# Patient Record
Sex: MALE | Race: WHITE | Employment: FULL TIME | URBAN - METROPOLITAN AREA
[De-identification: names, ages, dates, MRNs, and addresses within clinical notes are randomized per-mention and may not be internally consistent; named-entity substitution may affect disease eponyms.]

---

## 2017-09-11 RX ORDER — CEFAZOLIN SODIUM IN 0.9 % NACL 2 G/50 ML
2 INTRAVENOUS SOLUTION, PIGGYBACK (ML) INTRAVENOUS ONCE
Status: CANCELLED | OUTPATIENT
Start: 2017-09-11 | End: 2017-09-11

## 2017-09-11 NOTE — H&P
[address] [facility]  Pre Operative History and Physical Exam    Patient ID:  Soraida Hicks  [id number]  27 y.o.  1965    Today: September 11, 2017          CC:  Left  Knee pain    HPI:   The patient has end stage arthritis of the left knee. The patient was evaluated and examined during consultation prior to today. The patient complains of knee pain with activities, reports pain as mostly occurring along the joint lines, reports stiffness of the knee after inactivity, and swelling/pain at the end of the day and after increased physical activity. The pain affects the patients activities of daily living and quality of life. The patient has attempted and exhausted conservative treatment. There have been no changes to the patient's orthopedic condition since the last office visit. Past Medical History:  Past Medical History:   Diagnosis Date    DDD (degenerative disc disease), cervical 07/20/2016    several surgeries to correct    GERD (gastroesophageal reflux disease)     seldom     History of kidney stones     numerous, lithotripsy x 1    Hypertension     controlled with meds    Nausea & vomiting     sometimes after anesthesia    PUD (peptic ulcer disease)     no recent episodes    Sleep apnea     soon to receive a CPAP machine    Thromboembolus ([facility] [address] 2010    left knee s/p knee arth    Thromboembolus ([facility] [address] 2007    s/p lithotripsy-        Past Surgical History:  Past Surgical History:   Procedure Laterality Date    HX APPENDECTOMY  as teen     HX CERVICAL DISKECTOMY  2015    HX CERVICAL FUSION  1998    x 3 fusion    HX CHOLECYSTECTOMY      HX KNEE ARTHROSCOPY Left     left knee x 2    HX LAP CHOLECYSTECTOMY  2011    HX LUMBAR LAMINECTOMY  1995    lumbar laminectomy    HX TONSILLECTOMY  1993    SINUS SURGERY PROC UNLISTED          Medications:     Prior to Admission medications    Medication Sig Start Date End Date Taking?  Authorizing Provider   lansoprazole (PREVACID) 15 mg capsule Take  by mouth Daily (before breakfast). Historical Provider   diclofenac EC (VOLTAREN) 75 mg EC tablet Take  by mouth. Historical Provider   fluticasone (FLONASE) 50 mcg/actuation nasal spray 2 sprays to each nostril QD 8/29/17   Joe Arcos MD   lisinopril-hydroCHLOROthiazide (PRINZIDE, ZESTORETIC) 20-25 mg per tablet Take 1 Tab by mouth daily. 8/29/17   Joe Arcos MD   potassium chloride SR (KLOR-CON 10) 10 mEq tablet Take 1 Tab by mouth daily. 8/29/17   Joe Arcos MD   nebivolol (BYSTOLIC) 5 mg tablet Take 1 Tab by mouth daily. 8/29/17   Joe Arcos MD   OTHER Diltiazem 2%/Lidocaine 4% cream apply to hemorrhoids q 6 hours as needed 5/4/17   Neyda Dias MD   MOTION SICKNESS RELIEF,MECLIZ, 25 mg chewable tablet TAKE 1 TABLET BY MOUTH EVERY 4 HOURS AS NEEDED FOR VERTIGO OR NAUSEA 12/28/16   Historical Provider   cpap machine kit by Does Not Apply route. 12 cm    Historical Provider   aspirin delayed-release 81 mg tablet Take  by mouth every morning. Historical Provider   traMADol (ULTRAM) 50 mg tablet Take 50 mg by mouth every six (6) hours as needed for Pain. Historical Provider       Family History:     Family History   Problem Relation Age of Onset    Heart Disease Mother      CHF    Hypertension Mother     Cancer Father      lung    Hypertension Father     Hypertension Sister     Diabetes Brother     Stroke Brother     Hypertension Brother        Social History:      Social History   Substance Use Topics    Smoking status: Never Smoker    Smokeless tobacco: Never Used    Alcohol use 0.0 oz/week     1 - 2 Cans of beer per week      Comment: about 4 drinks every 2 weeks         Allergies: Allergies   Allergen Reactions    Codeine Itching        Vitals: There were no vitals taken for this visit.       Objective:         General: No Acute distress                   HEENT: Normocephalic/atramatic                   Lungs:  Breathing non-labored Heart:   RRR                    Abdomen: soft       Extremities:  Pain with ROM of the left knee. Trace effusion. Crepitus present. Distally the patient shows no neurologic deficit. Antalgic gait appreciated. Meds:   Current Outpatient Prescriptions   Medication Sig    lansoprazole (PREVACID) 15 mg capsule Take  by mouth Daily (before breakfast).  diclofenac EC (VOLTAREN) 75 mg EC tablet Take  by mouth.  fluticasone (FLONASE) 50 mcg/actuation nasal spray 2 sprays to each nostril QD    lisinopril-hydroCHLOROthiazide (PRINZIDE, ZESTORETIC) 20-25 mg per tablet Take 1 Tab by mouth daily.  potassium chloride SR (KLOR-CON 10) 10 mEq tablet Take 1 Tab by mouth daily.  nebivolol (BYSTOLIC) 5 mg tablet Take 1 Tab by mouth daily.  OTHER Diltiazem 2%/Lidocaine 4% cream apply to hemorrhoids q 6 hours as needed    MOTION SICKNESS RELIEF,MECLIZ, 25 mg chewable tablet TAKE 1 TABLET BY MOUTH EVERY 4 HOURS AS NEEDED FOR VERTIGO OR NAUSEA    cpap machine kit by Does Not Apply route. 12 cm    aspirin delayed-release 81 mg tablet Take  by mouth every morning.  traMADol (ULTRAM) 50 mg tablet Take 50 mg by mouth every six (6) hours as needed for Pain. No current facility-administered medications for this encounter. Patient Active Problem List   Diagnosis Code    HNP (herniated nucleus pulposus) with myelopathy, cervical M50.00    Kidney stone N20.0    Deep vein thrombosis (DVT) of popliteal vein (HCC) I82.439    DDD (degenerative disc disease), cervical M50.30    Left inguinal hernia K40.90    MARILYN (obstructive sleep apnea) G47.33    Hypersomnia G47.10    Neck pain M54.2    Bilateral inguinal hernia without obstruction or gangrene K40.20    S/P hernia repair Z98.890, Z87.19    Cervical radiculopathy M54.12    Osteoarthritis of cervical spine M47.812       Assessment:   1.  Arthritis of the Left knee    Plan:   The patient has failed previous conservative treatment for this condition including anti-inflammatories, injections and lifestyle modifications. The necessity for joint replacement is present. Risks, benefits, alternatives and possible complications of left knee arthroplasty have been discussed with the patient including but not limited to potential for infection, bleeding, damage to nerves and/or blood vessels, stiffness of the knee after surgery, knee instability after surgery, patellar maltracking, potential for fracture both intra-op and post-op, polyethylene wear, implant loosening, and risk for revision surgery secondary to but not limited to these discussed risks. Further, we have discussed potential for venous thrombo-embolism including deep vein thrombosis and pulmonary embolism despite being on prophylaxis. Additionally, we have discussed potential for continued pain after surgery and patient has voiced understanding that I can make no guarantees regarding the pain relief of outcomes after surgery. We have also previously discussed the potential of morbidity and mortality associated with, but not limited to, the actual surgical procedure, anesthesia, prior medical conditions, and/or the administration of medications perioperatively. The patient has been given the opportunity to ask questions all of which have been answered and the patient wishes to proceed. The patient will be admitted the day of surgery for left total knee replacement.         Signed By: Hipolito Ortiz MD  September 11, 2017

## 2017-09-18 ENCOUNTER — HOSPITAL ENCOUNTER (OUTPATIENT)
Dept: PHYSICAL THERAPY | Age: 52
Discharge: HOME | End: 2017-09-18
Payer: COMMERCIAL

## 2017-09-18 ENCOUNTER — HOSPITAL ENCOUNTER (OUTPATIENT)
Dept: SURGERY | Age: 52
Discharge: HOME | End: 2017-09-18
Payer: COMMERCIAL

## 2017-09-18 VITALS
WEIGHT: 251.4 LBS | HEART RATE: 52 BPM | TEMPERATURE: 97.1 F | SYSTOLIC BLOOD PRESSURE: 142 MMHG | HEIGHT: 75 IN | OXYGEN SATURATION: 96 % | BODY MASS INDEX: 31.26 KG/M2 | DIASTOLIC BLOOD PRESSURE: 80 MMHG

## 2017-09-18 LAB
ALBUMIN SERPL-MCNC: 3.8 G/DL (ref 3.5–5)
ANION GAP SERPL CALC-SCNC: 8 MMOL/L (ref 7–16)
APPEARANCE UR: CLEAR
APTT PPP: 24.9 SEC (ref 23.5–31.7)
ATRIAL RATE: 50 BPM
BACTERIA SPEC CULT: NORMAL
BASOPHILS # BLD: 0 K/UL (ref 0–0.2)
BASOPHILS NFR BLD: 1 % (ref 0–2)
BILIRUB UR QL: NEGATIVE
BUN SERPL-MCNC: 15 MG/DL (ref 6–23)
CALCIUM SERPL-MCNC: 9 MG/DL (ref 8.3–10.4)
CALCULATED P AXIS, ECG09: 3 DEGREES
CALCULATED R AXIS, ECG10: 24 DEGREES
CALCULATED T AXIS, ECG11: 5 DEGREES
CHLORIDE SERPL-SCNC: 104 MMOL/L (ref 98–107)
CO2 SERPL-SCNC: 28 MMOL/L (ref 21–32)
COLOR UR: YELLOW
CREAT SERPL-MCNC: 1.23 MG/DL (ref 0.8–1.5)
DIAGNOSIS, 93000: NORMAL
DIFFERENTIAL METHOD BLD: ABNORMAL
EOSINOPHIL # BLD: 0.3 K/UL (ref 0–0.8)
EOSINOPHIL NFR BLD: 5 % (ref 0.5–7.8)
ERYTHROCYTE [DISTWIDTH] IN BLOOD BY AUTOMATED COUNT: 14.8 % (ref 11.9–14.6)
EST. AVERAGE GLUCOSE BLD GHB EST-MCNC: 128 MG/DL
GLUCOSE SERPL-MCNC: 79 MG/DL (ref 65–100)
GLUCOSE UR STRIP.AUTO-MCNC: NEGATIVE MG/DL
HBA1C MFR BLD: 6.1 % (ref 4.8–6)
HCT VFR BLD AUTO: 47.7 % (ref 41.1–50.3)
HGB BLD-MCNC: 16 G/DL (ref 13.6–17.2)
HGB UR QL STRIP: NEGATIVE
IMM GRANULOCYTES # BLD: 0 K/UL (ref 0–0.5)
IMM GRANULOCYTES NFR BLD: 0.2 % (ref 0–5)
INR PPP: 0.9 (ref 0.9–1.2)
KETONES UR QL STRIP.AUTO: NEGATIVE MG/DL
LEUKOCYTE ESTERASE UR QL STRIP.AUTO: NEGATIVE
LYMPHOCYTES # BLD: 1.5 K/UL (ref 0.5–4.6)
LYMPHOCYTES NFR BLD: 22 % (ref 13–44)
MCH RBC QN AUTO: 30.6 PG (ref 26.1–32.9)
MCHC RBC AUTO-ENTMCNC: 33.5 G/DL (ref 31.4–35)
MCV RBC AUTO: 91.2 FL (ref 79.6–97.8)
MONOCYTES # BLD: 0.7 K/UL (ref 0.1–1.3)
MONOCYTES NFR BLD: 11 % (ref 4–12)
NEUTS SEG # BLD: 4.1 K/UL (ref 1.7–8.2)
NEUTS SEG NFR BLD: 61 % (ref 43–78)
NITRITE UR QL STRIP.AUTO: NEGATIVE
P-R INTERVAL, ECG05: 168 MS
PH UR STRIP: 5.5 [PH] (ref 5–9)
PLATELET # BLD AUTO: 207 K/UL (ref 150–450)
PMV BLD AUTO: 11.6 FL (ref 10.8–14.1)
POTASSIUM SERPL-SCNC: 3.5 MMOL/L (ref 3.5–5.1)
PROT UR STRIP-MCNC: NEGATIVE MG/DL
PROTHROMBIN TIME: 9.8 SEC (ref 9.6–12)
Q-T INTERVAL, ECG07: 432 MS
QRS DURATION, ECG06: 90 MS
QTC CALCULATION (BEZET), ECG08: 393 MS
RBC # BLD AUTO: 5.23 M/UL (ref 4.23–5.67)
SERVICE CMNT-IMP: NORMAL
SODIUM SERPL-SCNC: 140 MMOL/L (ref 136–145)
SP GR UR REFRACTOMETRY: 1.01 (ref 1–1.02)
UROBILINOGEN UR QL STRIP.AUTO: 0.2 EU/DL (ref 0.2–1)
VENTRICULAR RATE, ECG03: 50 BPM
WBC # BLD AUTO: 6.6 K/UL (ref 4.3–11.1)

## 2017-09-18 PROCEDURE — 97161 PT EVAL LOW COMPLEX 20 MIN: CPT

## 2017-09-18 RX ORDER — DICLOFENAC SODIUM 75 MG/1
75 TABLET, DELAYED RELEASE ORAL 2 TIMES DAILY
COMMUNITY
End: 2017-10-11

## 2017-09-18 RX ORDER — NEBIVOLOL 5 MG/1
5 TABLET ORAL DAILY
COMMUNITY
End: 2017-09-18

## 2017-09-18 RX ORDER — TRAMADOL HYDROCHLORIDE 50 MG/1
50 TABLET ORAL
COMMUNITY
End: 2017-10-11

## 2017-09-18 NOTE — PROGRESS NOTES
09/18/17 0900   Oxygen Therapy   O2 Sat (%) 97 %   Pulse via Oximetry 57 beats per minute   O2 Device Room air   Pre-Treatment   Breath Sounds Bilateral Clear;Diminished   Pre FEV1 (liters) 3.1 liters   % Predicted 68     Initial respiratory Assessment completed with pt. Pt was interviewed and evaluated in [facility] prior to surgery. Patient ID:  Kacey Charlton  [id number]  49 y.o.  1965  Surgeon: Marcos Thrasher  Date of Surgery: 10/9/2017  Procedure: Total Left Knee Arthroplasty  Primary Care Physician: Alethea Wall [phone]  Specialists:                                  Pt instructed in the use of Incentive Spirometry. Pt instructed to bring Incentive Spirometer back on date of surgery & to start using Is upon return to pt room. Pt taught proper cough technique      History of smoking:   NONE      Quit date:    Secondhand smoke:PARENTS      Past procedures with Oxygen desaturation: PT STATES HE COULD NOT BREATHE IN RR AFTER HERNIA SURGERY. - FELT LIKE HE COULD NOT BREATHE AFTER LAST SURGERY    Past Medical History:   Diagnosis Date    Adverse effect of anesthesia     after hernia surgery in 2016 here at [facility] \"I had trouble breathing and I blacked out\"    BMI 31.0-31.9,adult     Chronic pain     DDD (degenerative disc disease), cervical 07/20/2016    several surgeries to correct    GERD (gastroesophageal reflux disease)     seldom     History of kidney stones     numerous, lithotripsy x 1    Hypertension     controlled with meds    Nausea & vomiting     sometimes after anesthesia    OA (osteoarthritis)     PUD (peptic ulcer disease)     no recent episodes    Sleep apnea     c-pap    Thromboembolus ([address] 2010    left knee s/p knee arth    Thromboembolus ([address] 2007    s/p lithotripsy-     Vertigo                                     ENT:SINUS, CHOKES EASILY             CURRENTLY HAS MILD CONGESTION Respiratory history:MARILYN- CPAP                                  DENIES SOB                                  Respiratory meds:                                         FAMILY PRESENT:            SPOUSE,                                                PAST SLEEP STUDY:        YES         HX OF MARILYN:                        YES                                            MAIRLYN assessment:                                               SLEEPS ON SIDE &  BACK                                                   PHYSICAL EXAM   Body mass index is 31.42 kg/(m^2). Visit Vitals    /80 (BP 1 Location: Right arm, BP Patient Position: At rest;Sitting)    Pulse (!) 52    Temp 97.1 °F (36.2 °C)    Ht 6' 3\" (1.905 m)    Wt 114 kg (251 lb 6.4 oz)    SpO2 96%    BMI 31.42 kg/m2     Neck circumference:  45.5    cm    Loud snoring:YES      Witnessed apnea or wakening gasping or choking:,  APNEA    Awakens with headaches:DENIES    Morning or daytime tiredness/ sleepiness:  TIRED     Dry mouth or sore throat in morning:YES     Valerio stage:4    SACS score:58    STOP/BAN                              CPAP:HOME CPAP        ALBUTEROL NEB Q6 PRN                CONT SAT HS         Pt.  Phone Number:

## 2017-09-18 NOTE — ADVANCED PRACTICE NURSE
Total Joint Surgery Preoperative Chart Review      Patient ID:  Ham Nickerson  [id number]  27 y.o.  1965  Surgeon: Dr Holly Grover  Date of Surgery: 10/9/2017  Procedure: Total Left Knee Arthroplasty  Primary Care Physician: Spencer Maya [phone]  Specialty Physician(s):      Subjective:   Ham Nickerson is a 46 y.o. WHITE OR  male who presents for preoperative evaluation for Total Left Knee arthroplasty. This is a preoperative chart review note based on data collected by the nurse at the surgical Pre-Assessment visit.     Past Medical History:   Diagnosis Date    Adverse effect of anesthesia     after hernia surgery in 2016 here at [address] \"I had trouble breathing and I blacked out\"    BMI 31.0-31.9,adult     Chronic pain     DDD (degenerative disc disease), cervical 07/20/2016    several surgeries to correct    GERD (gastroesophageal reflux disease)     seldom     History of kidney stones     numerous, lithotripsy x 1    Hypertension     controlled with meds    Nausea & vomiting     sometimes after anesthesia    OA (osteoarthritis)     PUD (peptic ulcer disease)     no recent episodes    Sleep apnea     c-pap    Thromboembolus ([address] 2010    left knee s/p knee arth    Thromboembolus ([address] 2007    s/p lithotripsy-     Vertigo       Past Surgical History:   Procedure Laterality Date    HX APPENDECTOMY  as teen     HX CERVICAL DISKECTOMY  2015    HX CERVICAL FUSION  1998    x 3 fusion    HX HERNIA REPAIR Bilateral 2016    HX HERNIA REPAIR      umbilical     HX KNEE ARTHROSCOPY Left     left knee x 2    HX LAP CHOLECYSTECTOMY  2011    HX LITHOTRIPSY      HX LUMBAR LAMINECTOMY  1995    lumbar laminectomy    HX LYSIS OF ADHESIONS  2012    HX SEPTOPLASTY  2014    HX TONSILLECTOMY  1993     Family History   Problem Relation Age of Onset    Heart Disease Mother      CHF    Hypertension Mother     Cancer Father      lung    Hypertension Father     Hypertension Sister  Diabetes Brother     Stroke Brother     Hypertension Brother       Social History   Substance Use Topics    Smoking status: Never Smoker    Smokeless tobacco: Never Used    Alcohol use 0.0 oz/week     1 - 2 Cans of beer per week      Comment: about 4 drinks every 2 weeks       Prior to Admission medications    Medication Sig Start Date End Date Taking? Authorizing Provider   diclofenac EC (VOLTAREN) 75 mg EC tablet Take 75 mg by mouth two (2) times a day. Indications: OSTEOARTHRITIS   Yes Historical Provider   traMADol (ULTRAM) 50 mg tablet Take 50 mg by mouth every six (6) hours as needed for Pain. Take / use AM day of surgery  per anesthesia protocols if needed   Indications: Pain   Yes Historical Provider   metoprolol succinate (TOPROL-XL) 50 mg XL tablet Take 1 Tab by mouth daily. 9/12/17  Yes Mich Costa MD   fluticasone [facility]) 50 mcg/actuation nasal spray 2 sprays to each nostril QD 8/29/17  Yes Mich Costa MD   lisinopril-hydroCHLOROthiazide (PRINZIDE, ZESTORETIC) 20-25 mg per tablet Take 1 Tab by mouth daily. 8/29/17  Yes Mich Costa MD   potassium chloride SR (KLOR-CON 10) 10 mEq tablet Take 1 Tab by mouth daily. 8/29/17  Yes Mich Costa MD   MOTION SICKNESS RELIEF,MECLIZ, 25 mg chewable tablet TAKE 1 TABLET BY MOUTH EVERY 4 HOURS AS NEEDED FOR VERTIGO OR NAUSEA 12/28/16  Yes Historical Provider   cpap machine kit by Does Not Apply route. 12 cm   Yes Historical Provider   aspirin delayed-release 81 mg tablet Take 81 mg by mouth daily. Take / use AM day of surgery  per anesthesia protocols.    Yes Historical Provider     Allergies   Allergen Reactions    Codeine Itching          Objective:     Physical Exam:   Patient Vitals for the past 24 hrs:   Temp Pulse BP SpO2   09/18/17 1031 97.1 °F (36.2 °C) (!) 52 142/80 96 %       ECG:    EKG Results     Procedure 720 Value Units Date/Time    EKG, 12 LEAD, INITIAL [[id number]] Collected:  09/18/17 1118    Order Status:  Completed Updated:  09/18/17 1200     Ventricular Rate 50 BPM      Atrial Rate 50 BPM      P-R Interval 168 ms      QRS Duration 90 ms      Q-T Interval 432 ms      QTC Calculation (Bezet) 393 ms      Calculated P Axis 3 degrees      Calculated R Axis 24 degrees      Calculated T Axis 5 degrees      Diagnosis --     Sinus bradycardia  Otherwise normal ECG  No previous ECGs available            Data Review:   Labs:   Recent Results (from the past 24 hour(s))   CBC WITH AUTOMATED DIFF    Collection Time: 09/18/17  9:10 AM   Result Value Ref Range    WBC 6.6 4.3 - 11.1 K/uL    RBC 5.23 4.23 - 5.67 M/uL    HGB 16.0 13.6 - 17.2 g/dL    HCT 47.7 41.1 - 50.3 %    MCV 91.2 79.6 - 97.8 FL    MCH 30.6 26.1 - 32.9 PG    MCHC 33.5 31.4 - 35.0 g/dL    RDW 14.8 (H) 11.9 - 14.6 %    PLATELET 236 938 - 147 K/uL    MPV 11.6 10.8 - 14.1 FL    DF AUTOMATED      NEUTROPHILS 61 43 - 78 %    LYMPHOCYTES 22 13 - 44 %    MONOCYTES 11 4.0 - 12.0 %    EOSINOPHILS 5 0.5 - 7.8 %    BASOPHILS 1 0.0 - 2.0 %    IMMATURE GRANULOCYTES 0.2 0.0 - 5.0 %    ABS. NEUTROPHILS 4.1 1.7 - 8.2 K/UL    ABS. LYMPHOCYTES 1.5 0.5 - 4.6 K/UL    ABS. MONOCYTES 0.7 0.1 - 1.3 K/UL    ABS. EOSINOPHILS 0.3 0.0 - 0.8 K/UL    ABS. BASOPHILS 0.0 0.0 - 0.2 K/UL    ABS. IMM.  GRANS. 0.0 0.0 - 0.5 K/UL   PROTHROMBIN TIME + INR    Collection Time: 09/18/17  9:10 AM   Result Value Ref Range    Prothrombin time 9.8 9.6 - 12.0 sec    INR 0.9 0.9 - 1.2     PTT    Collection Time: 09/18/17  9:10 AM   Result Value Ref Range    aPTT 24.9 23.5 - 45.8 SEC   METABOLIC PANEL, BASIC    Collection Time: 09/18/17  9:10 AM   Result Value Ref Range    Sodium 140 136 - 145 mmol/L    Potassium 3.5 3.5 - 5.1 mmol/L    Chloride 104 98 - 107 mmol/L    CO2 28 21 - 32 mmol/L    Anion gap 8 7 - 16 mmol/L    Glucose 79 65 - 100 mg/dL    BUN 15 6 - 23 MG/DL    Creatinine 1.23 0.8 - 1.5 MG/DL    GFR est AA >60 >60 ml/min/1.73m2    GFR est non-AA >60 >60 ml/min/1.73m2    Calcium 9.0 8.3 - 10.4 MG/DL   URINALYSIS W/ RFLX MICROSCOPIC    Collection Time: 09/18/17  9:10 AM   Result Value Ref Range    Color YELLOW      Appearance CLEAR      Specific gravity 1.006 1.001 - 1.023      pH (UA) 5.5 5.0 - 9.0      Protein NEGATIVE  NEG mg/dL    Glucose NEGATIVE  mg/dL    Ketone NEGATIVE  NEG mg/dL    Bilirubin NEGATIVE  NEG      Blood NEGATIVE  NEG      Urobilinogen 0.2 0.2 - 1.0 EU/dL    Nitrites NEGATIVE  NEG      Leukocyte Esterase NEGATIVE  NEG     HEMOGLOBIN A1C WITH EAG    Collection Time: 09/18/17  9:10 AM   Result Value Ref Range    Hemoglobin A1c 6.1 (H) 4.8 - 6.0 %    Est. average glucose 128 mg/dL   ALBUMIN    Collection Time: 09/18/17  9:10 AM   Result Value Ref Range    Albumin 3.8 3.5 - 5.0 g/dL   EKG, 12 LEAD, INITIAL    Collection Time: 09/18/17 11:18 AM   Result Value Ref Range    Ventricular Rate 50 BPM    Atrial Rate 50 BPM    P-R Interval 168 ms    QRS Duration 90 ms    Q-T Interval 432 ms    QTC Calculation (Bezet) 393 ms    Calculated P Axis 3 degrees    Calculated R Axis 24 degrees    Calculated T Axis 5 degrees    Diagnosis       Sinus bradycardia  Otherwise normal ECG  No previous ECGs available           Problem List:  )  Patient Active Problem List   Diagnosis Code    HNP (herniated nucleus pulposus) with myelopathy, cervical M50.00    Kidney stone N20.0    Deep vein thrombosis (DVT) of popliteal vein (HCC) I82.439    DDD (degenerative disc disease), cervical M50.30    Left inguinal hernia K40.90    MARILYN (obstructive sleep apnea) G47.33    Hypersomnia G47.10    Neck pain M54.2    Bilateral inguinal hernia without obstruction or gangrene K40.20    S/P hernia repair Z98.890, Z87.19    Cervical radiculopathy M54.12    Osteoarthritis of cervical spine M47.812       Total Joint Surgery Pre-Assessment Recommendations:           Patient is to wear home CPAP during hospitalization.        Signed By: KALYAN Briceno    September 18, 2017

## 2017-09-18 NOTE — PROGRESS NOTES
Rosendo Rendon  : (41 y.o.) [address] [facility] [address]  [phone]       Physical Therapy Prehab Plan of Treatment and Evaluation Summary:2017    ICD-10: Treatment Diagnosis:   · Pain in Left Knee (M25.562)  · Stiffness of Left Knee, Not elsewhere classified (M25.662)  · Difficulty in walking, Not elsewhere classified (R26.2)  Precautions/Allergies:   Codeine  MEDICAL/REFERRING DIAGNOSIS:  Unilateral primary osteoarthritis, left knee [M17.12]  REFERRING PHYSICIAN: Taz Crespo MD  DATE OF SURGERY: 10/9/17   Assessment:   Comments:  Pt. Plans to go home with spouse. PROBLEM LIST (Impacting functional limitations):  Mr. Valentina Ortiz presents with the following left lower extremity(s) problems:  1. Strength  2. Range of Motion  3. Home Exercise Program  4. Pain   INTERVENTIONS PLANNED:  1. Home Exercise Program  2. Educational Discussion     TREATMENT PLAN: Effective Dates: 2017 TO 2017. Frequency/Duration: Patient to continue to perform home exercise program at least twice per day up until his surgery. GOALS: (Goals have been discussed and agreed upon with patient.)  Discharge Goals: Time Frame: 1 Day  1. Patient will demonstrate independence with a home exercise program designed to increase strength, range of motion and pain control to minimize functional deficits and optimize patient for total joint replacement. Rehabilitation Potential For Stated Goals: Good  Regarding Escobar Child's therapy, I certify that the treatment plan above will be carried out by a therapist or under their direction.   Thank you for this referral,  Yobany Guadalupe, PT               HISTORY:   Present Symptoms:  Pain Intensity 1:  (10 at worst)  Pain Location 1: Knee   History of Present Injury/Illness (Reason for Referral):  Medical/Referring Diagnosis: Unilateral primary osteoarthritis, left knee [M17.12]   Past Medical History/Comorbidities: Mr. Valentina Ortiz  has a past medical history of Adverse effect of anesthesia; BMI 31.0-31.9,adult; Chronic pain; DDD (degenerative disc disease), cervical (07/20/2016); GERD (gastroesophageal reflux disease); History of kidney stones; Hypertension; Nausea & vomiting; OA (osteoarthritis); PUD (peptic ulcer disease); Sleep apnea; Thromboembol[address] ([facility] [address] (2010); Thromboembolus ([address] (2007); and Vertigo. He also has no past medical history of Aneurysm ([address] Arrhythmia; Asthma; Autoimmune disease ([address] CAD (coronary artery disease); Cancer ([facility] [address] Chronic kidney disease; Chronic obstructive pulmonary disease ([address] Coagulation disorder ([address] Diabetes ([address] Difficult intubation; Endocarditis; Heart failure ([facility] [address] Ill-defined condition; Liver disease; Malignant hyperthermia due to anesthesia; Nicotine vapor product user; Non-nicotine vapor product user; Other ill-defined conditions; Pseudocholinesterase deficiency; Psychiatric disorder; Rheumatic fever; Seizures ([facility] [address] Stroke [facility]); Thyroid disease; or Unspecified adverse effect of anesthesia. Mr. Valentina Ortiz  has a past surgical history that includes appendectomy (as teen ); lap cholecystectomy (2011); knee arthroscopy (Left); cervical fusion (1998); lumbar laminectomy (1995); cervical diskectomy (2015); tonsillectomy (1993); hernia repair (Bilateral, 2016); septoplasty (2014); hernia repair; lithotripsy; and lysis of adhesions (2012).   Social History/Living Environment:   Home Environment: Private residence  # Steps to Enter: 1  Rails to Enter: No  One/Two Story Residence: Two story  # of Interior Steps: 11  Interior Rails: Left  Lift Chair Available: No  Living Alone: No  Support Systems: Spouse/Significant Other/Partner  Patient Expects to be Discharged to[de-identified] Private residence  Current DME Used/Available at Home: None  Tub or Shower Type: Tub/Shower combination  Work/Activity:  Employed, sitting and light activity  Dominant Side:  RIGHT  Current Medications:  See Pre-assessment nursing note   Number of Personal Factors/Comorbidities that affect the Plan of Care: 1-2: MODERATE COMPLEXITY   EXAMINATION:   ADLs (Current Functional Status):   Ambulation:  [x] Independent  [] Walk Indoors Only  [] Walk Outdoors  [] Use Assistive Device  [] Use Wheelchair Only Dressing:  [x] Independent  Requires Assistance from Someone for:  [x] Sock/Shoes  [] Pants  [] Everything   Bathing/Showering:   [x] Independent  [] Requires Assistance from Someone  [] Sponge Bath Only Household Activities:  [x] Routine house and yard work  [] Light Housework Only  [] None   Observation/Orthostatic Postural Assessment:   Exceptions to WDLGenu valgus left  ROM/Flexibility:   Gross Assessment: Yes  AROM: Within functional limits (right LE)                LLE Assessment  LLE Assessment (WDL): Exception to WDL  LLE AROM  L Knee Flexion: 110  L Knee Extension: 7          Strength:   Gross Assessment: Yes  Strength: Within functional limits (right LE)       LLE Strength  L Knee Flexion: 4  L Knee Extension: 4          Functional Mobility:    Gross Assessment: Yes    Gait Description (WDL): Exceptions to WDL  Stand to Sit: Independent  Sit to Stand: Independent  Distance (ft): 250 Feet (ft)  Ambulation - Level of Assistance: Independent  Stance: Left decreased  Gait Abnormalities: Antalgic          Balance:    Sitting: Intact  Standing: Intact   Body Structures Involved:  1. Bones  2. Joints  3. Muscles  4. Ligaments Body Functions Affected:  1. Movement Related Activities and Participation Affected:  1. Mobility   Number of elements that affect the Plan of Care: 3: MODERATE COMPLEXITY   CLINICAL PRESENTATION:   Presentation: Stable and uncomplicated: LOW COMPLEXITY   CLINICAL DECISION MAKING:   Outcome Measure:    Tool Used: Lower Extremity Functional Scale (LEFS)  Score:  Initial: 11/80 Most Recent: X/80 (Date: -- )   Interpretation of Score: 20 questions each scored on a 5 point scale with 0 representing \"extreme difficulty or unable to perform\" and 4 representing \"no difficulty\". The lower the score, the greater the functional disability. 80/80 represents no disability. Minimal detectable change is 9 points. Score 80 79-65 64-49 48-33 32-17 16-1 0   Modifier CH CI CJ CK CL CM CN     ? Mobility - Walking and Moving Around:     - CURRENT STATUS: CM - 80%-99% impaired, limited or restricted    - GOAL STATUS: CM - 80%-99% impaired, limited or restricted    - D/C STATUS:  CM - 80%-99% impaired, limited or restricted    Medical Necessity:   · Mr. Guy Stokes is expected to optimize his lower extremity strength and ROM in preparation for joint replacement surgery. Reason for Services/Other Comments:  · Achieve baseline assesment of musculoskeletal system, functional mobility and home environment. , educate in PT HEP in preparation for surgery, educate in hospital plan of care. Use of outcome tool(s) and clinical judgement create a POC that gives a: Clear prediction of patient's progress: LOW COMPLEXITY   TREATMENT:   Treatment/Session Assessment:  Patient was instructed in PT- HEP to increase strength and ROM in LEs. Answered all questions. · Post session pain:  No complaints  · Compliance with Program/Exercises: compliant most of the time.   Total Treatment Duration:PT Patient Time In/Time Out  Time In: 0900  Time Out: [address] [facility],

## 2017-09-18 NOTE — PERIOP NOTES
Joe Arcos MD    Your patient recently had labs drawn during a hospital appointment due to an upcoming surgery. The results are attached. If you have any questions or concerns please reach out to your patient for a follow-up in your office. Please do not respond to this message as my mailbox is not monitored. You may call [phone] with questions or concerns. Recent Results (from the past 12 hour(s))   CBC WITH AUTOMATED DIFF    Collection Time: 09/18/17  9:10 AM   Result Value Ref Range    WBC 6.6 4.3 - 11.1 K/uL    RBC 5.23 4.23 - 5.67 M/uL    HGB 16.0 13.6 - 17.2 g/dL    HCT 47.7 41.1 - 50.3 %    MCV 91.2 79.6 - 97.8 FL    MCH 30.6 26.1 - 32.9 PG    MCHC 33.5 31.4 - 35.0 g/dL    RDW 14.8 (H) 11.9 - 14.6 %    PLATELET 626 515 - 725 K/uL    MPV 11.6 10.8 - 14.1 FL    DF AUTOMATED      NEUTROPHILS 61 43 - 78 %    LYMPHOCYTES 22 13 - 44 %    MONOCYTES 11 4.0 - 12.0 %    EOSINOPHILS 5 0.5 - 7.8 %    BASOPHILS 1 0.0 - 2.0 %    IMMATURE GRANULOCYTES 0.2 0.0 - 5.0 %    ABS. NEUTROPHILS 4.1 1.7 - 8.2 K/UL    ABS. LYMPHOCYTES 1.5 0.5 - 4.6 K/UL    ABS. MONOCYTES 0.7 0.1 - 1.3 K/UL    ABS. EOSINOPHILS 0.3 0.0 - 0.8 K/UL    ABS. BASOPHILS 0.0 0.0 - 0.2 K/UL    ABS. IMM.  GRANS. 0.0 0.0 - 0.5 K/UL   PROTHROMBIN TIME + INR    Collection Time: 09/18/17  9:10 AM   Result Value Ref Range    Prothrombin time 9.8 9.6 - 12.0 sec    INR 0.9 0.9 - 1.2     PTT    Collection Time: 09/18/17  9:10 AM   Result Value Ref Range    aPTT 24.9 23.5 - 25.6 SEC   METABOLIC PANEL, BASIC    Collection Time: 09/18/17  9:10 AM   Result Value Ref Range    Sodium 140 136 - 145 mmol/L    Potassium 3.5 3.5 - 5.1 mmol/L    Chloride 104 98 - 107 mmol/L    CO2 28 21 - 32 mmol/L    Anion gap 8 7 - 16 mmol/L    Glucose 79 65 - 100 mg/dL    BUN 15 6 - 23 MG/DL    Creatinine 1.23 0.8 - 1.5 MG/DL    GFR est AA >60 >60 ml/min/1.73m2    GFR est non-AA >60 >60 ml/min/1.73m2    Calcium 9.0 8.3 - 10.4 MG/DL   URINALYSIS W/ RFLX MICROSCOPIC    Collection Time: 09/18/17  9:10 AM   Result Value Ref Range    Color YELLOW      Appearance CLEAR      Specific gravity 1.006 1.001 - 1.023      pH (UA) 5.5 5.0 - 9.0      Protein NEGATIVE  NEG mg/dL    Glucose NEGATIVE  mg/dL    Ketone NEGATIVE  NEG mg/dL    Bilirubin NEGATIVE  NEG      Blood NEGATIVE  NEG      Urobilinogen 0.2 0.2 - 1.0 EU/dL    Nitrites NEGATIVE  NEG      Leukocyte Esterase NEGATIVE  NEG     HEMOGLOBIN A1C WITH EAG    Collection Time: 09/18/17  9:10 AM   Result Value Ref Range    Hemoglobin A1c 6.1 (H) 4.8 - 6.0 %    Est. average glucose 128 mg/dL   ALBUMIN    Collection Time: 09/18/17  9:10 AM   Result Value Ref Range    Albumin 3.8 3.5 - 5.0 g/dL

## 2017-09-18 NOTE — PERIOP NOTES
Patient verified name, , and surgery as listed in [facility]. Type 3 surgery, walk in assessment complete. Labs per surgeon: CBC, BMP, U/A, PT/PTT, Albumin, HGB-A1C ; results within [facility] protocols. MRSA nasal swab and Urine nicotine pending. Labs per anesthesia protocol: included in surgeons orders. EKG: done today; within [facility] protocols. [facility] sleep [facility] office note dated 17 on chart for reference if needed. Hibiclens and instructions to return bottle on DOS given per hospital policy. Nasal Swab collected per MD order and instructions for Mupirocin nasal ointment if required. Patient provided with handouts including Guide to Surgery, Pain Management, Hand Hygiene, Blood Transfusion Education, and [facility] Brochure. Patient answered medical/surgical history questions at their best of ability. All prior to admission medications documented in [facility]. Original medication prescription bottles not visualized during patient appointment. Patient instructed to hold all vitamins 7 days prior to surgery and NSAIDS 5 days prior to surgery. Medications to be held: diclofenac. Patient instructed to continue previous medications as prescribed prior to surgery and to take the following medications the day of surgery according to anesthesia guidelines with a small sip of water: 81 mg aspirin, metoprolol, meclizine if needed, tramadol if needed. Instructed to bring c-pap dos. Patient teach back successful and patient demonstrates knowledge of instruction.

## 2017-09-19 LAB
COTININE UR QL SCN: NEGATIVE NG/ML
DRUG SCREEN COMMENT:, 753798: NORMAL

## 2017-09-20 NOTE — PERIOP NOTES
9/19/2017  8:40 AM - Ray, [facility] [employer]   Component Results   Component Value Flag Ref Range Units Status   Cotinine Screen, urine NEGATIVE   Cbehih=654 ng/mL Final

## 2017-10-02 NOTE — PERIOP NOTES
Call received from Kat Keller with Dr. Jonhson Drummond office regarding pt's recent diagnosis of pneumonia~Dr Edgar by to review most recent CXR dated 10/2/17 in [facility] and Dr. Leena Sheppard office note from 9/27/17. Most recent CXR states no pneumonia; Dr. Jaime Able cleared pt for surgery. Message left with Kat Keller regarding surgery clearance.

## 2017-10-08 ENCOUNTER — ANESTHESIA EVENT (OUTPATIENT)
Dept: SURGERY | Age: 52
DRG: 470 | End: 2017-10-08
Payer: COMMERCIAL

## 2017-10-09 ENCOUNTER — ANESTHESIA (OUTPATIENT)
Dept: SURGERY | Age: 52
DRG: 470 | End: 2017-10-09
Payer: COMMERCIAL

## 2017-10-09 ENCOUNTER — HOME HEALTH ADMISSION (OUTPATIENT)
Dept: HOME HEALTH SERVICES | Facility: HOME HEALTH | Age: 52
End: 2017-10-09
Payer: COMMERCIAL

## 2017-10-09 ENCOUNTER — HOSPITAL ENCOUNTER (INPATIENT)
Age: 52
LOS: 2 days | Discharge: HOME HEALTH | DRG: 470 | End: 2017-10-11
Attending: ORTHOPAEDIC SURGERY | Admitting: ORTHOPAEDIC SURGERY
Payer: COMMERCIAL

## 2017-10-09 PROBLEM — M17.9 OA (OSTEOARTHRITIS) OF KNEE: Status: ACTIVE | Noted: 2017-10-09

## 2017-10-09 LAB
ABO + RH BLD: NORMAL
BLOOD GROUP ANTIBODIES SERPL: NORMAL
GLUCOSE BLD STRIP.AUTO-MCNC: 101 MG/DL (ref 65–100)
HGB BLD-MCNC: 14 G/DL (ref 13.6–17.2)
SPECIMEN EXP DATE BLD: NORMAL

## 2017-10-09 PROCEDURE — 74011000250 HC RX REV CODE- 250

## 2017-10-09 PROCEDURE — 74011250637 HC RX REV CODE- 250/637: Performed by: ORTHOPAEDIC SURGERY

## 2017-10-09 PROCEDURE — 86900 BLOOD TYPING SEROLOGIC ABO: CPT | Performed by: ORTHOPAEDIC SURGERY

## 2017-10-09 PROCEDURE — 77030012935 HC DRSG AQUACEL BMS -B: Performed by: ORTHOPAEDIC SURGERY

## 2017-10-09 PROCEDURE — 36415 COLL VENOUS BLD VENIPUNCTURE: CPT | Performed by: ORTHOPAEDIC SURGERY

## 2017-10-09 PROCEDURE — 74011250637 HC RX REV CODE- 250/637: Performed by: ANESTHESIOLOGY

## 2017-10-09 PROCEDURE — 74011000302 HC RX REV CODE- 302: Performed by: ORTHOPAEDIC SURGERY

## 2017-10-09 PROCEDURE — 74011250636 HC RX REV CODE- 250/636: Performed by: ANESTHESIOLOGY

## 2017-10-09 PROCEDURE — 77030033067 HC SUT PDO STRATFX SPIR J&J -B: Performed by: ORTHOPAEDIC SURGERY

## 2017-10-09 PROCEDURE — 65270000029 HC RM PRIVATE

## 2017-10-09 PROCEDURE — 77030003665 HC NDL SPN BBMI -A: Performed by: ANESTHESIOLOGY

## 2017-10-09 PROCEDURE — 82962 GLUCOSE BLOOD TEST: CPT

## 2017-10-09 PROCEDURE — 77030034849: Performed by: ORTHOPAEDIC SURGERY

## 2017-10-09 PROCEDURE — 97165 OT EVAL LOW COMPLEX 30 MIN: CPT

## 2017-10-09 PROCEDURE — 77030018846 HC SOL IRR STRL H20 ICUM -A

## 2017-10-09 PROCEDURE — 94760 N-INVAS EAR/PLS OXIMETRY 1: CPT

## 2017-10-09 PROCEDURE — 77030012890

## 2017-10-09 PROCEDURE — 77030011640 HC PAD GRND REM COVD -A: Performed by: ORTHOPAEDIC SURGERY

## 2017-10-09 PROCEDURE — 74011250636 HC RX REV CODE- 250/636: Performed by: ORTHOPAEDIC SURGERY

## 2017-10-09 PROCEDURE — 85018 HEMOGLOBIN: CPT | Performed by: ORTHOPAEDIC SURGERY

## 2017-10-09 PROCEDURE — 77030018836 HC SOL IRR NACL ICUM -A: Performed by: ORTHOPAEDIC SURGERY

## 2017-10-09 PROCEDURE — 76060000034 HC ANESTHESIA 1.5 TO 2 HR: Performed by: ORTHOPAEDIC SURGERY

## 2017-10-09 PROCEDURE — 77030003602 HC NDL NRV BLK BBMI -B: Performed by: ANESTHESIOLOGY

## 2017-10-09 PROCEDURE — 76010000162 HC OR TIME 1.5 TO 2 HR INTENSV-TIER 1: Performed by: ORTHOPAEDIC SURGERY

## 2017-10-09 PROCEDURE — 76210000006 HC OR PH I REC 0.5 TO 1 HR: Performed by: ORTHOPAEDIC SURGERY

## 2017-10-09 PROCEDURE — 77030031139 HC SUT VCRL2 J&J -A: Performed by: ORTHOPAEDIC SURGERY

## 2017-10-09 PROCEDURE — 74011250636 HC RX REV CODE- 250/636

## 2017-10-09 PROCEDURE — 77030003666 HC NDL SPINAL BD -A: Performed by: ORTHOPAEDIC SURGERY

## 2017-10-09 PROCEDURE — 76942 ECHO GUIDE FOR BIOPSY: CPT | Performed by: ORTHOPAEDIC SURGERY

## 2017-10-09 PROCEDURE — 77030007880 HC KT SPN EPDRL BBMI -B: Performed by: ANESTHESIOLOGY

## 2017-10-09 PROCEDURE — 76010010054 HC POST OP PAIN BLOCK: Performed by: ORTHOPAEDIC SURGERY

## 2017-10-09 PROCEDURE — 77030032490 HC SLV COMPR SCD KNE COVD -B

## 2017-10-09 PROCEDURE — 74011000250 HC RX REV CODE- 250: Performed by: ANESTHESIOLOGY

## 2017-10-09 PROCEDURE — 0SRD0JZ REPLACEMENT OF LEFT KNEE JOINT WITH SYNTHETIC SUBSTITUTE, OPEN APPROACH: ICD-10-PCS | Performed by: ORTHOPAEDIC SURGERY

## 2017-10-09 PROCEDURE — 86580 TB INTRADERMAL TEST: CPT | Performed by: ORTHOPAEDIC SURGERY

## 2017-10-09 PROCEDURE — 77010033678 HC OXYGEN DAILY

## 2017-10-09 PROCEDURE — 77030020782 HC GWN BAIR PAWS FLX 3M -B: Performed by: ANESTHESIOLOGY

## 2017-10-09 PROCEDURE — 77030002933 HC SUT MCRYL J&J -A: Performed by: ORTHOPAEDIC SURGERY

## 2017-10-09 PROCEDURE — 77030002922 HC SUT FBRWRE ARTH -B: Performed by: ORTHOPAEDIC SURGERY

## 2017-10-09 PROCEDURE — C1776 JOINT DEVICE (IMPLANTABLE): HCPCS | Performed by: ORTHOPAEDIC SURGERY

## 2017-10-09 PROCEDURE — 74011000250 HC RX REV CODE- 250: Performed by: ORTHOPAEDIC SURGERY

## 2017-10-09 PROCEDURE — 77030013727 HC IRR FAN PULSVC ZIMM -B: Performed by: ORTHOPAEDIC SURGERY

## 2017-10-09 PROCEDURE — 97161 PT EVAL LOW COMPLEX 20 MIN: CPT

## 2017-10-09 DEVICE — COMPNT FEM CR TRIATHLN 7 L PA -- MOR-KNEE: Type: IMPLANTABLE DEVICE | Site: KNEE | Status: FUNCTIONAL

## 2017-10-09 DEVICE — COMPONENT PAT SZ A40 W44MM DIA40MM THK11MM MED LAT KNEE: Type: IMPLANTABLE DEVICE | Site: KNEE | Status: FUNCTIONAL

## 2017-10-09 DEVICE — BASEPLATE TIB SZ 8 AP60MM ML85MM KEEL W58MM D28MM PEG L12MM: Type: IMPLANTABLE DEVICE | Site: KNEE | Status: FUNCTIONAL

## 2017-10-09 RX ORDER — SODIUM CHLORIDE 0.9 % (FLUSH) 0.9 %
5-10 SYRINGE (ML) INJECTION EVERY 8 HOURS
Status: DISCONTINUED | OUTPATIENT
Start: 2017-10-09 | End: 2017-10-09

## 2017-10-09 RX ORDER — FENTANYL CITRATE 50 UG/ML
100 INJECTION, SOLUTION INTRAMUSCULAR; INTRAVENOUS ONCE
Status: COMPLETED | OUTPATIENT
Start: 2017-10-09 | End: 2017-10-09

## 2017-10-09 RX ORDER — NALOXONE HYDROCHLORIDE 0.4 MG/ML
.2-.4 INJECTION, SOLUTION INTRAMUSCULAR; INTRAVENOUS; SUBCUTANEOUS
Status: DISCONTINUED | OUTPATIENT
Start: 2017-10-09 | End: 2017-10-11 | Stop reason: HOSPADM

## 2017-10-09 RX ORDER — SODIUM CHLORIDE 0.9 % (FLUSH) 0.9 %
5-10 SYRINGE (ML) INJECTION AS NEEDED
Status: DISCONTINUED | OUTPATIENT
Start: 2017-10-09 | End: 2017-10-11 | Stop reason: HOSPADM

## 2017-10-09 RX ORDER — CEFAZOLIN SODIUM IN 0.9 % NACL 2 G/50 ML
2 INTRAVENOUS SOLUTION, PIGGYBACK (ML) INTRAVENOUS EVERY 8 HOURS
Status: COMPLETED | OUTPATIENT
Start: 2017-10-09 | End: 2017-10-10

## 2017-10-09 RX ORDER — SODIUM CHLORIDE 0.9 % (FLUSH) 0.9 %
5-10 SYRINGE (ML) INJECTION AS NEEDED
Status: DISCONTINUED | OUTPATIENT
Start: 2017-10-09 | End: 2017-10-09 | Stop reason: HOSPADM

## 2017-10-09 RX ORDER — HYDROMORPHONE HYDROCHLORIDE 1 MG/ML
1 INJECTION, SOLUTION INTRAMUSCULAR; INTRAVENOUS; SUBCUTANEOUS
Status: DISCONTINUED | OUTPATIENT
Start: 2017-10-09 | End: 2017-10-11 | Stop reason: HOSPADM

## 2017-10-09 RX ORDER — SODIUM CHLORIDE 9 MG/ML
INJECTION INTRAMUSCULAR; INTRAVENOUS; SUBCUTANEOUS AS NEEDED
Status: DISCONTINUED | OUTPATIENT
Start: 2017-10-09 | End: 2017-10-09 | Stop reason: HOSPADM

## 2017-10-09 RX ORDER — ONDANSETRON 8 MG/1
8 TABLET, ORALLY DISINTEGRATING ORAL
Qty: 60 TAB | Refills: 0 | Status: SHIPPED | OUTPATIENT
Start: 2017-10-09 | End: 2018-02-16 | Stop reason: ALTCHOICE

## 2017-10-09 RX ORDER — MORPHINE SULFATE 10 MG/ML
INJECTION, SOLUTION INTRAMUSCULAR; INTRAVENOUS AS NEEDED
Status: DISCONTINUED | OUTPATIENT
Start: 2017-10-09 | End: 2017-10-09 | Stop reason: HOSPADM

## 2017-10-09 RX ORDER — SODIUM CHLORIDE, SODIUM LACTATE, POTASSIUM CHLORIDE, CALCIUM CHLORIDE 600; 310; 30; 20 MG/100ML; MG/100ML; MG/100ML; MG/100ML
100 INJECTION, SOLUTION INTRAVENOUS CONTINUOUS
Status: DISCONTINUED | OUTPATIENT
Start: 2017-10-09 | End: 2017-10-09 | Stop reason: HOSPADM

## 2017-10-09 RX ORDER — NEOMYCIN AND POLYMYXIN B SULFATES 40; 200000 MG/ML; [USP'U]/ML
SOLUTION IRRIGATION AS NEEDED
Status: DISCONTINUED | OUTPATIENT
Start: 2017-10-09 | End: 2017-10-09 | Stop reason: HOSPADM

## 2017-10-09 RX ORDER — CYCLOBENZAPRINE HCL 10 MG
5 TABLET ORAL 3 TIMES DAILY
Status: DISCONTINUED | OUTPATIENT
Start: 2017-10-09 | End: 2017-10-11 | Stop reason: HOSPADM

## 2017-10-09 RX ORDER — ROPIVACAINE HYDROCHLORIDE 2 MG/ML
INJECTION, SOLUTION EPIDURAL; INFILTRATION; PERINEURAL AS NEEDED
Status: DISCONTINUED | OUTPATIENT
Start: 2017-10-09 | End: 2017-10-09 | Stop reason: HOSPADM

## 2017-10-09 RX ORDER — HYDROMORPHONE HYDROCHLORIDE 2 MG/ML
0.5 INJECTION, SOLUTION INTRAMUSCULAR; INTRAVENOUS; SUBCUTANEOUS
Status: DISCONTINUED | OUTPATIENT
Start: 2017-10-09 | End: 2017-10-09 | Stop reason: HOSPADM

## 2017-10-09 RX ORDER — PROPOFOL 10 MG/ML
INJECTION, EMULSION INTRAVENOUS
Status: DISCONTINUED | OUTPATIENT
Start: 2017-10-09 | End: 2017-10-09 | Stop reason: HOSPADM

## 2017-10-09 RX ORDER — MIDAZOLAM HYDROCHLORIDE 1 MG/ML
2 INJECTION, SOLUTION INTRAMUSCULAR; INTRAVENOUS ONCE
Status: COMPLETED | OUTPATIENT
Start: 2017-10-09 | End: 2017-10-09

## 2017-10-09 RX ORDER — CELECOXIB 200 MG/1
200 CAPSULE ORAL ONCE
Status: COMPLETED | OUTPATIENT
Start: 2017-10-09 | End: 2017-10-09

## 2017-10-09 RX ORDER — LIDOCAINE HYDROCHLORIDE 10 MG/ML
0.1 INJECTION INFILTRATION; PERINEURAL AS NEEDED
Status: DISCONTINUED | OUTPATIENT
Start: 2017-10-09 | End: 2017-10-09 | Stop reason: HOSPADM

## 2017-10-09 RX ORDER — ROPIVACAINE HYDROCHLORIDE 5 MG/ML
INJECTION, SOLUTION EPIDURAL; INFILTRATION; PERINEURAL AS NEEDED
Status: DISCONTINUED | OUTPATIENT
Start: 2017-10-09 | End: 2017-10-09 | Stop reason: HOSPADM

## 2017-10-09 RX ORDER — HYDROMORPHONE HYDROCHLORIDE 2 MG/1
2 TABLET ORAL
Status: DISCONTINUED | OUTPATIENT
Start: 2017-10-09 | End: 2017-10-11 | Stop reason: HOSPADM

## 2017-10-09 RX ORDER — SODIUM CHLORIDE 0.9 % (FLUSH) 0.9 %
5-10 SYRINGE (ML) INJECTION EVERY 8 HOURS
Status: DISCONTINUED | OUTPATIENT
Start: 2017-10-09 | End: 2017-10-09 | Stop reason: HOSPADM

## 2017-10-09 RX ORDER — AMOXICILLIN 250 MG
2 CAPSULE ORAL DAILY
Status: DISCONTINUED | OUTPATIENT
Start: 2017-10-10 | End: 2017-10-11 | Stop reason: HOSPADM

## 2017-10-09 RX ORDER — SODIUM CHLORIDE, SODIUM LACTATE, POTASSIUM CHLORIDE, CALCIUM CHLORIDE 600; 310; 30; 20 MG/100ML; MG/100ML; MG/100ML; MG/100ML
75 INJECTION, SOLUTION INTRAVENOUS CONTINUOUS
Status: DISCONTINUED | OUTPATIENT
Start: 2017-10-09 | End: 2017-10-09 | Stop reason: HOSPADM

## 2017-10-09 RX ORDER — ONDANSETRON 8 MG/1
8 TABLET, ORALLY DISINTEGRATING ORAL
Status: DISCONTINUED | OUTPATIENT
Start: 2017-10-09 | End: 2017-10-11 | Stop reason: HOSPADM

## 2017-10-09 RX ORDER — DIPHENHYDRAMINE HCL 25 MG
25 CAPSULE ORAL
Status: DISCONTINUED | OUTPATIENT
Start: 2017-10-09 | End: 2017-10-11 | Stop reason: HOSPADM

## 2017-10-09 RX ORDER — SODIUM CHLORIDE 9 MG/ML
100 INJECTION, SOLUTION INTRAVENOUS CONTINUOUS
Status: DISPENSED | OUTPATIENT
Start: 2017-10-09 | End: 2017-10-10

## 2017-10-09 RX ORDER — ZOLPIDEM TARTRATE 10 MG/1
10 TABLET ORAL
Qty: 60 TAB | Refills: 0 | Status: SHIPPED | OUTPATIENT
Start: 2017-10-09 | End: 2018-02-16 | Stop reason: ALTCHOICE

## 2017-10-09 RX ORDER — ACETAMINOPHEN 500 MG
1000 TABLET ORAL ONCE
Status: COMPLETED | OUTPATIENT
Start: 2017-10-09 | End: 2017-10-09

## 2017-10-09 RX ORDER — NALOXONE HYDROCHLORIDE 0.4 MG/ML
0.2 INJECTION, SOLUTION INTRAMUSCULAR; INTRAVENOUS; SUBCUTANEOUS AS NEEDED
Status: DISCONTINUED | OUTPATIENT
Start: 2017-10-09 | End: 2017-10-09 | Stop reason: HOSPADM

## 2017-10-09 RX ORDER — CEFAZOLIN SODIUM IN 0.9 % NACL 2 G/50 ML
2 INTRAVENOUS SOLUTION, PIGGYBACK (ML) INTRAVENOUS ONCE
Status: COMPLETED | OUTPATIENT
Start: 2017-10-09 | End: 2017-10-09

## 2017-10-09 RX ORDER — TRANEXAMIC ACID 650 1/1
1300 TABLET ORAL DAILY
Status: COMPLETED | OUTPATIENT
Start: 2017-10-10 | End: 2017-10-11

## 2017-10-09 RX ORDER — CYCLOBENZAPRINE HCL 10 MG
5 TABLET ORAL 3 TIMES DAILY
Qty: 60 TAB | Refills: 0 | Status: SHIPPED | OUTPATIENT
Start: 2017-10-09 | End: 2018-02-16 | Stop reason: ALTCHOICE

## 2017-10-09 RX ORDER — ZOLPIDEM TARTRATE 5 MG/1
5 TABLET ORAL
Status: DISCONTINUED | OUTPATIENT
Start: 2017-10-09 | End: 2017-10-11 | Stop reason: HOSPADM

## 2017-10-09 RX ORDER — HYDROMORPHONE HYDROCHLORIDE 2 MG/1
2 TABLET ORAL
Qty: 90 TAB | Refills: 0 | Status: SHIPPED | OUTPATIENT
Start: 2017-10-09 | End: 2018-02-16 | Stop reason: ALTCHOICE

## 2017-10-09 RX ORDER — DEXAMETHASONE SODIUM PHOSPHATE 100 MG/10ML
10 INJECTION INTRAMUSCULAR; INTRAVENOUS ONCE
Status: COMPLETED | OUTPATIENT
Start: 2017-10-10 | End: 2017-10-10

## 2017-10-09 RX ORDER — ACETAMINOPHEN 10 MG/ML
1000 INJECTION, SOLUTION INTRAVENOUS ONCE
Status: COMPLETED | OUTPATIENT
Start: 2017-10-09 | End: 2017-10-09

## 2017-10-09 RX ORDER — ASPIRIN 325 MG
325 TABLET, DELAYED RELEASE (ENTERIC COATED) ORAL EVERY 12 HOURS
Status: DISCONTINUED | OUTPATIENT
Start: 2017-10-09 | End: 2017-10-11 | Stop reason: HOSPADM

## 2017-10-09 RX ORDER — BUPIVACAINE HYDROCHLORIDE 7.5 MG/ML
INJECTION, SOLUTION INTRASPINAL AS NEEDED
Status: DISCONTINUED | OUTPATIENT
Start: 2017-10-09 | End: 2017-10-09 | Stop reason: HOSPADM

## 2017-10-09 RX ORDER — FLUTICASONE PROPIONATE 50 MCG
2 SPRAY, SUSPENSION (ML) NASAL DAILY
Status: DISCONTINUED | OUTPATIENT
Start: 2017-10-10 | End: 2017-10-11 | Stop reason: HOSPADM

## 2017-10-09 RX ORDER — ACETAMINOPHEN 500 MG
1000 TABLET ORAL EVERY 6 HOURS
Qty: 120 TAB | Refills: 0 | Status: SHIPPED | OUTPATIENT
Start: 2017-10-10 | End: 2018-02-16 | Stop reason: ALTCHOICE

## 2017-10-09 RX ORDER — ACETAMINOPHEN 500 MG
1000 TABLET ORAL EVERY 6 HOURS
Status: DISCONTINUED | OUTPATIENT
Start: 2017-10-10 | End: 2017-10-11 | Stop reason: HOSPADM

## 2017-10-09 RX ORDER — GABAPENTIN 100 MG/1
100 CAPSULE ORAL 2 TIMES DAILY
Status: DISCONTINUED | OUTPATIENT
Start: 2017-10-09 | End: 2017-10-11 | Stop reason: HOSPADM

## 2017-10-09 RX ORDER — ASPIRIN 325 MG
325 TABLET, DELAYED RELEASE (ENTERIC COATED) ORAL EVERY 12 HOURS
Qty: 60 TAB | Refills: 0 | Status: ON HOLD | OUTPATIENT
Start: 2017-10-09 | End: 2018-01-08

## 2017-10-09 RX ORDER — METOPROLOL SUCCINATE 50 MG/1
50 TABLET, EXTENDED RELEASE ORAL DAILY
Status: DISCONTINUED | OUTPATIENT
Start: 2017-10-10 | End: 2017-10-11 | Stop reason: HOSPADM

## 2017-10-09 RX ORDER — GABAPENTIN 100 MG/1
100 CAPSULE ORAL 2 TIMES DAILY
Qty: 60 CAP | Refills: 0 | Status: SHIPPED | OUTPATIENT
Start: 2017-10-09 | End: 2018-02-16 | Stop reason: ALTCHOICE

## 2017-10-09 RX ORDER — POTASSIUM CHLORIDE 750 MG/1
10 TABLET, EXTENDED RELEASE ORAL DAILY
Status: DISCONTINUED | OUTPATIENT
Start: 2017-10-10 | End: 2017-10-11 | Stop reason: HOSPADM

## 2017-10-09 RX ORDER — CELECOXIB 200 MG/1
200 CAPSULE ORAL EVERY 12 HOURS
Status: DISCONTINUED | OUTPATIENT
Start: 2017-10-09 | End: 2017-10-11 | Stop reason: HOSPADM

## 2017-10-09 RX ORDER — OXYCODONE HCL 10 MG/1
10 TABLET, FILM COATED, EXTENDED RELEASE ORAL EVERY 12 HOURS
Status: DISCONTINUED | OUTPATIENT
Start: 2017-10-09 | End: 2017-10-11 | Stop reason: HOSPADM

## 2017-10-09 RX ORDER — LISINOPRIL AND HYDROCHLOROTHIAZIDE 20; 25 MG/1; MG/1
1 TABLET ORAL DAILY
Status: DISCONTINUED | OUTPATIENT
Start: 2017-10-10 | End: 2017-10-11 | Stop reason: HOSPADM

## 2017-10-09 RX ORDER — AMOXICILLIN 250 MG
2 CAPSULE ORAL DAILY
Qty: 120 TAB | Refills: 0 | Status: SHIPPED | OUTPATIENT
Start: 2017-10-10 | End: 2018-02-16 | Stop reason: ALTCHOICE

## 2017-10-09 RX ORDER — OXYCODONE HYDROCHLORIDE 5 MG/1
5 TABLET ORAL
Status: DISCONTINUED | OUTPATIENT
Start: 2017-10-09 | End: 2017-10-09 | Stop reason: HOSPADM

## 2017-10-09 RX ORDER — CELECOXIB 200 MG/1
200 CAPSULE ORAL 2 TIMES DAILY
Qty: 60 CAP | Refills: 2 | Status: SHIPPED | OUTPATIENT
Start: 2017-10-09 | End: 2018-01-08

## 2017-10-09 RX ADMIN — BUPIVACAINE HYDROCHLORIDE 2 ML: 7.5 INJECTION, SOLUTION INTRASPINAL at 08:59

## 2017-10-09 RX ADMIN — TUBERCULIN PURIFIED PROTEIN DERIVATIVE 5 UNITS: 5 INJECTION, SOLUTION INTRADERMAL at 07:05

## 2017-10-09 RX ADMIN — SODIUM CHLORIDE 100 ML/HR: 900 INJECTION, SOLUTION INTRAVENOUS at 12:13

## 2017-10-09 RX ADMIN — HYDROMORPHONE HYDROCHLORIDE 1 MG: 1 INJECTION, SOLUTION INTRAMUSCULAR; INTRAVENOUS; SUBCUTANEOUS at 14:26

## 2017-10-09 RX ADMIN — CELECOXIB 200 MG: 200 CAPSULE ORAL at 20:42

## 2017-10-09 RX ADMIN — SODIUM CHLORIDE, SODIUM LACTATE, POTASSIUM CHLORIDE, AND CALCIUM CHLORIDE: 600; 310; 30; 20 INJECTION, SOLUTION INTRAVENOUS at 08:00

## 2017-10-09 RX ADMIN — SODIUM CHLORIDE 100 ML/HR: 900 INJECTION, SOLUTION INTRAVENOUS at 22:15

## 2017-10-09 RX ADMIN — SODIUM CHLORIDE, SODIUM LACTATE, POTASSIUM CHLORIDE, AND CALCIUM CHLORIDE: 600; 310; 30; 20 INJECTION, SOLUTION INTRAVENOUS at 10:25

## 2017-10-09 RX ADMIN — ROPIVACAINE HYDROCHLORIDE 20 ML: 5 INJECTION, SOLUTION EPIDURAL; INFILTRATION; PERINEURAL at 08:42

## 2017-10-09 RX ADMIN — MIDAZOLAM HYDROCHLORIDE 2 MG: 1 INJECTION, SOLUTION INTRAMUSCULAR; INTRAVENOUS at 08:39

## 2017-10-09 RX ADMIN — SODIUM CHLORIDE, SODIUM LACTATE, POTASSIUM CHLORIDE, AND CALCIUM CHLORIDE 100 ML/HR: 600; 310; 30; 20 INJECTION, SOLUTION INTRAVENOUS at 07:03

## 2017-10-09 RX ADMIN — SODIUM CHLORIDE, SODIUM LACTATE, POTASSIUM CHLORIDE, AND CALCIUM CHLORIDE: 600; 310; 30; 20 INJECTION, SOLUTION INTRAVENOUS at 09:15

## 2017-10-09 RX ADMIN — HYDROMORPHONE HYDROCHLORIDE 2 MG: 2 TABLET ORAL at 13:26

## 2017-10-09 RX ADMIN — OXYCODONE HYDROCHLORIDE 10 MG: 10 TABLET, FILM COATED, EXTENDED RELEASE ORAL at 15:41

## 2017-10-09 RX ADMIN — ACETAMINOPHEN 1000 MG: 10 INJECTION, SOLUTION INTRAVENOUS at 15:42

## 2017-10-09 RX ADMIN — ZOLPIDEM TARTRATE 5 MG: 5 TABLET ORAL at 20:42

## 2017-10-09 RX ADMIN — CYCLOBENZAPRINE HYDROCHLORIDE 5 MG: 10 TABLET, FILM COATED ORAL at 14:27

## 2017-10-09 RX ADMIN — LIDOCAINE HYDROCHLORIDE 0.1 ML: 10 INJECTION, SOLUTION INFILTRATION; PERINEURAL at 07:03

## 2017-10-09 RX ADMIN — GABAPENTIN 100 MG: 100 CAPSULE ORAL at 15:42

## 2017-10-09 RX ADMIN — REGULAR STRENGTH 325 MG: 325 TABLET ORAL at 20:42

## 2017-10-09 RX ADMIN — FENTANYL CITRATE 50 MCG: 50 INJECTION, SOLUTION INTRAMUSCULAR; INTRAVENOUS at 08:39

## 2017-10-09 RX ADMIN — CEFAZOLIN 2 G: 1 INJECTION, POWDER, FOR SOLUTION INTRAMUSCULAR; INTRAVENOUS; PARENTERAL at 08:47

## 2017-10-09 RX ADMIN — HYDROMORPHONE HYDROCHLORIDE 1 MG: 1 INJECTION, SOLUTION INTRAMUSCULAR; INTRAVENOUS; SUBCUTANEOUS at 20:42

## 2017-10-09 RX ADMIN — ACETAMINOPHEN 1000 MG: 500 TABLET, FILM COATED ORAL at 07:03

## 2017-10-09 RX ADMIN — PROPOFOL 120 MCG/KG/MIN: 10 INJECTION, EMULSION INTRAVENOUS at 09:05

## 2017-10-09 RX ADMIN — CEFAZOLIN 2 G: 1 INJECTION, POWDER, FOR SOLUTION INTRAMUSCULAR; INTRAVENOUS; PARENTERAL at 14:27

## 2017-10-09 RX ADMIN — CYCLOBENZAPRINE HYDROCHLORIDE 5 MG: 10 TABLET, FILM COATED ORAL at 20:42

## 2017-10-09 RX ADMIN — CELECOXIB 200 MG: 200 CAPSULE ORAL at 07:03

## 2017-10-09 RX ADMIN — HYDROMORPHONE HYDROCHLORIDE 1 MG: 1 INJECTION, SOLUTION INTRAMUSCULAR; INTRAVENOUS; SUBCUTANEOUS at 17:10

## 2017-10-09 NOTE — OP NOTES
[facility]  Total Knee Arthroplasty  Patient:Abrahan Child   : 1965  Medical Record [id number]    Pre-operative Diagnosis:  Primary osteoarthritis of left knee [M17.12]    Post-operative Diagnosis: Primary osteoarthritis of left knee [M17.12]    Location: [place] [address]    Date of Procedure: 10/9/2017    Surgeon: Pilo Carrero MD    Assistant: NONE    Anesthesia: Spinal and adductor nerve block    Procedure:  Left Total Knee Arthroplasty    Tourniquet Time: 0 minutes    EBL: 042DH     Complications: None    Patient Condition upon Completion of Procedure: Stable    Implants:   Implant Name Type Inv. Item Serial No.  Lot No. LRB No. Used   PAT ASYM MTL-BK 11MM SZ A40 -- TRIATHLON - [id number]  PAT ASYM MTL-BK 11MM SZ A40 -- TRIATHLON D5PP [employer] HOW D5PP Left 1   COMPNT FEM CR TRIATHLN 7 L PA --  - [id number]  COMPNT FEM CR TRIATHLN 7 L PA --  [id number] [employer] HOW [id number] Left 1   BASEPLT TIB PC TRITNM SZ 8 -- TRIATHLON - [id number]  BASEPLT TIB PC TRITNM SZ 8 -- TRIATHLON [id number] [employer] HOW [id number] Left 1   8, CS, 9MM TIBIAL BEARING INSERT - CS      [id number] [employer] [id number] Left 1       Intra-Operative Findings: Pre-operatively we assess the motion of the patients knee and noted that the knee lacked approximately 0 degrees of extension. Intra-operatively we noted that the articular surfaces were arthritic with cartilage loss of both the medial and lateral compartments. The patella was examined and found to be in poor condition. The patella was  resurfaced. With trial components in place we assessed knee motion and found that the knee was able to fully extend. Flexion was felt to be 135 degrees. Description of Procedure:    Nilsa Collins had undergone adductor canal block in the preoperative holding area.  Nilsa Collins was brought to the operating room, positioned on the operating room table, and after appropriate identification underwent spinal anesthesia. A zarco catheter was then placed. IV antibiotics were administered per proticol. A pneumatic tourniquet was placed about the limb. The left leg and was then prepped and draped in the usual sterile manner. Timeout was taken identifying the patient, procedure ,operative side and surgeon. Prior to incision one gram of IV transexamic acid was administered intravenously. An anterior longitudinal incision was made just medial to the tibial tubercle and extending proximal.  A subvastas capsular incision was performed. The medial capsular flap was elevated around to the midcoronal plane. The patella was subluxed laterally and patellar fat pat partially excised. The knee was flexed and externally rotated. The articular surface revealed cartilage loss with exposed bone and bone spurs throughout all three compartments. The anterior cruciate ligament was resected. We first addressed our distal femoral resection. Using intramedullary instrumentation we resected 8mm of distal femur using a 5 degree valgus cut angle. Medial and lateral condylar cuts were verified to be level using the capture of the distal femoral cutting jig. We next addressed our proximal tibia. Using extramedullary intrstrumentation we resected 2mm of bone as measured from the more involved compartment. Our cut was verified to be perpendicular to the meachanical axis of the tibia as referenced from the tibial tubercle, the long axis of the tibia, the center of the ankle, and the patients 2nd toe. Our slope was cut with the goal of 0-3 degrees posterior slope. At this point a spacer block was used to verify that the knee was able to obtain full extension and was balanced in extension.  We did not have to resect additional bone to achieve this goal. Once the knee was felt to fully extend and showed good balance in extension the distal femoral pins were removed and we moved on to finishing our distal femoral preparation. Prior to sizing our distal femur we marked Austin's Line and our transepicondylar axis. Using sizing instrumentation the femur was sized to a #7 component. The anterior and posterior cuts along with the anterior and posterior chamfer cuts were then made using the 4-in-1 cutting block. Osteophytes were removed from the tibial and femoral surfaces. The PCL was assessed and felt to be in good condition and be very stable. Medial and lateral meniscus' were removed along with any redundant tissue. Any posterior osteophytes were removed. We then returned our attention to the tibial side. The tibia was then sized to a #8 component. The tibial component was assessed in terms of position and rotation. Once we felt that we had obtained symmetric coverage of the proximal tibia and had rotated the tibial tray to a point where the midline of the component was bisecting the middle and medial 3rd of the tibial tubercle we marked the proximal tibia with bovie cautery to kavon rotation and also pinned the tibial tray into place. We then performed a trial of the knee with trial components in place. We felt that with a 9 mm polyethylene trial in place the knee had acceptable varus and valgus stability throughout arc of motion, was able to fully extend, was not too tight in flexion, and had acceptable stability with anterior  Drawer testing. No additional surgical releases were required. Again we assessed our PCL and felt it was stable and in good condition. The patella was then everted and examined. The patella was felt to be in poor condition and the decision was made to  resurface the patella. Bone was resected to accomodate a size 40mm patella button. A trial reduction revealed appropriate tracking through the patellofemoral groove with no lateral retinacular release required.  Again patellar tracking was assessed and it was felt that the patella was tracking appropriately within the femoral trochlear groove. At this point the trial polyethylene component and trial femoral component were removed. We again assessed our tibial tray and verified that we were satisfied with its position. We did not have to adjust its position. The proximal tibia was punched and drilled per protocol for the system. We irrigated and debrided all bony surfaces of residual tissue and bone. We then inserted the tibial and femoral components and noted them to be well seated. We then inserted our final polyethylene component which was a 9mm thick CS insert. Pauline Kleins knee was placed through a range of motion and noted to be stable as mentioned above with the trial components. In additional we checked patellar tracking one last time which was felt to be appropriate. A lavage of diluted betadine solution of  17.5 ml Betadine in 500 of 0.9% normal saline was allowed to soak in the wound for 3 minutes after implanting of the prosthesis. The wound was irrigated with normal saline again before closure. We then carefully injected periarticular cocktail about and capsule and subcutaneous tissue. In addition, one additional gram of transexemic acid was given at the end of the case for a total dose of 2 grams. No drain was placed. The capsular layer was closed using a combination of 0-Stratafix bidirectional barbed suture and #2 Fiberwire. . The subcutaneous layer was closed using a 2-0 Vicryl interrupted suture. The skin was closed using staples. A sterile dressing was applied. A cryo pad was applied on the operative leg. The sponge count and needle counts were correct. Patient was transferred to the hospital stretcher and transported to recovery in stable condition.     Signed By: Gregory Medina MD

## 2017-10-09 NOTE — PERIOP NOTES
TRANSFER - OUT REPORT:    Verbal report given to [facility] on [employer]  being transferred to [facility] for routine progression of care       Report consisted of patients Situation, Background, Assessment and   Recommendations(SBAR). Information from the following report(s) SBAR, MAR and Recent Results was reviewed with the receiving nurse. Lines:   Peripheral IV 10/09/17 Left Hand (Active)   Site Assessment Clean, dry, & intact 10/9/2017  6:52 AM   Phlebitis Assessment 0 10/9/2017  6:52 AM   Dressing Status Clean, dry, & intact 10/9/2017  6:52 AM   Dressing Type Transparent;Tape 10/9/2017  6:52 AM   Hub Color/Line Status Green; Infusing 10/9/2017  6:52 AM   Action Taken Blood drawn 10/9/2017  6:52 AM        Opportunity for questions and clarification was provided.       Patient transported with:   [employer]

## 2017-10-09 NOTE — ANESTHESIA PROCEDURE NOTES
Spinal Block    Start time: 10/9/2017 8:56 AM  End time: 10/9/2017 8:59 AM  Performed by: Sagar Gonzalez  Authorized by: Sagar Gonzalez     Pre-procedure:   Indications: at surgeon's request and primary anesthetic  Preanesthetic Checklist: patient identified, risks and benefits discussed, anesthesia consent, site marked, patient being monitored and timeout performed    Timeout Time: 08:56          Spinal Block:   Patient Position:  Seated  Prep Region:  Lumbar  Prep: chlorhexidine and patient draped      Location:  L3-4  Technique:  Single shot  Local:  Lidocaine 1%  Local Dose (mL):  3    Needle:   Needle Type:  Pencan  Needle Gauge:  25 G  Attempts:  1      Events: CSF confirmed, no blood with aspiration and no paresthesia        Assessment:  Insertion:  Uncomplicated  Patient tolerance:  Patient tolerated the procedure well with no immediate complications

## 2017-10-09 NOTE — ANESTHESIA POSTPROCEDURE EVALUATION
Post-Anesthesia Evaluation and Assessment    Patient: Rusty Bass MRN: [id number]  SSN: [id number]    YOB: 1965  Age: 45 y.o. Sex: male       Cardiovascular Function/Vital Signs  Visit Vitals    /85 (BP 1 Location: Right arm, BP Patient Position: At rest)    Pulse (!) 56    Temp 36.3 °C (97.3 °F)    Resp 18    SpO2 100%       Patient is status post spinal anesthesia for Procedure(s):  LEFT KNEE ARTHROPLASTY TOTAL/ [employer]LY. Nausea/Vomiting: None    Postoperative hydration reviewed and adequate. Pain:  Pain Scale 1: Visual (10/09/17 1124)  Pain Intensity 1: 0 (10/09/17 1124)   Managed    Neurological Status:   Neuro (WDL): Exceptions to WDL (10/09/17 1124)  Neuro  Neurologic State: Stuporous (10/09/17 1124)  LUE Motor Response: Purposeful (10/09/17 1124)  LLE Motor Response: Numbness; Pharmacologically paralyzed (10/09/17 1124)  RUE Motor Response: Purposeful (10/09/17 1124)  RLE Motor Response: Numbness; Pharmacologically paralyzed (10/09/17 1124)   At baseline    Mental Status and Level of Consciousness: Arousable    Pulmonary Status:   O2 Device: Nasal cannula (10/09/17 1124)   Adequate oxygenation and airway patent    Complications related to anesthesia: None    Post-anesthesia assessment completed.  No concerns    Signed By: Rafi Nice MD     October 9, 2017

## 2017-10-09 NOTE — PROGRESS NOTES
Problem: Mobility Impaired (Adult and Pediatric)  Goal: *Acute Goals and Plan of Care (Insert Text)  GOALS (1-4 days):  (1.)Mr. Mynor Hill will move from supine to sit and sit to supine in bed with SUPERVISION. (2.)Mr. Mynor Hill will transfer from bed to chair and chair to bed with STAND BY ASSIST using the least restrictive device. (3.)Mr. Mynor Hill will ambulate with STAND BY ASSIST for 200 feet with the least restrictive device. (4.)Mr. Mynor Hill will ambulate up/down 1 step with no railing with CONTACT GUARD ASSIST with walker. (5.)Mr. Mynor Hill will increase left knee ROM to 5°-80°.  ________________________________________________________________________________________________      PHYSICAL THERAPY JOINT CAMP TKA: INITIAL ASSESSMENT, TREATMENT DAY: DAY OF ASSESSMENT, PM 10/9/2017  INPATIENT: Hospital Day: 1  Payor: Antonieta Dominique / Plan: [facility] / Product Type: PPO /      NAME/AGE/GENDER: Rachana Bentley is a 45 y.o. male      PRIMARY DIAGNOSIS:  Primary osteoarthritis of left knee [M17.12]              Procedure(s) and Anesthesia Type:     * LEFT KNEE ARTHROPLASTY TOTAL/ WESLY - Spinal (Left)  ICD-10: Treatment Diagnosis:        · Pain in Left Knee (M25.562)  · Stiffness of Left Knee, Not elsewhere classified (M25.662)  · Difficulty in walking, Not elsewhere classified (R26.2)       ASSESSMENT:      Mr. Mynor Hill presents with impaired strength & mobility s/p left TKA. Pt also had decreased stability during out of bed activity. Pt will benefit from follow up therapy to help restore safe function prior to returning home with caregiver. This section established at most recent assessment   PROBLEM LIST (Impairments causing functional limitations):  1. Decreased Strength  2. Decreased ADL/Functional Activities  3. Decreased Transfer Abilities  4. Decreased Ambulation Ability/Technique  5. Decreased Balance  6. Increased Pain  7. Decreased Activity Tolerance  8.  Decreased Flexibility/Joint Mobility  9. Decreased [place] with Home Exercise Program    INTERVENTIONS PLANNED: (Benefits and precautions of physical therapy have been discussed with the patient.)  1. Bed Mobility  2. Gait Training  3. Home Exercise Program (HEP)  4. Therapeutic Exercise/Strengthening  5. Transfer Training  6. Range of Motion: active/assisted/passive  7. Therapeutic Activities  8. Group Therapy      TREATMENT PLAN: Frequency/Duration: Follow patient BID   to address above goals. Rehabilitation Potential For Stated Goals: GOOD      RECOMMENDED REHABILITATION/EQUIPMENT: (at time of discharge pending progress): Continue Skilled Therapy and Home Health: Physical Therapy. HISTORY:   History of Present Injury/Illness (Reason for Referral): The patient has end stage arthritis of the left knee. The patient was evaluated and examined during consultation prior to today. The patient complains of knee pain with activities, reports pain as mostly occurring along the joint lines, reports stiffness of the knee after inactivity, and swelling/pain at the end of the day and after increased physical activity. The pain affects the patients activities of daily living and quality of life. The patient has attempted and exhausted conservative treatment. There have been no changes to the patient's orthopedic condition since the last office visit. Past Medical History/Comorbidities:   Mr. Claudio Rush  has a past medical history of Adverse effect of anesthesia; BMI 31.0-31.9,adult; Chronic pain; DDD (degenerative disc disease), cervical (07/20/2016); GERD (gastroesophageal reflux disease); History of kidney stones; Hypertension; Nausea & vomiting; OA (osteoarthritis); PUD (peptic ulcer disease); Sleep apnea; Thromboembolus ([address] (2010); Thromboembolus ([address] (2007); and Vertigo. He also has no past medical history of Aneurysm ([address] Arrhythmia; Asthma; Autoimmune disease ([address] CAD (coronary artery disease); Cancer ([address]  Chronic kidney disease; Chronic obstructive pulmonary disease ([facility] Coagulation disorder ([facility] Diabetes ([facility] Difficult intubation; Endocarditis; Heart failure ([facility] Ill-defined condition; Liver disease; Malignant hyperthermia due to anesthesia; Nicotine vapor product user; Non-nicotine vapor product user; Other ill-defined conditions(799.89); Pseudocholinesterase deficiency; Psychiatric disorder; Rheumatic fever; Seizures ([facility] Stroke [facility]); Thyroid disease; or Unspecified adverse effect of anesthesia. Mr. Param Sellers  has a past surgical history that includes appendectomy (as teen ); lap cholecystectomy (2011); knee arthroscopy (Left); cervical fusion (1998); lumbar laminectomy (1995); cervical diskectomy (2015); tonsillectomy (1993); hernia repair (Bilateral, 2016); septoplasty (2014); hernia repair; lithotripsy; and lysis of adhesions (2012). Social History/Living Environment:   Home Environment: Private residence  # Steps to Enter: 1  Rails to Enter: No  One/Two Story Residence: Two story  # of Interior Steps: [address]: Left  Living Alone: Yes  Support Systems: Spouse/Significant Other/Partner  Patient Expects to be Discharged to[de-identified] Private residence  Current DME Used/Available at Home: None  Tub or Shower Type: Tub/Shower combination  Prior Level of Function/Work/Activity:  Pt was independent without an assistive device prior to this [employer]   Number of Personal Factors/Comorbidities that affect the Plan of Care: 3+: HIGH COMPLEXITY   EXAMINATION:   Most Recent Physical Functioning:      Gross Assessment  AROM: Within functional limits (right LE)  Strength:  Within functional limits (right LE)  Coordination: Within functional limits (right LE)            LLE PROM  L Knee Flexion: 65 (~post op)  L Knee Extension: -10 (~post op)           Bed Mobility  Supine to Sit: Contact guard assistance  Sit to Supine: Contact guard assistance  Scooting: Contact guard assistance     Transfers  Sit to Stand: Minimum assistance  Stand to Sit: Minimum assistance  Bed to Chair:  (NT)     Balance  Sitting: Intact; Without support  Standing: Impaired; With support (walker)                Weight Bearing Status  Left Side Weight Bearing: As tolerated  Distance (ft): 5 Feet (ft)  Ambulation - Level of Assistance: Minimal assistance  Assistive Device: Walker, rolling  Speed/Ana Laura: Shuffled; Slow  Step Length: Left shortened;Right shortened  Stance: Left decreased  Gait Abnormalities: Antalgic;Decreased step clearance         Braces/Orthotics: none     Left Knee Cold  Type: Cryocuff       Body Structures Involved:  1. Joints  2. Muscles Body Functions Affected:  1. Sensory/Pain  2. Movement Related Activities and Participation Affected:  1. General Tasks and Demands  2. Mobility   Number of elements that affect the Plan of Care: 4+: HIGH COMPLEXITY   CLINICAL PRESENTATION:   Presentation: Stable and uncomplicated: LOW COMPLEXITY   CLINICAL DECISION MAKIN[address] [place] [id number] AM-PAC 6 Clicks   Basic Mobility Inpatient Short Form  How much difficulty does the patient currently have. .. Unable A Lot A Little None   1. Turning over in bed (including adjusting bedclothes, sheets and blankets)? [ ] 1   [ ] 2   [X] 3   [ ] 4   2. Sitting down on and standing up from a chair with arms ( e.g., wheelchair, bedside commode, etc.)   [ ] 1   [ ] 2   [X] 3   [ ] 4   3. Moving from lying on back to sitting on the side of the bed? [ ] 1   [ ] 2   [X] 3   [ ] 4   How much help from another person does the patient currently need. .. Total A Lot A Little None   4. Moving to and from a bed to a chair (including a wheelchair)? [ ] 1   [ ] 2   [X] 3   [ ] 4   5. Need to walk in hospital room? [ ] 1   [ ] 2   [X] 3   [ ] 4   6. Climbing 3-5 steps with a railing? [X] 1   [ ] 2   [ ] 3   [ ] 4   © , Trustees of [address] [place], [place] [address], under license to [employer].  All rights reserved       Score:  Initial: 16 Most Recent: X (Date: -- ) Interpretation of Tool:  Represents activities that are increasingly more difficult (i.e. Bed mobility, Transfers, Gait). Score 24 23 22-20 19-15 14-10 9-7 6       Modifier CH CI CJ CK CL CM CN         · Mobility - Walking and Moving Around:               - CURRENT STATUS:    CK - 40%-59% impaired, limited or restricted               - GOAL STATUS:           CJ - 20%-39% impaired, limited or restricted               - D/C STATUS:                       ---------------To be determined---------------  Payor: BLUE CROSS / Plan: [place]OSS [facility] / Product Type: PPO /       Medical Necessity:     · Patient is expected to demonstrate progress in strength, range of motion, balance, coordination and functional technique to decrease assistance required with bed mobility, transfers & gait. Reason for Services/Other Comments:  · Patient continues to require skilled intervention due to pt not independent with functional mobility.    Use of outcome tool(s) and clinical judgement create a POC that gives a: Clear prediction of patient's progress: LOW COMPLEXITY                 TREATMENT:   (In addition to Assessment/Re-Assessment sessions the following treatments were rendered)      Pre-treatment Symptoms/Complaints:  none  Pain: Initial: visual scale  Pain Intensity 1: 3  Pain Location 1: Knee  Pain Orientation 1: Left  Pain Intervention(s) 1: Cold pack, Repositioned  Post Session:  3/10      Assessment/Reassessment only, no treatment provided today           Date:    Date:    Date:      ACTIVITY/EXERCISE AM PM AM PM AM PM   GROUP THERAPY  [ ]  [ ]  [ ]  [ ]  [ ]  [ ]   Ankle Pumps               Quad Sets               Gluteal Sets               Hip ABd/ADduction               Straight Leg Raises               Knee Slides               Short Arc Quads               Long Arc Quads               Chair Slides                               B = bilateral; AA = active assistive; A = active; P = passive       Treatment/Session Assessment:         Response to Treatment: tolerated well     Education:  [ ] Home Exercises  [X] Fall Precautions  [ ] Hip Precautions [ ] D/C Instruction Review  [ ] Knee/Hip Prosthesis Review  [X] Walker Management/Safety [ ] Adaptive Equipment as Needed         Interdisciplinary Collaboration:   · Occupational Therapist  · Registered Nurse     After treatment position/precautions:   · Supine in bed  · Bed/Chair-wheels locked  · Bed in low position  · Caregiver at bedside  · Call light within reach  · RN notified  · Family at bedside     Compliance with Program/Exercises: Will assess as treatment progresses. Recommendations/Intent for next treatment session:  Treatment next visit will focus on increasing Mr. Jimena Crouch independence with bed mobility, transfers, gait training, strength/ROM exercises, modalities for pain, and patient education.        Total Treatment Duration:  PT Patient Time In/Time Out  Time In: 1400  Time Out: [address] [place], PT

## 2017-10-09 NOTE — PROGRESS NOTES
TRANSFER - IN REPORT:    Verbal report received from [facility] (name) on Ligia Emerson  being received from PACU(unit) for routine progression of care      Report consisted of patients Situation, Background, Assessment and   Recommendations(SBAR). Information from the following report(s) SBAR, Kardex, OR Summary, Procedure Summary, Intake/Output, MAR, Accordion, Recent Results and Med Rec Status was reviewed with the receiving nurse. Opportunity for questions and clarification was provided. Assessment completed upon patients arrival to unit and care assumed.

## 2017-10-09 NOTE — PERIOP NOTES
TRANSFER - OUT REPORT:    Verbal report given to Danay Astorga RN(name) on Betsy Johnson  being transferred to 331(orthopedic unit) for routine post - op       Report consisted of patients Situation, Background, Assessment and   Recommendations(SBAR). Information from the following report(s) SBAR, OR Summary, Procedure Summary, Intake/Output and MAR was reviewed with the receiving nurse. Opportunity for questions and clarification was provided.       Patient transported with:   O2 @ 2 liters

## 2017-10-09 NOTE — PROGRESS NOTES
Oxygen rounds:     10/09/17 1403   Oxygen Therapy   O2 Sat (%) 98 %   Pulse via Oximetry 53 beats per minute   O2 Device Room air

## 2017-10-09 NOTE — PERIOP NOTES
TRANSFER - IN REPORT:    Verbal report received from [facility]  on Albina Garza  being received from [facility] [place]  for routine progression of care      Report consisted of patients Situation, Background, Assessment and   Recommendations(SBAR). Information from the following report(s) SBAR, Kardex, Intake/Output and MAR was reviewed with the receiving nurse. Opportunity for questions and clarification was provided. Assessment completed upon patients arrival to unit and care assumed.

## 2017-10-09 NOTE — PERIOP NOTES
Teach back method used in review of Incentive Spirometer, Hibiclens usage preop, TB screening and pain management goals.

## 2017-10-09 NOTE — PROGRESS NOTES
10/09/17 1310   Oxygen Therapy   O2 Sat (%) 100 %   Pulse via Oximetry 55 beats per minute   O2 Device Nasal cannula   O2 Flow Rate (L/min) 2 l/min  (weaned to room air)   Patient achieved   3500     Ml/sec on IS. Patient encouraged to do every hour while awake-patient agreed and demonstrated. No shortness of breath or distress noted. BS are clear b/l.    [facility] [place] notes reviewed- patient brought home cpap unit

## 2017-10-09 NOTE — IP AVS SNAPSHOT
Blain Kehr 
 
 
 [address] 
[phone] Patient: Shona Gutierres MRN: [id number] :1965 You are allergic to the following Allergen Reactions Codeine Itching Immunizations Administered for This Admission Name Date  
 TB Skin Test (PPD) Intradermal  Deferred (), 10/9/2017 Recent Documentation Smoking Status Never Smoker Emergency Contacts Name Discharge Info Relation Home Work Mobile Jeannette Child DISCHARGE CAREGIVER [3] Spouse [3] [phone] Melody Bernstein  Sister [23] [phone] About your hospitalization You were admitted on:  2017 You last received care in the:  [facility] You were discharged on:  2017 Unit phone number:  [phone] Why you were hospitalized Your primary diagnosis was:  Not on File Your diagnoses also included:  Oa (Osteoarthritis) Of Knee Providers Seen During Your Hospitalizations Provider Role Specialty Primary office phone Roscoe Mckenna MD Attending Provider Orthopedic Surgery [phone] Your Primary Care Physician (PCP) Primary Care Physician Office Phone Office Fax Delvis Saavedra [phone] Follow-up Information Follow up With Details Comments Contact Info Lea Delgado MD  As needed [address] [facility] 
[phone] Roscoe Mckenna MD  As scheduled by office [address] [facility] [address] [place] [address] [place] [address] Rd 
[phone] [address]  Will contact you within 48 hrs [facility] [address] [facility] [address] 
[phone] Current Discharge Medication List  
  
START taking these medications Dose & Instructions Dispensing Information Comments Morning Noon Evening Bedtime  
 acetaminophen 500 mg tablet Commonly known as:  TYLENOL Your next dose is: Today Dose:  1000 mg Take 2 Tabs by mouth every six (6) hours. Quantity:  120 Tab Refills:  0  
     
   
   
  
   
  
 celecoxib 200 mg capsule Commonly known as:  CELEBREX Your next dose is: Today Dose:  200 mg Take 1 Cap by mouth two (2) times a day for 90 days. Quantity:  60 Cap Refills:  2  
     
   
   
  
   
  
 cyclobenzaprine 10 mg tablet Commonly known as:  FLEXERIL Your next dose is: Today Dose:  5 mg Take 0.5 Tabs by mouth three (3) times daily. Quantity:  60 Tab Refills:  0  
     
   
   
  
   
  
 gabapentin 100 mg capsule Commonly known as:  NEURONTIN Your next dose is: Today Dose:  100 mg Take 1 Cap by mouth two (2) times a day. Quantity:  60 Cap Refills:  0 HYDROmorphone 2 mg tablet Commonly known as:  DILAUDID Your next dose is: Today Dose:  2 mg Take 1 Tab by mouth every four (4) hours as needed. Max Daily Amount: 12 mg. Quantity:  90 Tab Refills:  0  
     
   
   
  
   
  
 ondansetron 8 mg disintegrating tablet Commonly known as:  ZOFRAN ODT Your next dose is: Today Dose:  8 mg Take 1 Tab by mouth every eight (8) hours as needed for Nausea. Quantity:  60 Tab Refills:  0  
     
   
   
  
   
  
 senna-docusate 8.6-50 mg per tablet Commonly known as:  Tamanna  Your next dose is:  Tomorrow Dose:  2 Tab Take 2 Tabs by mouth daily. Quantity:  120 Tab Refills:  0  
     
  
   
   
   
  
 zolpidem 10 mg tablet Commonly known as:  AMBIEN Your next dose is: Today Dose:  10 mg Take 1 Tab by mouth nightly as needed for Sleep. Max Daily Amount: 10 mg.  
 Quantity:  60 Tab Refills:  0 CONTINUE these medications which have CHANGED Dose & Instructions Dispensing Information Comments Morning Noon Evening Bedtime aspirin delayed-release 325 mg tablet What changed:   
- medication strength 
- how much to take - when to take this 
- additional instructions Your next dose is: Today Dose:  325 mg Take 1 Tab by mouth every twelve (12) hours every twelve (12) hours. Quantity:  60 Tab Refills:  0  
     
   
   
  
   
  
 * lisinopril-hydroCHLOROthiazide 20-25 mg per tablet Commonly known as:  Court Mane What changed:  Another medication with the same name was added. Make sure you understand how and when to take each. Your next dose is:  Tomorrow Dose:  1 Tab Take 1 Tab by mouth daily. Quantity:  90 Tab Refills:  3  
     
  
   
   
   
  
 * lisinopril-hydroCHLOROthiazide 20-25 mg per tablet Commonly known as:  Court Mane What changed: You were already taking a medication with the same name, and this prescription was added. Make sure you understand how and when to take each. Your next dose is:  Tomorrow TAKE 1 TABLET DAILY Quantity:  90 Tab Refills:  3  
     
  
   
   
   
  
 * potassium chloride SR 10 mEq tablet Commonly known as:  KLOR-CON 10 What changed:  Another medication with the same name was added. Make sure you understand how and when to take each. Your next dose is:  Tomorrow Dose:  10 mEq Take 1 Tab by mouth daily. Quantity:  90 Tab Refills:  3  
     
  
   
   
   
  
 * potassium chloride SR 10 mEq tablet Commonly known as:  KLOR-CON 10 What changed: You were already taking a medication with the same name, and this prescription was added. Make sure you understand how and when to take each. Your next dose is:  Tomorrow TAKE 1 TABLET DAILY Quantity:  90 Tab Refills:  3  
     
  
   
   
   
  
 * Notice: This list has 4 medication(s) that are the same as other medications prescribed for you. Read the directions carefully, and ask your doctor or other care provider to review them with you. CONTINUE these medications which have NOT CHANGED Dose & Instructions Dispensing Information Comments Morning Noon Evening Bedtime  
 cpap machine kit  
   
 by Does Not Apply route. 12 cm Refills:  0  
     
   
   
   
  
 fluticasone 50 mcg/actuation nasal spray Commonly known as:  Kandis Jackson Your next dose is:  Tomorrow 2 sprays to each nostril QD Quantity:  3 Bottle Refills:  11  
     
  
   
   
   
  
 metoprolol succinate 50 mg XL tablet Commonly known as:  TOPROL-XL Your next dose is:  Tomorrow Dose:  50 mg Take 1 Tab by mouth daily. Quantity:  90 Tab Refills:  3 Motion Sickness Relief(Mecliz) 25 mg chewable tablet Generic drug:  meclizine Your next dose is: Today TAKE 1 TABLET BY MOUTH EVERY 4 HOURS AS NEEDED FOR VERTIGO OR NAUSEA Refills:  0 STOP taking these medications   
 diclofenac EC 75 mg EC tablet Commonly known as:  VOLTAREN  
   
  
 HYDROcodone-homatropine 5-1.5 mg/5 mL (5 mL) syrup Commonly known as:  HYCODAN  
   
  
 traMADol 50 mg tablet Commonly known as:  ULTRAM  
   
  
  
  
Where to Get Your Medications These medications were sent to [address], [address] [place] [address] Phone:  [phone]  
  lisinopril-hydroCHLOROthiazide 20-25 mg per tablet  
 potassium chloride SR 10 mEq tablet Information on where to get these meds will be given to you by the nurse or doctor. ! Ask your nurse or doctor about these medications  
  acetaminophen 500 mg tablet  
 aspirin delayed-release 325 mg tablet  
 celecoxib 200 mg capsule  
 cyclobenzaprine 10 mg tablet  
 gabapentin 100 mg capsule HYDROmorphone 2 mg tablet  
 ondansetron 8 mg disintegrating tablet  
 senna-docusate 8.6-50 mg per tablet  
 zolpidem 10 mg tablet Discharge Instructions [facility] [employer] Patient Discharge Instructions Addison Holguin / [id number] : 1965 Admitted 10/9/2017 Discharged: 10/11/2017 IF YOU HAVE ANY PROBLEMS ONCE YOU ARE AT HOME CALL THE FOLLOWING NUMBERS:  
Main office number: [phone] Take Home Medications · It is important that you take the medication exactly as they are prescribed. · Keep your medication in the bottles provided by the pharmacist and keep a list of the medication names, dosages, and times to be taken in your wallet. · Do not take other medications without consulting your doctor. What to do at [facility] Resume your prehospital diet. If you have excessive nausea or vomitting call your doctor's office Home Physical Therapy is arranged. Use rolling walker when walking. Use Chano Hose stockings until we see you in the office for your follow up appointment with Dr. Nara Gaston. Patients who have had a joint replacement should not drive until you are seen for your follow up appointment by Dr. Nara Gaston. When to Call - Call if you have a temperature greater then 101 
- Unable to keep food down - Loose control of your bladder or bowel function - Are unable to bear any weight  
- Need a pain medication refill DISCHARGE SUMMARY from Nurse The following personal items collected during your admission are returned to you:  
Dental Appliance: Dental Appliances: None Vision: Visual Aid: Glasses Hearing Aid:   na 
Jewelry: Jewelry: None Clothing:   self Other Valuables: Other Valuables: None (denies valuables) Valuables sent to safe:   na 
 
PATIENT INSTRUCTIONS: 
 
After general anesthesia or intravenous sedation, for 24 hours or while taking prescription Narcotics: · Limit your activities · Do not drive and operate hazardous machinery · Do not make important personal or business decisions · Do  not drink alcoholic beverages · If you have not urinated within 8 hours after discharge, please contact your surgeon on call. Report the following to your surgeon: 
· Excessive pain, swelling, redness or odor of or around the surgical area · Temperature over 101 · Nausea and vomiting lasting longer than 4 hours or if unable to take medications · Any signs of decreased circulation or nerve impairment to extremity: change in color, persistent  numbness, tingling, coldness or increase pain · Any questions, call office @ [phone] Keep scheduled follow up appointment. If need to change, call office @ [phone]. *  Please give a list of your current medications to your Primary Care Provider. *  Please update this list whenever your medications are discontinued, doses are 
    changed, or new medications (including over-the-counter products) are added. *  Please carry medication information at all times in case of emergency situations. Total Knee Replacement: What to Expect at Home Your Recovery When you leave the hospital, you should be able to move around with a walker or crutches. But you will need someone to help you at home for the next few weeks or until you have more energy and can move around better. If there is no one to help you at home, you may go to a rehabilitation center. You will go home with a bandage and stitches or staples. Change the bandage as your doctor tells you to. Your doctor will remove your stitches or staples 10 to 21 days after your surgery. You may still have some mild pain, and the area may be swollen for 3 to 6 months after surgery. Your knee will continue to improve for 6 to 12 months. You will probably use a walker for 1 to 3 weeks and then use crutches. When you are ready, you can use a cane. You will probably be able to walk on your own in 4 to 8 weeks. You will need to do months of physical rehabilitation (rehab) after a knee replacement.  Rehab will help you strengthen the muscles of the knee and help you regain movement. After you recover, your artificial knee will allow you to do normal daily activities with less pain or no pain at all. You may be able to hike, dance, ride a bike, and play golf. Talk to your doctor about whether you can do more strenuous activities. Always tell your caregivers that you have an artificial knee. How long it will take to walk on your own, return to normal activities, and go back to work depends on your health and how well your rehabilitation (rehab) program goes. The better you do with your rehab exercises, the quicker you will get your strength and movement back. This care sheet gives you a general idea about how long it will take for you to recover. But each person recovers at a different pace. Follow the steps below to get better as quickly as possible. How can you care for yourself at home? Activity · Rest when you feel tired. You may take a nap, but do not stay in bed all day. When you sit, use a chair with arms. You can use the arms to help you stand up. · Work with your physical therapist to find the best way to exercise. You may be able to take frequent, short walks using crutches or a walker. What you can do as your knee heals will depend on whether your new knee is cemented or uncemented. You may not be able to do certain things for a while if your new knee is uncemented. · After your knee has healed enough, you can do more strenuous activities with caution. ¨ You can golf, but use a golf cart, and do not wear shoes with spikes. ¨ You can bike on a flat road or on a stationary bike. Avoid biking up hills. ¨ Your doctor may suggest that you stay away from activities that put stress on your knee. These include tennis or badminton, squash or racquetball, contact sports like football, jumping (such as in basketball), jogging, or running. ¨ Avoid activities where you might fall. These include horseback riding, skiing, and mountain biking. · Do not sit for more than 1 hour at a time. Get up and walk around for a while before you sit again. If you must sit for a long time, prop up your leg with a chair or footstool. This will help you avoid swelling. · Ask your doctor when you can shower. You may need to take sponge baths until your stitches or staples have been removed. · Ask your doctor when you can drive again. It may take up to 8 weeks after knee replacement surgery before it is safe for you to drive. · When you get into a car, sit on the edge of the seat. Then pull in your legs, and turn to face the front. · You should be able to do many everyday activities 3 to 6 weeks after your surgery. You will probably need to take 4 to 16 weeks off from work. When you can go back to work depends on the type of work you do and how you feel. · Ask your doctor when it is okay for you to have sex. · Do not lift anything heavier than 10 pounds and do not lift weights for 12 weeks. Diet · By the time you leave the hospital, you should be eating your normal diet. If your stomach is upset, try bland, low-fat foods like plain rice, broiled chicken, toast, and yogurt. Your doctor may suggest that you take iron and vitamin supplements. · Drink plenty of fluids (unless your doctor tells you not to). · Eat healthy foods, and watch your portion sizes. Try to stay at your ideal weight. Too much weight puts more stress on your new knee. · You may notice that your bowel movements are not regular right after your surgery. This is common. Try to avoid constipation and straining with bowel movements. You may want to take a fiber supplement every day. If you have not had a bowel movement after a couple of days, ask your doctor about taking a mild laxative. Medicines · Your doctor will tell you if and when you can restart your medicines. He or she will also give you instructions about taking any new medicines. · If you take blood thinners, such as warfarin (Coumadin), clopidogrel (Plavix), or aspirin, be sure to talk to your doctor. He or she will tell you if and when to start taking those medicines again. Make sure that you understand exactly what your doctor wants you to do. · Your doctor may give you a blood-thinning medicine to prevent blood clots. If you take a blood thinner, be sure you get instructions about how to take your medicine safely. Blood thinners can cause serious bleeding problems. This medicine could be in pill form or as a shot (injection). If a shot is necessary, your doctor will tell you how to do this. · Be safe with medicines. Take pain medicines exactly as directed. ¨ If the doctor gave you a prescription medicine for pain, take it as prescribed. ¨ If you are not taking a prescription pain medicine, ask your doctor if you can take an over-the-counter medicine. ¨ Plan to take your pain medicine 30 minutes before exercises. It is easier to prevent pain before it starts than to stop it once it has started. · If you think your pain medicine is making you sick to your stomach: 
¨ Take your medicine after meals (unless your doctor has told you not to). ¨ Ask your doctor for a different pain medicine. · If your doctor prescribed antibiotics, take them as directed. Do not stop taking them just because you feel better. You need to take the full course of antibiotics. Incision care · You will have a bandage over the cut (incision) and staples or stitches. Take the bandage off when your doctor says it is okay. · Your doctor will remove the staples or stitches 10 days to 3 weeks after the surgery and replace them with strips of tape. Leave the tape on for a week or until it falls off. Exercise · Your rehab program will give you a number of exercises to do to help you get back your knee's range of motion and strength. Always do them as your therapist tells you. Ice and elevation · For pain and swelling, put ice or a cold pack on the area for 10 to 20 minutes at a time. Put a thin cloth between the ice and your skin. Other instructions · Continue to wear your support stockings as your doctor says. These help to prevent blood clots. The length of time that you will have to wear them depends on your activity level and the amount of swelling. · Wear medical alert jewelry that says you may need antibiotics before any procedure, including dental work. You can buy this at most drugsGoldenGate Software. · You have metal pieces in your knee. These may set off some airport metal detectors. Carry a medical alert card that says you have an artificial joint, just in case. Follow-up care is a key part of your treatment and safety. Be sure to make and go to all appointments, and call your doctor if you are having problems. It's also a good idea to know your test results and keep a list of the medicines you take. When should you call for help? Call 911 anytime you think you may need emergency care. For example, call if: 
· You passed out (lost consciousness). · You have severe trouble breathing. · You have sudden chest pain and shortness of breath, or you cough up blood. Call your doctor now or seek immediate medical care if: 
· You have signs of infection, such as: 
¨ Increased pain, swelling, warmth, or redness. ¨ Red streaks leading from the incision. ¨ Pus draining from the incision. ¨ A fever. · You have signs of a blood clot, such as: 
¨ Pain in your calf, back of the knee, thigh, or groin. ¨ Redness and swelling in your leg or groin. · Your incision comes open and begins to bleed, or the bleeding increases. · You have pain that does not get better after you take pain medicine. Watch closely for changes in your health, and be sure to contact your doctor if: 
· You do not have a bowel movement after taking a laxative. Where can you learn more? Go to http://lisa-monica.info/. Enter J857 in the search box to learn more about \"Total Knee Replacement: What to Expect at Home. \" Current as of: March 21, 2017 Content Version: 11.3 © 1009-8026 [employer]. Care instructions adapted under license by [employer] (which disclaims liability or warranty for this information). If you have questions about a medical condition or this instruction, always ask your healthcare professional. [place] [address] any warranty or liability for your use of this information. These are general instructions for a healthy lifestyle: No smoking/ No tobacco products/ Avoid exposure to second hand smoke Surgeon General's Warning:  Quitting smoking now greatly reduces serious risk to your health. Obesity, smoking, and sedentary lifestyle greatly increases your risk for illness A healthy diet, regular physical exercise & weight monitoring are important for maintaining a healthy lifestyle You may be retaining fluid if you have a history of heart failure or if you experience any of the following symptoms:  Weight gain of 3 pounds or more overnight or 5 pounds in a week, increased swelling in our hands or feet or shortness of breath while lying flat in bed. Please call your doctor as soon as you notice any of these symptoms; do not wait until your next office visit. Recognize signs and symptoms of STROKE: 
 
F-face looks uneven A-arms unable to move or move even S-speech slurred or non-existent T-time-call 911 as soon as signs and symptoms begin-DO NOT go Back to bed or wait to see if you get better-TIME IS BRAIN. The discharge information has been reviewed with the patient. The patient verbalized understanding. Information obtained by : 
I understand that if any problems occur once I am at home I am to contact my physician. I understand and acknowledge receipt of the instructions indicated above. Physician's or R.N.'s Signature                                                                  Date/Time Patient or Representative Signature                                                          Date/Time Discharge Orders None Introducing [facility]! Dear Jonathan Oliveira: Thank you for requesting a [facility] account. Our records indicate that you already have an active [employer] [facility] account. You can access your account anytime at [email or web address] Did you know that you can access your hospital and ER discharge instructions at any time in [employer] [facility]? You can also review all of your test results from your hospital stay or ER visit. Additional Information If you have questions, please visit the Frequently Asked Questions section of the [employer] website at [email or web address] Remember, [employer] [facility] is NOT to be used for urgent needs. For medical emergencies, dial 911. Now available from your iPhone and Android! General Information Please provide this summary of care documentation to your next provider. Patient Signature:  ____________________________________________________________ Date:  ____________________________________________________________  
  
[facility] Provider Signature:  ____________________________________________________________ Date:  ____________________________________________________________

## 2017-10-09 NOTE — PROGRESS NOTES
Problem: Self Care Deficits Care Plan (Adult)  Goal: *Acute Goals and Plan of Care (Insert Text)  GOALS:   DISCHARGE GOALS (in preparation for going home/rehab): 3 days  1. Mr. Macario Barber will perform one lower body dressing activity with minimal assistance required to demonstrate improved functional mobility and safety. 2. Mr. Macario Barber will perform one lower body bathing activity with minimal assistance required to demonstrate improved functional mobility and safety. 3. Mr. Macario Barber will perform toileting/toilet transfer with contact guard assistance to demonstrate improved functional mobility and safety. 4. Mr. Macario Barber will perform shower transfer with contact guard assistance to demonstrate improved functional mobility and safety. JOINT CAMP OCCUPATIONAL THERAPY TKA: Initial Assessment and PM 10/9/2017  INPATIENT: Hospital Day: 1  Payor: [employer] / Plan: [place] [employer] [place] / Product Type: PPO /      NAME/AGE/GENDER: Zurdo Turner is a 45 y.o. male      PRIMARY DIAGNOSIS:  Primary osteoarthritis of left knee [M17.12]              Procedure(s) and Anesthesia Type:     * LEFT KNEE ARTHROPLASTY TOTAL/ WESLY - Spinal (Left)  ICD-10: Treatment Diagnosis:        · Pain in Left Knee (M25.562)  · Stiffness of Left Knee, Not elsewhere classified (I28.263)  · Other lack of cordination (R27.8)       ASSESSMENT:      Mr. Macario Barber is s/p left TKA and presents with decreased weight bearing on left LE and decreased independence with functional mobility and activities of daily living. Patient would benefit from skilled Occupational Therapy to maximize independence and safety with self-care task and functional mobility. Pt would also benefit from education on adaptive equipment and safety precautions in preparation for going home or for recommendations for post-hospital rehab program.  Patient plans for further rehab at home with home health services and good family support .   OT reviewed therapy schedule and plan of care with patient. Patient was able to transfer and preform self care skills as charted below. Patient instructed to call for assistance when needing to get up from the bed and all needs in reach. Patient verbalized understanding of call light. This section established at most recent assessment   PROBLEM LIST (Impairments causing functional limitations):  1. Decreased Strength  2. Decreased ADL/Functional Activities  3. Decreased Transfer Abilities  4. Increased Pain  5. Increased Fatigue  6. Decreased Flexibility/Joint Mobility  7. Decreased Knowledge of Precautions    INTERVENTIONS PLANNED: (Benefits and precautions of occupational therapy have been discussed with the patient.)  1. Activities of daily living training  2. Adaptive equipment training  3. Balance training  4. Clothing management  5. Donning&doffing training  6. Theraputic activity      TREATMENT PLAN: Frequency/Duration: Follow patient 1 time to address above goals. Rehabilitation Potential For Stated Goals: EXCELLENT      RECOMMENDED REHABILITATION/EQUIPMENT: (at time of discharge pending progress): Continue Skilled Therapy and Home Health: Physical Therapy. OCCUPATIONAL PROFILE AND HISTORY:   History of Present Injury/Illness (Reason for Referral): Pt presents this date s/p (left) TKA. Past Medical History/Comorbidities:   Mr. Zachery Albarado  has a past medical history of Adverse effect of anesthesia; BMI 31.0-31.9,adult; Chronic pain; DDD (degenerative disc disease), cervical (07/20/2016); GERD (gastroesophageal reflux disease); History of kidney stones; Hypertension; Nausea & vomiting; OA (osteoarthritis); PUD (peptic ulcer disease); Sleep apnea; Thromboembolus ([address] (2010); Thromboembolus ([address] (2007); and Vertigo. He also has no past medical history of Aneurysm ([address] Arrhythmia; Asthma; Autoimmune disease ([address] CAD (coronary artery disease); Cancer ([address]  Chronic kidney disease; Chronic obstructive pulmonary disease ([facility] Coagulation disorder ([facility] Diabetes ([facility] [address] Difficult intubation; Endocarditis; Heart failure ([facility] [address] [facility] Ill-defined condition; Liver disease; Malignant hyperthermia due to anesthesia; Nicotine vapor product user; Non-nicotine vapor product user; Other ill-defined conditions(799.89); Pseudocholinesterase deficiency; Psychiatric disorder; Rheumatic fever; Seizures ([facility] [address] [facility] Stroke [facility]); Thyroid disease; or Unspecified adverse effect of anesthesia. Mr. Debbie Stephen  has a past surgical history that includes appendectomy (as teen ); lap cholecystectomy (2011); knee arthroscopy (Left); cervical fusion (1998); lumbar laminectomy (1995); cervical diskectomy (2015); tonsillectomy (1993); hernia repair (Bilateral, 2016); septoplasty (2014); hernia repair; lithotripsy; and lysis of adhesions (2012).   Social History/Living Environment:   Home Environment: Private residence  # Steps to Enter: 1  Rails to Enter: No  One/Two Story Residence: Two story  # of Interior Steps: 11  Interior Rails: Left  Living Alone: No  Support Systems: Spouse/Significant Other/Partner  Patient Expects to be Discharged to[de-identified] Private residence  Current DME Used/Available at Home: None  Tub or Shower Type: Tub/Shower combination  Prior Level of Function/Work/Activity:  Indep with BADLs and iADLs      Number of Personal Factors/Comorbidities that affect the Plan of Care: Brief history (0):  LOW COMPLEXITY   ASSESSMENT OF OCCUPATIONAL PERFORMANCE[de-identified]   Most Recent Physical Functioning:            Patient Vitals for the past 6 hrs:       BP BP Patient Position SpO2 O2 Flow Rate (L/min) Pulse   10/09/17 1039 119/66 At rest 95 % 2 l/min 63   10/09/17 1044 125/72 At rest 98 % 2 l/min (!) 59   10/09/17 1049 127/77 At rest 98 % 2 l/min 63   10/09/17 1054 137/83 At rest 98 % 2 l/min (!) 57   10/09/17 1109 140/86 At rest 99 % 2 l/min (!) 53   10/09/17 1124 139/85 At rest 100 % 2 l/min (!) 56   10/09/17 1139 149/88 At rest 100 % 2 l/min (!) 51   10/09/17 1207 (!) 164/94 At rest 100 % - (!) 50   10/09/17 1310 - - 100 % 2 l/min -   10/09/17 1403 - - 98 % - -                       Coordination  Fine Motor Skills-Upper: Left Intact; Right Intact  Gross Motor Skills-Upper: Left Intact; Right Intact           Mental Status  Neurologic State: Alert  Orientation Level: Oriented X4  Cognition: Appropriate decision making; Appropriate for age attention/concentration; Appropriate safety awareness; Follows commands  Perception: Appears intact  Perseveration: No perseveration noted  Safety/Judgement: Awareness of environment; Fall prevention                    Basic ADLs (From Assessment) Complex ADLs (From Assessment)   Basic ADL  Feeding: Supervision  Oral Facial Hygiene/Grooming: Supervision  Bathing: Minimum assistance  Upper Body Dressing: Minimum assistance  Lower Body Dressing: Moderate assistance  Toileting: Total assistance     Grooming/Bathing/Dressing Activities of Daily Living     Cognitive Retraining  Safety/Judgement: Awareness of environment; Fall prevention                 Functional Transfers  Toilet Transfer : Minimum assistance  Shower Transfer: Minimum assistance     Bed/Mat Mobility  Supine to Sit: Contact guard assistance  Sit to Supine: Contact guard assistance  Sit to Stand: Minimum assistance           Physical Skills Involved:  1. Range of Motion  2. Balance  3. Strength Cognitive Skills Affected (resulting in the inability to perform in a timely and safe manner):  1. None Psychosocial Skills Affected:  1. None   Number of elements that affect the Plan of Care: 1-3:  LOW COMPLEXITY   CLINICAL DECISION MAKIN [facility] [address] [id number] AM-PAC 6 Clicks   Daily Activity Inpatient Short Form  How much help from another person does the patient currently need. .. Total A Lot A Little None   1. Putting on and taking off regular lower body clothing?   [ ] 1   [X] 2   [ ] 3   [ ] 4   2. Bathing (including washing, rinsing, drying)? [ ] 1   [ ] 2   [X] 3   [ ] 4   3.   Toileting, which includes using toilet, bedpan or urinal?   [ ] 1   [X] 2   [ ] 3   [ ] 4   4. Putting on and taking off regular upper body clothing?   [ ] 1   [ ] 2   [X] 3   [ ] 4   5. Taking care of personal grooming such as brushing teeth? [ ] 1   [ ] 2   [X] 3   [ ] 4   6. Eating meals? [ ] 1   [ ] 2   [X] 3   [ ] 4   © 2007, Trustees of [address] [place] [address] [address] [address], under license to [employer]. All rights reserved   Score:  Initial: 16 Most Recent: X (Date: -- )     Interpretation of Tool:  Represents activities that are increasingly more difficult (i.e. Bed mobility, Transfers, Gait). Score 24 23 22-20 19-15 14-10 9-7 6       Modifier CH CI CJ CK CL CM CN         · Self Care:               - CURRENT STATUS:    CK - 40%-59% impaired, limited or restricted               - GOAL STATUS:           CJ - 20%-39% impaired, limited or restricted               - D/C STATUS:                       ---------------To be determined---------------  Payor: [employer] / [place]: [place] [employer] [facility] / Product Type: PPO /       Medical Necessity:     · Patient is expected to demonstrate progress in balance, coordination and functional technique to decrease assistance required with self care and functional mobility. Reason for Services/Other Comments:  · Patient continues to require skilled intervention due to decreased self care and functional mobility. Use of outcome tool(s) and clinical judgement create a POC that gives a: LOW COMPLEXITY                 TREATMENT:   (In addition to Assessment/Re-Assessment sessions the following treatments were rendered)      Pre-treatment Symptoms/Complaints:  none  Pain: Initial:   Pain Intensity 1: 0  Post Session:        Assessment/Reassessment only, no treatment provided today     Treatment/Session Assessment:         Response to Treatment:  Tolerated well.      Education:  [ ] Home Exercises  [X] Fall Precautions  [ ] Hip Precautions [ ] Going Home Video  [X] Knee/Hip Prosthesis Review  [X] Walker Management/Safety [X] Adaptive Equipment as Needed         Interdisciplinary Collaboration:   · Physical Therapist  · Occupational Therapist  · Registered Nurse     After treatment position/precautions:   · Supine in bed  · Bed/Chair-wheels locked  · Bed in low position  · Caregiver at bedside  · Call light within reach  · RN notified  · Family at bedside  · Side rails x 3     Compliance with Program/Exercises: compliant all of the time. Recommendations/Intent for next treatment session:  Treatment next visit will focus on increasing Mr. Abner Erazo independence with bed mobility, transfers, self care, functional mobility, modalities for pain, and patient education.        Total Treatment Duration:  OT Patient Time In/Time Out  Time In: 4670  Time Out: Safia Maya

## 2017-10-09 NOTE — ANESTHESIA PREPROCEDURE EVALUATION
Anesthetic History     PONV          Review of Systems / Medical History  Patient summary reviewed, nursing notes reviewed and pertinent labs reviewed    Pulmonary        Sleep apnea: CPAP        Comments: In w/u to get CPAP   Neuro/Psych   Within defined limits          Comments: Cervical and lumbar disc disease Cardiovascular    Hypertension: well controlled              Exercise tolerance: >4 METS  Comments: Denies CP, SOB or changes in functional status   GI/Hepatic/Renal     GERD: well controlled      PUD     Endo/Other        Obesity and arthritis     Other Findings   Comments: Hx/o DVT           Physical Exam    Airway  Mallampati: II  TM Distance: 4 - 6 cm  Neck ROM: decreased range of motion   Mouth opening: Normal    Comments: H/O multiple neck surgeries Cardiovascular    Rhythm: regular  Rate: normal         Dental    Dentition: Caps/crowns     Pulmonary  Breath sounds clear to auscultation               Abdominal  GI exam deferred       Other Findings            Anesthetic Plan    ASA: 2  Anesthesia type: spinal      Post-op pain plan if not by surgeon: peripheral nerve block single    Induction: Intravenous  Anesthetic plan and risks discussed with: Patient      CPAP in OR.

## 2017-10-09 NOTE — ANESTHESIA PROCEDURE NOTES
Peripheral Block    Start time: 10/9/2017 8:39 AM  End time: 10/9/2017 8:42 AM  Performed by: Peyton Marques  Authorized by: Peyton Marques       Pre-procedure: Indications: at surgeon's request and post-op pain management    Preanesthetic Checklist: patient identified, risks and benefits discussed, site marked, timeout performed, anesthesia consent given and patient being monitored    Timeout Time: 08:39          Block Type:   Block Type:   Adductor canal  Laterality:  Left  Monitoring:  Standard ASA monitoring, responsive to questions, continuous pulse ox, oxygen, frequent vital sign checks and heart rate  Injection Technique:  Single shot  Procedures: ultrasound guided and nerve stimulator    Patient Position: supine  Prep: chlorhexidine    Location:  Mid thigh  Needle Type:  Stimuplex  Needle Gauge:  22 G  Needle Localization:  Ultrasound guidance  Medication Injected:  0.5%  ropivacaine  Volume (mL):  20    Assessment:  Number of attempts:  1  Injection Assessment:  Incremental injection every 5 mL, negative aspiration for CSF, ultrasound image on chart, no paresthesia, local visualized surrounding nerve on ultrasound, negative aspiration for blood and no intravascular symptoms  Patient tolerance:  Patient tolerated the procedure well with no immediate complications

## 2017-10-09 NOTE — H&P
[id number] [facility]  Pre Operative History and Physical Exam    Patient ID:  Zari Manrique  [id number]  5 y.o.  1965    Today: October 9, 2017          CC:  Left  Knee pain    HPI:   The patient has end stage arthritis of the left knee. The patient was evaluated and examined during consultation prior to today. The patient complains of knee pain with activities, reports pain as mostly occurring along the joint lines, reports stiffness of the knee after inactivity, and swelling/pain at the end of the day and after increased physical activity. The pain affects the patients activities of daily living and quality of life. The patient has attempted and exhausted conservative treatment. There have been no changes to the patient's orthopedic condition since the last office visit.     Past Medical History:  Past Medical History:   Diagnosis Date    Adverse effect of anesthesia     after hernia surgery in 2016 here at [facility] \"I had trouble breathing and I blacked out\"    BMI 31.0-31.9,adult     Chronic pain     DDD (degenerative disc disease), cervical 07/20/2016    several surgeries to correct    GERD (gastroesophageal reflux disease)     seldom     History of kidney stones     numerous, lithotripsy x 1    Hypertension     controlled with meds    Nausea & vomiting     sometimes after anesthesia    OA (osteoarthritis)     PUD (peptic ulcer disease)     no recent episodes    Sleep apnea     c-pap    Thromboembolus ([address] 2010    left knee s/p knee arth    Thromboembolus ([address] 2007    s/p lithotripsy-     Vertigo        Past Surgical History:  Past Surgical History:   Procedure Laterality Date    HX APPENDECTOMY  as teen     HX CERVICAL DISKECTOMY  2015    HX CERVICAL FUSION  1998    x 3 fusion    HX HERNIA REPAIR Bilateral 2016    HX HERNIA REPAIR      umbilical     HX KNEE ARTHROSCOPY Left     left knee x 2    HX LAP CHOLECYSTECTOMY  2011    HX LITHOTRIPSY      HX LUMBAR LAMINECTOMY  1995    lumbar laminectomy    HX LYSIS OF ADHESIONS  2012    HX SEPTOPLASTY  2014    HX TONSILLECTOMY  1993        Medications:     Prior to Admission medications    Medication Sig Start Date End Date Taking? Authorizing Provider   HYDROcodone-homatropine (HYCODAN) 5-1.5 mg/5 mL (5 mL) syrup Take 5 mL by mouth four (4) times daily. Max Daily Amount: 20 mL. 9/27/17   Martha Pritchard MD   diclofenac EC (VOLTAREN) 75 mg EC tablet Take 75 mg by mouth two (2) times a day. Indications: OSTEOARTHRITIS    Historical Provider   traMADol (ULTRAM) 50 mg tablet Take 50 mg by mouth every six (6) hours as needed for Pain. Take / use AM day of surgery  per anesthesia protocols if needed   Indications: Pain    Historical Provider   metoprolol succinate (TOPROL-XL) 50 mg XL tablet Take 1 Tab by mouth daily. 9/12/17   Martha Pritchard MD   fluticasone Aida Castro) 50 mcg/actuation nasal spray 2 sprays to each nostril QD 8/29/17   Martha Pritchard MD   lisinopril-hydroCHLOROthiazide (PRINZIDE, ZESTORETIC) 20-25 mg per tablet Take 1 Tab by mouth daily. 8/29/17   Martha Pritchard MD   potassium chloride SR (KLOR-CON 10) 10 mEq tablet Take 1 Tab by mouth daily. 8/29/17   Martha Pritchard MD   MOTION SICKNESS RELIEF,MECLIZ, 25 mg chewable tablet TAKE 1 TABLET BY MOUTH EVERY 4 HOURS AS NEEDED FOR VERTIGO OR NAUSEA 12/28/16   Historical Provider   cpap machine kit by Does Not Apply route. 12 cm    Historical Provider   aspirin delayed-release 81 mg tablet Take 81 mg by mouth daily. Take / use AM day of surgery  per anesthesia protocols.     Historical Provider       Family History:     Family History   Problem Relation Age of Onset    Heart Disease Mother      CHF    Hypertension Mother     Cancer Father      lung    Hypertension Father     Hypertension Sister     Diabetes Brother     Stroke Brother     Hypertension Brother        Social History:      Social History   Substance Use Topics    Smoking status: Never Smoker    Smokeless tobacco: Never Used    Alcohol use 0.0 oz/week     1 - 2 Cans of beer per week      Comment: about 4 drinks every 2 weeks         Allergies: Allergies   Allergen Reactions    Codeine Itching        Vitals: There were no vitals taken for this visit. Objective:         General: No Acute distress                   HEENT: Normocephalic/atramatic                   Lungs:  Breathing non-labored                   Heart:   RRR                    Abdomen: soft       Extremities:  Pain with ROM of the left knee. Trace effusion. Crepitus present. Distally the patient shows no neurologic deficit. Antalgic gait appreciated.      Meds:   Current Facility-Administered Medications   Medication Dose Route Frequency    tuberculin injection 5 Units  5 Units IntraDERMal ONCE    lidocaine (XYLOCAINE) 10 mg/mL (1 %) injection 0.1 mL  0.1 mL SubCUTAneous PRN    lactated Ringers infusion  100 mL/hr IntraVENous CONTINUOUS    sodium chloride (NS) flush 5-10 mL  5-10 mL IntraVENous Q8H    sodium chloride (NS) flush 5-10 mL  5-10 mL IntraVENous PRN    acetaminophen (TYLENOL) tablet 1,000 mg  1,000 mg Oral ONCE    celecoxib (CELEBREX) capsule 200 mg  200 mg Oral ONCE    fentaNYL citrate (PF) injection 100 mcg  100 mcg IntraVENous ONCE    midazolam (VERSED) injection 2 mg  2 mg IntraVENous ONCE    ceFAZolin in 0.9% NS (ANCEF) IVPB soln 2 g  2 g IntraVENous ONCE       Patient Active Problem List   Diagnosis Code    HNP (herniated nucleus pulposus) with myelopathy, cervical M50.00    Kidney stone N20.0    Deep vein thrombosis (DVT) of popliteal vein (HCC) I82.439    DDD (degenerative disc disease), cervical M50.30    Left inguinal hernia K40.90    MARILYN (obstructive sleep apnea) G47.33    Hypersomnia G47.10    Neck pain M54.2    Bilateral inguinal hernia without obstruction or gangrene K40.20    S/P hernia repair Z98.890, Z87.19    Cervical radiculopathy M54.12    Osteoarthritis of cervical spine M47.812    OA (osteoarthritis) of knee M17.10       Assessment:   1. Arthritis of the Left knee    Plan:   The patient has failed previous conservative treatment for this condition including anti-inflammatories, injections and lifestyle modifications. The necessity for joint replacement is present. Risks, benefits, alternatives and possible complications of left knee arthroplasty have been discussed with the patient including but not limited to potential for infection, bleeding, damage to nerves and/or blood vessels, stiffness of the knee after surgery, knee instability after surgery, patellar maltracking, potential for fracture both intra-op and post-op, polyethylene wear, implant loosening, and risk for revision surgery secondary to but not limited to these discussed risks. Further, we have discussed potential for venous thrombo-embolism including deep vein thrombosis and pulmonary embolism despite being on prophylaxis. Additionally, we have discussed potential for continued pain after surgery and patient has voiced understanding that I can make no guarantees regarding the pain relief of outcomes after surgery. We have also previously discussed the potential of morbidity and mortality associated with, but not limited to, the actual surgical procedure, anesthesia, prior medical conditions, and/or the administration of medications perioperatively. The patient has been given the opportunity to ask questions all of which have been answered and the patient wishes to proceed. The patient will be admitted the day of surgery for left total knee replacement.         Signed By: Zofia Dubois MD  October 9, 2017

## 2017-10-09 NOTE — PERIOP NOTES
Betadine lavage:  17.5cc of betadine lot # [id number] , exp. Date : 06/19 ,  in 500cc of . 9NS Lot #[id number]  , exp. Date : 1APR2020.

## 2017-10-09 NOTE — PROGRESS NOTES
Admission Assessment Complete. Pt  Had a LTKA today. . Pt is A&Ox 3.  +2 pedal pulses. BLE pharmacologically paralyzed. . Dressing is clean, dry and intact. IVF inusing. Locke is draining straw yellow urine. Pt denies any pain or need at this time. Bed low and locked. Side rails x3. Call light with in reach. Pt verbalizes understanding of call light.

## 2017-10-10 LAB
ANION GAP SERPL CALC-SCNC: 11 MMOL/L (ref 7–16)
BUN SERPL-MCNC: 11 MG/DL (ref 6–23)
CALCIUM SERPL-MCNC: 7.8 MG/DL (ref 8.3–10.4)
CHLORIDE SERPL-SCNC: 106 MMOL/L (ref 98–107)
CO2 SERPL-SCNC: 24 MMOL/L (ref 21–32)
CREAT SERPL-MCNC: 1.08 MG/DL (ref 0.8–1.5)
GLUCOSE SERPL-MCNC: 87 MG/DL (ref 65–100)
HGB BLD-MCNC: 14.1 G/DL (ref 13.6–17.2)
POTASSIUM SERPL-SCNC: 4.7 MMOL/L (ref 3.5–5.1)
SODIUM SERPL-SCNC: 141 MMOL/L (ref 136–145)

## 2017-10-10 PROCEDURE — 97110 THERAPEUTIC EXERCISES: CPT

## 2017-10-10 PROCEDURE — 94760 N-INVAS EAR/PLS OXIMETRY 1: CPT

## 2017-10-10 PROCEDURE — 74011250637 HC RX REV CODE- 250/637: Performed by: ORTHOPAEDIC SURGERY

## 2017-10-10 PROCEDURE — 74011250636 HC RX REV CODE- 250/636: Performed by: ORTHOPAEDIC SURGERY

## 2017-10-10 PROCEDURE — 97535 SELF CARE MNGMENT TRAINING: CPT

## 2017-10-10 PROCEDURE — 65270000029 HC RM PRIVATE

## 2017-10-10 PROCEDURE — 36415 COLL VENOUS BLD VENIPUNCTURE: CPT | Performed by: ORTHOPAEDIC SURGERY

## 2017-10-10 PROCEDURE — 85018 HEMOGLOBIN: CPT | Performed by: ORTHOPAEDIC SURGERY

## 2017-10-10 PROCEDURE — 80048 BASIC METABOLIC PNL TOTAL CA: CPT | Performed by: ORTHOPAEDIC SURGERY

## 2017-10-10 PROCEDURE — 97150 GROUP THERAPEUTIC PROCEDURES: CPT

## 2017-10-10 PROCEDURE — 97116 GAIT TRAINING THERAPY: CPT

## 2017-10-10 RX ADMIN — REGULAR STRENGTH 325 MG: 325 TABLET ORAL at 20:55

## 2017-10-10 RX ADMIN — DEXAMETHASONE SODIUM PHOSPHATE 10 MG: 10 INJECTION INTRAMUSCULAR; INTRAVENOUS at 14:56

## 2017-10-10 RX ADMIN — ACETAMINOPHEN 1000 MG: 500 TABLET, FILM COATED ORAL at 05:46

## 2017-10-10 RX ADMIN — HYDROMORPHONE HYDROCHLORIDE 1 MG: 1 INJECTION, SOLUTION INTRAMUSCULAR; INTRAVENOUS; SUBCUTANEOUS at 09:06

## 2017-10-10 RX ADMIN — GABAPENTIN 100 MG: 100 CAPSULE ORAL at 17:10

## 2017-10-10 RX ADMIN — HYDROMORPHONE HYDROCHLORIDE 2 MG: 2 TABLET ORAL at 05:46

## 2017-10-10 RX ADMIN — GABAPENTIN 100 MG: 100 CAPSULE ORAL at 09:07

## 2017-10-10 RX ADMIN — ACETAMINOPHEN 1000 MG: 500 TABLET, FILM COATED ORAL at 01:26

## 2017-10-10 RX ADMIN — OXYCODONE HYDROCHLORIDE 10 MG: 10 TABLET, FILM COATED, EXTENDED RELEASE ORAL at 05:46

## 2017-10-10 RX ADMIN — CYCLOBENZAPRINE HYDROCHLORIDE 5 MG: 10 TABLET, FILM COATED ORAL at 20:55

## 2017-10-10 RX ADMIN — POTASSIUM CHLORIDE 10 MEQ: 10 TABLET, EXTENDED RELEASE ORAL at 09:07

## 2017-10-10 RX ADMIN — LISINOPRIL AND HYDROCHLOROTHIAZIDE 1 TABLET: 25; 20 TABLET ORAL at 09:00

## 2017-10-10 RX ADMIN — HYDROMORPHONE HYDROCHLORIDE 1 MG: 1 INJECTION, SOLUTION INTRAMUSCULAR; INTRAVENOUS; SUBCUTANEOUS at 17:57

## 2017-10-10 RX ADMIN — HYDROMORPHONE HYDROCHLORIDE 2 MG: 2 TABLET ORAL at 20:55

## 2017-10-10 RX ADMIN — CELECOXIB 200 MG: 200 CAPSULE ORAL at 09:00

## 2017-10-10 RX ADMIN — SENNOSIDES AND DOCUSATE SODIUM 2 TABLET: 8.6; 5 TABLET ORAL at 09:08

## 2017-10-10 RX ADMIN — METOPROLOL SUCCINATE 50 MG: 50 TABLET, EXTENDED RELEASE ORAL at 09:07

## 2017-10-10 RX ADMIN — ACETAMINOPHEN 1000 MG: 500 TABLET, FILM COATED ORAL at 17:10

## 2017-10-10 RX ADMIN — HYDROMORPHONE HYDROCHLORIDE 1 MG: 1 INJECTION, SOLUTION INTRAMUSCULAR; INTRAVENOUS; SUBCUTANEOUS at 01:26

## 2017-10-10 RX ADMIN — OXYCODONE HYDROCHLORIDE 10 MG: 10 TABLET, FILM COATED, EXTENDED RELEASE ORAL at 17:10

## 2017-10-10 RX ADMIN — CEFAZOLIN 2 G: 1 INJECTION, POWDER, FOR SOLUTION INTRAMUSCULAR; INTRAVENOUS; PARENTERAL at 01:26

## 2017-10-10 RX ADMIN — CELECOXIB 200 MG: 200 CAPSULE ORAL at 20:55

## 2017-10-10 RX ADMIN — CYCLOBENZAPRINE HYDROCHLORIDE 5 MG: 10 TABLET, FILM COATED ORAL at 15:01

## 2017-10-10 RX ADMIN — TRANEXAMIC ACID 1300 MG: 650 TABLET, FILM COATED ORAL at 09:08

## 2017-10-10 RX ADMIN — HYDROMORPHONE HYDROCHLORIDE 1 MG: 1 INJECTION, SOLUTION INTRAMUSCULAR; INTRAVENOUS; SUBCUTANEOUS at 11:18

## 2017-10-10 RX ADMIN — HYDROMORPHONE HYDROCHLORIDE 2 MG: 2 TABLET ORAL at 14:01

## 2017-10-10 RX ADMIN — ACETAMINOPHEN 1000 MG: 500 TABLET, FILM COATED ORAL at 11:18

## 2017-10-10 RX ADMIN — HYDROMORPHONE HYDROCHLORIDE 1 MG: 1 INJECTION, SOLUTION INTRAMUSCULAR; INTRAVENOUS; SUBCUTANEOUS at 14:55

## 2017-10-10 RX ADMIN — CYCLOBENZAPRINE HYDROCHLORIDE 5 MG: 10 TABLET, FILM COATED ORAL at 09:07

## 2017-10-10 RX ADMIN — REGULAR STRENGTH 325 MG: 325 TABLET ORAL at 09:07

## 2017-10-10 NOTE — PROGRESS NOTES
Problem: Self Care Deficits Care Plan (Adult)  Goal: *Acute Goals and Plan of Care (Insert Text)  GOALS:   DISCHARGE GOALS (in preparation for going home/rehab): 3 days  1. Mr. Felix Robert will perform one lower body dressing activity with minimal assistance required to demonstrate improved functional mobility and safety. GOAL MET 10/10/2017    2. Mr. Felix Robert will perform one lower body bathing activity with minimal assistance required to demonstrate improved functional mobility and safety. GOAL MET 10/10/2017    3. Mr. Felix Robert will perform toileting/toilet transfer with contact guard assistance to demonstrate improved functional mobility and safety. GOAL MET 10/10/2017    4. Mr. Felix Robert will perform shower transfer with contact guard assistance to demonstrate improved functional mobility and safety. GOAL MET 10/10/2017        JOINT CAMP OCCUPATIONAL THERAPY TKA: Daily Note and AM 10/10/2017  INPATIENT: Hospital Day: 2  Payor: Liat Felipe / Plan: [facility] / Product Type: PPO /      NAME/AGE/GENDER: Barbara Umaña is a 45 y.o. male      PRIMARY DIAGNOSIS:  Primary osteoarthritis of left knee [M17.12]              Procedure(s) and Anesthesia Type:     * LEFT KNEE ARTHROPLASTY TOTAL/ WESLY - Spinal (Left)  ICD-10: Treatment Diagnosis:        · Pain in Left Knee (M25.562)  · Stiffness of Left Knee, Not elsewhere classified (W20.289)  · Other lack of cordination (R27.8)       ASSESSMENT:    Mr. Felix Robert is s/p left TKA and presents with decreased weight bearing on left LE and decreased independence with functional mobility and activities of daily living. Patient completed shower and dressing as charter below in ADL grid and is ambulating with rolling walker and stand by assist.  Patient has met 4/4 goals and plans to return home with good family support. Family able to provide patient with appropriate level of assistance at this time.   OT reviewed safety precautions throughout session and therapy schedule for the remainder of today. Patient instructed to call for assistance when needing to get up from recliner and all needs in reach. Patient verbalized understanding of call light. This section established at most recent assessment   PROBLEM LIST (Impairments causing functional limitations):  1. Decreased Strength  2. Decreased ADL/Functional Activities  3. Decreased Transfer Abilities  4. Increased Pain  5. Increased Fatigue  6. Decreased Flexibility/Joint Mobility  7. Decreased Knowledge of Precautions    INTERVENTIONS PLANNED: (Benefits and precautions of occupational therapy have been discussed with the patient.)  1. Activities of daily living training  2. Adaptive equipment training  3. Balance training  4. Clothing management  5. Donning&doffing training  6. Theraputic activity      TREATMENT PLAN: Frequency/Duration: Follow patient 1 time to address above goals. Rehabilitation Potential For Stated Goals: EXCELLENT      RECOMMENDED REHABILITATION/EQUIPMENT: (at time of discharge pending progress): Continue Skilled Therapy and Home Health: Physical Therapy. OCCUPATIONAL PROFILE AND HISTORY:   History of Present Injury/Illness (Reason for Referral): Pt presents this date s/p (left) TKA. Past Medical History/Comorbidities:   Mr. Nancy Dobson  has a past medical history of Adverse effect of anesthesia; BMI 31.0-31.9,adult; Chronic pain; DDD (degenerative disc disease), cervical (07/20/2016); GERD (gastroesophageal reflux disease); History of kidney stones; Hypertension; Nausea & vomiting; OA (osteoarthritis); PUD (peptic ulcer disease); Sleep apnea; Thromboembolus ([address] (2010); Thromboembolus ([address] (2007); and Vertigo. He also has no past medical history of Aneurysm ([address] Arrhythmia; Asthma; Autoimmune disease ([address] CAD (coronary artery disease); Cancer ([address] Chronic kidney disease; Chronic obstructive pulmonary disease ([address] Coagulation disorder ([address] Diabetes ([address]  Difficult intubation; Endocarditis; Heart failure ([facility] Ill-defined condition; Liver disease; Malignant hyperthermia due to anesthesia; Nicotine vapor product user; Non-nicotine vapor product user; Other ill-defined conditions(799.89); Pseudocholinesterase deficiency; Psychiatric disorder; Rheumatic fever; Seizures ([facility] Stroke [facility]); Thyroid disease; or Unspecified adverse effect of anesthesia. Mr. Clay Irwin  has a past surgical history that includes appendectomy (as teen ); lap cholecystectomy (2011); knee arthroscopy (Left); cervical fusion (1998); lumbar laminectomy (1995); cervical diskectomy (2015); tonsillectomy (1993); hernia repair (Bilateral, 2016); septoplasty (2014); hernia repair; lithotripsy; and lysis of adhesions (2012). Social History/Living Environment:   Home Environment: Private residence  # Steps to Enter: 1  Rails to Enter: No  One/Two Story Residence: Two story  # of Interior Steps: [address]: Left  Living Alone: Yes  Support Systems: Spouse/Significant Other/Partner  Patient Expects to be Discharged to[de-identified] Private residence  Current DME Used/Available at Home: None  Tub or Shower Type: Tub/Shower combination  Prior Level of Function/Work/Activity:  Indep with BADLs and iADLs      Number of Personal Factors/Comorbidities that affect the Plan of Care: Brief history (0):  LOW COMPLEXITY   ASSESSMENT OF OCCUPATIONAL PERFORMANCE[de-identified]   Most Recent Physical Functioning:            Patient Vitals for the past 6 hrs:   BP SpO2 Pulse   10/10/17 0803 153/86 100 % 62   10/10/17 0914 - 95 % -                                   Mental Status  Neurologic State: Alert; Appropriate for age  Orientation Level: Appropriate for age  Cognition: Appropriate decision making; Appropriate for age attention/concentration; Appropriate safety awareness; Follows commands  Perception: Appears intact  Perseveration: No perseveration noted  Safety/Judgement: Awareness of environment; Fall prevention                    Basic ADLs (From Assessment) Complex ADLs (From Assessment)   Basic ADL  Feeding: Supervision  Oral Facial Hygiene/Grooming: Supervision  Bathing: Minimum assistance  Upper Body Dressing: Minimum assistance  Lower Body Dressing: Moderate assistance  Toileting: Total assistance     Grooming/Bathing/Dressing Activities of Daily Living   Grooming  Grooming Assistance: Supervision/set up  Washing Face: Supervision/set-up  Washing Hands: Supervision/set-up Cognitive Retraining  Safety/Judgement: Awareness of environment; Fall prevention   Upper Body Bathing  Bathing Assistance: Supervision/set-up; Stand-by assistance  Position Performed: Seated in chair;Standing  Adaptive Equipment: Grab bar; Shower chair     Lower Body Bathing  Bathing Assistance: Supervision/set-up; Stand-by assistance;Minimum assistance  Perineal  : Supervision/set-up; Stand-by assistance  Position Performed: Seated in chair;Standing  Lower Body : Supervision/set-up; Stand-by assistance;Minimum assistance (min dry left foot only)  Position Performed: Seated in chair;Standing  Adaptive Equipment: Grab bar; Shower chair     Upper Body Dressing Assistance  Dressing Assistance: Supervision/set-up  Pullover Shirt: Supervision/set-up Functional Transfers  Bathroom Mobility: Stand-by assistance  Toilet Transfer : Stand-by asssistance  Shower Transfer: Stand-by asssistance   Lower Body Dressing Assistance  Dressing Assistance: Minimum assistance  Underpants: Supervision/set-up  Pants With Elastic Waist: Supervision/set-up  Socks: Moderate assistance  Antiembolitic Stockings: Moderate assistance Bed/Mat Mobility  Supine to Sit: Contact guard assistance  Sit to Supine:  (left up in chair)  Sit to Stand: Contact guard assistance  Scooting: Contact guard assistance           Physical Skills Involved:  1. Range of Motion  2. Balance  3. Strength Cognitive Skills Affected (resulting in the inability to perform in a timely and safe manner):  1. None Psychosocial Skills Affected:  1.  None   Number of elements that affect the Plan of Care: 1-3:  LOW COMPLEXITY   CLINICAL DECISION MAKIN[address] [place] [id number] AM-PAC 6 Clicks   Daily Activity Inpatient Short Form  How much help from another person does the patient currently need. .. Total A Lot A Little None   1. Putting on and taking off regular lower body clothing?   [ ] 1   [X] 2   [ ] 3   [ ] 4   2. Bathing (including washing, rinsing, drying)? [ ] 1   [ ] 2   [X] 3   [ ] 4   3. Toileting, which includes using toilet, bedpan or urinal?   [ ] 1   [X] 2   [ ] 3   [ ] 4   4. Putting on and taking off regular upper body clothing?   [ ] 1   [ ] 2   [X] 3   [ ] 4   5. Taking care of personal grooming such as brushing teeth? [ ] 1   [ ] 2   [X] 3   [ ] 4   6. Eating meals? [ ] 1   [ ] 2   [X] 3   [ ] 4   © , Trustees of [address] [place] [address], under license to [employer]. All rights reserved   Score:  Initial: 16 Most Recent: X (Date: -- )     Interpretation of Tool:  Represents activities that are increasingly more difficult (i.e. Bed mobility, Transfers, Gait). Score 24 23 22-20 19-15 14-10 9-7 6       Modifier CH CI CJ CK CL CM CN         · Self Care:               - CURRENT STATUS:    CK - 40%-59% impaired, limited or restricted               - GOAL STATUS:           CJ - 20%-39% impaired, limited or restricted               - D/C STATUS:                       ---------------To be determined---------------  Payor: BLUE CROSS / [place] [facility] / Product Type: PPO /       Medical Necessity:     · Patient is expected to demonstrate progress in balance, coordination and functional technique to decrease assistance required with self care and functional mobility. Reason for Services/Other Comments:  · Patient continues to require skilled intervention due to decreased self care and functional mobility.    Use of outcome tool(s) and clinical judgement create a POC that gives a: LOW COMPLEXITY TREATMENT:   (In addition to Assessment/Re-Assessment sessions the following treatments were rendered)      Pre-treatment Symptoms/Complaints:  none  Pain: Initial:   Pain Intensity 1: 1  Pain Location 1: Knee  Pain Orientation 1: Left  Pain Intervention(s) 1: Shower  Post Session:  5/10 iceman in place, rest      Self Care: (24): Procedure(s) (per grid) utilized to improve and/or restore self-care/home management as related to dressing, bathing, toileting and grooming. Required minimal visual and verbal cueing to facilitate activities of daily living skills and compensatory activities. Treatment/Session Assessment:         Response to Treatment:  Tolerated well, plans to go home tomorrow     Education:  [ ] Home Exercises  [X] Fall Precautions  [ ] Hip Precautions [ ] [address] Video  [X] Knee/Hip Prosthesis Review  [X] Walker Management/Safety [X] Adaptive Equipment as Needed         Interdisciplinary Collaboration:   · Occupational Therapist and Registered Nurse     After treatment position/precautions:   · Up in chair, Bed/Chair-wheels locked, Bed in low position, Call light within reach, RN notified and Family at bedside     Compliance with Program/Exercises: compliant all of the time. Recommendations/Intent for next treatment session:  Treatment next visit will focus on increasing Mr. Ivette Mills independence with bed mobility, transfers, self care, functional mobility, modalities for pain, and patient education.        Total Treatment Duration:24 minutes  OT Patient Time In/Time Out  Time In: 0825  Time Out: [address], OT

## 2017-10-10 NOTE — PROGRESS NOTES
Problem: Mobility Impaired (Adult and Pediatric)  Goal: *Acute Goals and Plan of Care (Insert Text)  GOALS (1-4 days):  (1.)Mr. Ryan Simmons will move from supine to sit and sit to supine in bed with SUPERVISION. (2.)Mr. Ryan Simmons will transfer from bed to chair and chair to bed with STAND BY ASSIST using the least restrictive device. 10/10  (3.)Mr. Ryan Simmons will ambulate with STAND BY ASSIST for 200 feet with the least restrictive device. 10/10  (4.)Mr. Ryan Simmons will ambulate up/down 1 step with no railing with CONTACT GUARD ASSIST with walker. (5.)Mr. Ryan Simmons will increase left knee ROM to 5°-80°.  ________________________________________________________________________________________________      PHYSICAL THERAPY JOINT CAMP TKA: Daily Note and PM 10/10/2017  INPATIENT: Hospital Day: 2  Payor: BLUE CROSS / [place] [facility] / Product Type: PPO /      NAME/AGE/GENDER: Glenroy Dasilva is a 45 y.o. male      PRIMARY DIAGNOSIS:  Primary osteoarthritis of left knee [M17.12]              Procedure(s) and Anesthesia Type:     * LEFT KNEE ARTHROPLASTY TOTAL/ WESLY - Spinal (Left)  ICD-10: Treatment Diagnosis:        · Pain in Left Knee (M25.562)  · Stiffness of Left Knee, Not elsewhere classified (M25.662)  · Difficulty in walking, Not elsewhere classified (R26.2)       ASSESSMENT:      Mr. Ryan Simmons presents with impaired strength & mobility s/p left TKA. Pt also had decreased stability during out of bed activity. Pt will benefit from follow up therapy to help restore safe function prior to returning home with caregiver. He walk to the gym and back using RW with SBA and no LOB. He performs exercises in the gym without increase in pain. This section established at most recent assessment   PROBLEM LIST (Impairments causing functional limitations):  1. Decreased Strength  2. Decreased ADL/Functional Activities  3. Decreased Transfer Abilities  4. Decreased Ambulation Ability/Technique  5.  Decreased Balance  6. Increased Pain  7. Decreased Activity Tolerance  8. Decreased Flexibility/Joint Mobility  9. Decreased Alfalfa with Home Exercise Program    INTERVENTIONS PLANNED: (Benefits and precautions of physical therapy have been discussed with the patient.)  1. Bed Mobility  2. Gait Training  3. Home Exercise Program (HEP)  4. Therapeutic Exercise/Strengthening  5. Transfer Training  6. Range of Motion: active/assisted/passive  7. Therapeutic Activities  8. Group Therapy      TREATMENT PLAN: Frequency/Duration: Follow patient BID   to address above goals. Rehabilitation Potential For Stated Goals: GOOD      RECOMMENDED REHABILITATION/EQUIPMENT: (at time of discharge pending progress): Continue Skilled Therapy and Home Health: Physical Therapy. HISTORY:   History of Present Injury/Illness (Reason for Referral): The patient has end stage arthritis of the left knee. The patient was evaluated and examined during consultation prior to today. The patient complains of knee pain with activities, reports pain as mostly occurring along the joint lines, reports stiffness of the knee after inactivity, and swelling/pain at the end of the day and after increased physical activity. The pain affects the patients activities of daily living and quality of life. The patient has attempted and exhausted conservative treatment. There have been no changes to the patient's orthopedic condition since the last office visit. Past Medical History/Comorbidities:   Mr. Selena James  has a past medical history of Adverse effect of anesthesia; BMI 31.0-31.9,adult; Chronic pain; DDD (degenerative disc disease), cervical (07/20/2016); GERD (gastroesophageal reflux disease); History of kidney stones; Hypertension; Nausea & vomiting; OA (osteoarthritis); PUD (peptic ulcer disease); Sleep apnea; Thromboembolus ([address] (2010); Thromboembolus ([address] (2007); and Vertigo.  He also has no past medical history of Aneurysm ([address] Arrhythmia; Asthma; Autoimmune disease ([facility] CAD (coronary artery disease); Cancer ([facility] Chronic kidney disease; Chronic obstructive pulmonary disease ([facility] Coagulation disorder ([facility] Diabetes ([facility] Difficult intubation; Endocarditis; Heart failure ([facility] Ill-defined condition; Liver disease; Malignant hyperthermia due to anesthesia; Nicotine vapor product user; Non-nicotine vapor product user; Other ill-defined conditions(799.89); Pseudocholinesterase deficiency; Psychiatric disorder; Rheumatic fever; Seizures ([facility] Stroke [facility]); Thyroid disease; or Unspecified adverse effect of anesthesia. Mr. Mynor Hill  has a past surgical history that includes appendectomy (as teen ); lap cholecystectomy (2011); knee arthroscopy (Left); cervical fusion (1998); lumbar laminectomy (1995); cervical diskectomy (2015); tonsillectomy (1993); hernia repair (Bilateral, 2016); septoplasty (2014); hernia repair; lithotripsy; and lysis of adhesions (2012).   Social History/Living Environment:   Home Environment: Private residence  # Steps to Enter: 1  Rails to Enter: No  One/Two Story Residence: Two story  # of Interior Steps: [address]: Left  Living Alone: Yes  Support Systems: Spouse/Significant Other/Partner  Patient Expects to be Discharged to[de-identified] Private residence  Current DME Used/Available at Home: None  Tub or Shower Type: Tub/Shower combination  Prior Level of Function/Work/Activity:  Pt was independent without an assistive device prior to this [employer]   Number of Personal Factors/Comorbidities that affect the Plan of Care: 3+: HIGH COMPLEXITY   EXAMINATION:   Most Recent Physical Functioning:                   LLE PROM  L Knee Flexion: 78  L Knee Extension: 9           Bed Mobility  Supine to Sit: Contact guard assistance  Sit to Supine: Stand-by asssistance  Scooting: Contact guard assistance     Transfers  Sit to Stand: Stand-by asssistance  Stand to Sit: Stand-by asssistance                      Weight Bearing Status  Left Side Weight Bearing: As tolerated  Distance (ft): 136 Feet (ft) (x 2)  Ambulation - Level of Assistance: Stand-by asssistance  Assistive Device: Walker, rolling  Speed/Ana Laura: Shuffled; Slow  Step Length: Left shortened;Right shortened  Stance: Left decreased  Gait Abnormalities: Antalgic;Decreased step clearance         Braces/Orthotics: none     Left Knee Cold  Type: Cryocuff       Body Structures Involved:  1. Joints  2. Muscles Body Functions Affected:  1. Sensory/Pain  2. Movement Related Activities and Participation Affected:  1. General Tasks and Demands  2. Mobility   Number of elements that affect the Plan of Care: 4+: HIGH COMPLEXITY   CLINICAL PRESENTATION:   Presentation: Stable and uncomplicated: LOW COMPLEXITY   CLINICAL DECISION MAKIN[address] [place] [id number] AM-PAC 6 Clicks   Basic Mobility Inpatient Short Form  How much difficulty does the patient currently have. .. Unable A Lot A Little None   1. Turning over in bed (including adjusting bedclothes, sheets and blankets)? [ ] 1   [ ] 2   [X] 3   [ ] 4   2. Sitting down on and standing up from a chair with arms ( e.g., wheelchair, bedside commode, etc.)   [ ] 1   [ ] 2   [X] 3   [ ] 4   3. Moving from lying on back to sitting on the side of the bed? [ ] 1   [ ] 2   [X] 3   [ ] 4   How much help from another person does the patient currently need. .. Total A Lot A Little None   4. Moving to and from a bed to a chair (including a wheelchair)? [ ] 1   [ ] 2   [X] 3   [ ] 4   5. Need to walk in hospital room? [ ] 1   [ ] 2   [X] 3   [ ] 4   6. Climbing 3-5 steps with a railing? [X] 1   [ ] 2   [ ] 3   [ ] 4   © , Trustees of [address] [place] [address], under license to [employer]. All rights reserved       Score:  Initial: 16 Most Recent: X (Date: -- )     Interpretation of Tool:  Represents activities that are increasingly more difficult (i.e. Bed mobility, Transfers, Gait).        Score 24 23 22-20 19-15 14-10 9-7 6       Modifier CH CI CJ CK CL CM CN         · Mobility - Walking and Moving Around:               - CURRENT STATUS:    CK - 40%-59% impaired, limited or restricted               - GOAL STATUS:           CJ - 20%-39% impaired, limited or restricted               - D/C STATUS:                       ---------------To be determined---------------  Payor: BLUE CROSS / [place] [facility] / Product Type: PPO /       Medical Necessity:     · Patient is expected to demonstrate progress in strength, range of motion, balance, coordination and functional technique to decrease assistance required with bed mobility, transfers & gait. Reason for Services/Other Comments:  · Patient continues to require skilled intervention due to pt not independent with functional mobility. Use of outcome tool(s) and clinical judgement create a POC that gives a: Clear prediction of patient's progress: LOW COMPLEXITY                 TREATMENT:   (In addition to Assessment/Re-Assessment sessions the following treatments were rendered)      Pre-treatment Symptoms/Complaints:  none  Pain: Initial: visual scale  Pain Intensity 1: 2 (sme after therapy)  Post Session:        Gait Training (15 Minutes):  Gait training to improve and/or restore physical functioning as related to mobility. Ambulated 136 Feet (ft) (x 2) with Stand-by asssistance using a Walker, rolling and minimal   related to their knee position and motion to promote proper body alignment. Therapeutic Exercise: (45 Minutes (group therapy)):  Exercises per grid below to improve strength. Required minimal verbal cues to promote proper body alignment. Progressed range as indicated.                    Date:  10/10    Date:    Date:      ACTIVITY/EXERCISE AM PM AM PM AM PM   GROUP THERAPY  [ ]  [x ]  [ ]  [ ]  [ ]  [ ]   Ankle Pumps 15   15           Quad Sets  15  15           Gluteal Sets  15  15           Hip ABd/ADduction  15  15           Straight Leg Raises  15  15 Knee Slides 15   15           Short Arc Quads 15   15           Long Arc Quads               Chair Slides    15                           B = bilateral; AA = active assistive; A15 = active; P = passive       Treatment/Session Assessment:         Response to Treatment: He tolerated group therapy well. Education:  [ ] Home Exercises  [X] Fall Precautions  [ ] Hip Precautions [ ] D/C Instruction Review  [ ] Knee/Hip Prosthesis Review  [X] Walker Management/Safety [ ] Adaptive Equipment as Needed         Interdisciplinary Collaboration:   · Registered Nurse     After treatment position/precautions:   · Up in chair, Bed/Chair-wheels locked, Bed in low position, Caregiver at bedside, Call light within reach, RN notified and Family at bedside     Compliance with Program/Exercises: Will assess as treatment progresses. Recommendations/Intent for next treatment session:  Treatment next visit will focus on increasing Mr. Ayo Julien independence with bed mobility, transfers, gait training, strength/ROM exercises, modalities for pain, and patient education.        Total Treatment Duration:  PT Patient Time In/Time Out  Time In: 1300  Time Out: 3605 [facility]

## 2017-10-10 NOTE — PROGRESS NOTES
[address]  Face to Face Encounter    Patients Name: Barbara Umaña    YOB: 1965    Ordering Physician:  Jakub Godwin    Primary Diagnosis: Primary osteoarthritis of left knee [M17.12]  S/p left TKA    Date of Face to Face:   10-9-17                                 Face to Face Encounter findings are related to primary reason for home care:   yes. 1. I certify that the patient needs intermittent care as follows: physical therapy: gait/stair training    2. I certify that this patient is homebound, that is: 1) patient requires the use of a walker device, special transportation, or assistance of another to leave the home; or 2) patient's condition makes leaving the home medically contraindicated; and 3) patient has a normal inability to leave the home and leaving the home requires considerable and taxing effort. Patient may leave the home for infrequent and short duration for medical reasons, and occasional absences for non-medical reasons. Homebound status is due to the following functional limitations: Patient's ambulation limited secondary to severe pain and requires the use of an assistive device and the assistance of a caregiver for safe completion. Patient with strength and ROM deficits limiting ambulation endurance requiring the use of an assistive device and the assistance of a caregiver. Patient deemed temporarily homebound secondary to increased risk for infection when leaving home and going out into the community. 3. I certify that this patient is under my care and that I, or a nurse practitioner or  [id number], or clinical nurse specialist, or certified nurse midwife, working with me, had a Face-to-Face Encounter that meets the physician Face-to-Face Encounter requirements.   The following are the clinical findings from the [address] [place] [address] encounter that support the need for skilled services and is a summary of the encounter: see hospital chart        Ely Multani, TALA  10/10/2017      THE FOLLOWING TO BE COMPLETED BY THE COMMUNITY PHYSICIAN:    I concur with the findings described above from the F2F encounter that this patient is homebound and in need of a skilled service.     Certifying Physician: _____________________________________      Printed Certifying Physician Name: _____________________________________    Date: _________________

## 2017-10-10 NOTE — PROGRESS NOTES
Patient is A&Ox4. Able to verbalize needs. Resting quietly with no distress noted. Shift assessment completed. Dressing to surgical site is dry and intact. Neurovascular and peripheral vascular checks WNL. Locke draining clear yellow urine to bag. PIV infusing without difficulty. PIV dressing intact with no s/s of infection. Iceman applied to knee. Denies needs. Bed low and locked. Call light within reach. Instructed to call for assistance. Patient verbalizes understanding. Will continue to monitor.

## 2017-10-10 NOTE — PROGRESS NOTES
Shift Assessment Complete. Pt is post op day 1 from [address]. Pt is A&Ox 3.  +2 pedal pulses with purposeful movement in all four extremities. Dressing is clean, dry and intact. PIV capped. Pt denies any pain or need at this time. Bed low and locked. Side rails x3. Call light with in reach. Pt verbalizes understanding of call light.

## 2017-10-10 NOTE — PROGRESS NOTES
October 10, 2017         Post Op day: 1 Day Post-Op     Admit Date: 10/9/2017  Admit Diagnosis: Primary osteoarthritis of left knee [M17.12]        Subjective: Patient doing well. No concerns/complaints. No shortness of breath, chest pain or nausea/vomiting. Pain well controlled     Objective:   Visit Vitals    BP (!) 148/92 (BP 1 Location: Right arm, BP Patient Position: At rest;Supine)    Pulse (!) 56    Temp 97.7 °F (36.5 °C)    Resp 17    SpO2 98%    Temp (24hrs), Av.4 °F (36.3 °C), Min:96.3 °F (35.7 °C), Max:97.8 °F (36.6 °C)    Lab Results   Component Value Date/Time    HGB 14.0 10/09/2017 08:44 PM     Extremity Exam  Dressing Clean, dry   +Tibialis Anterior and Gastroc-Soleus left lower extremity  Sensation intact to light touch  Extremity perfused  TEDS/SCDS  No sign of DVT     Assessment / Plan :  WBAT LLE  PT/OT  ASA along with SCDs/TEDS for DVT prophylaxis in house. Discharge on ASA  BID  DIspo-Home with HH  Patient Active Problem List   Diagnosis Code    HNP (herniated nucleus pulposus) with myelopathy, cervical M50.00    Kidney stone N20.0    Deep vein thrombosis (DVT) of popliteal vein (HCC) I82.439    DDD (degenerative disc disease), cervical M50.30    Left inguinal hernia K40.90    MARILYN (obstructive sleep apnea) G47.33    Hypersomnia G47.10    Neck pain M54.2    Bilateral inguinal hernia without obstruction or gangrene K40.20    S/P hernia repair Z98.890, Z87.19    Cervical radiculopathy M54.12    Osteoarthritis of cervical spine M47.812    OA (osteoarthritis) of knee M17.10          Signed By: Priya Herrera MD     I have reviewed the patients controlled substance prescription history, as maintained in the [place] prescription monitoring program, so that the prescription(s) for a  controlled substance can be given.

## 2017-10-10 NOTE — PROGRESS NOTES
Problem: Mobility Impaired (Adult and Pediatric)  Goal: *Acute Goals and Plan of Care (Insert Text)  GOALS (1-4 days):  (1.)Mr. Jd Reese will move from supine to sit and sit to supine in bed with SUPERVISION. (2.)Mr. Jd Reese will transfer from bed to chair and chair to bed with STAND BY ASSIST using the least restrictive device. (3.)Mr. Jd Reese will ambulate with STAND BY ASSIST for 200 feet with the least restrictive device. (4.)Mr. Jd Reese will ambulate up/down 1 step with no railing with CONTACT GUARD ASSIST with walker. (5.)Mr. Jd Reese will increase left knee ROM to 5°-80°.  ________________________________________________________________________________________________      PHYSICAL THERAPY JOINT CAMP TKA: Daily Note and AM 10/10/2017  INPATIENT: Hospital Day: 2  Payor: Josy Weinstein / Plan: [facility] / Product Type: PPO /      NAME/AGE/GENDER: Marlene Hawkins is a 45 y.o. male      PRIMARY DIAGNOSIS:  Primary osteoarthritis of left knee [M17.12]              Procedure(s) and Anesthesia Type:     * LEFT KNEE ARTHROPLASTY TOTAL/ EWSLY - Spinal (Left)  ICD-10: Treatment Diagnosis:        · Pain in Left Knee (M25.562)  · Stiffness of Left Knee, Not elsewhere classified (M25.662)  · Difficulty in walking, Not elsewhere classified (R26.2)       ASSESSMENT:      Mr. Jd Reese presents with impaired strength & mobility s/p left TKA. Pt also had decreased stability during out of bed activity. Pt will benefit from follow up therapy to help restore safe function prior to returning home with caregiver. He was supine upon arrival.  He performs exercises in the bed without any problems. He ambulates 40 ft using RW with CGA. He will sit up for awhile and then come to group. This section established at most recent assessment   PROBLEM LIST (Impairments causing functional limitations):  1. Decreased Strength  2. Decreased ADL/Functional Activities  3. Decreased Transfer Abilities  4.  Decreased Ambulation Ability/Technique  5. Decreased Balance  6. Increased Pain  7. Decreased Activity Tolerance  8. Decreased Flexibility/Joint Mobility  9. Decreased [place] with Home Exercise Program    INTERVENTIONS PLANNED: (Benefits and precautions of physical therapy have been discussed with the patient.)  1. Bed Mobility  2. Gait Training  3. Home Exercise Program (HEP)  4. Therapeutic Exercise/Strengthening  5. Transfer Training  6. Range of Motion: active/assisted/passive  7. Therapeutic Activities  8. Group Therapy      TREATMENT PLAN: Frequency/Duration: Follow patient BID   to address above goals. Rehabilitation Potential For Stated Goals: GOOD      RECOMMENDED REHABILITATION/EQUIPMENT: (at time of discharge pending progress): Continue Skilled Therapy and Home Health: Physical Therapy. HISTORY:   History of Present Injury/Illness (Reason for Referral): The patient has end stage arthritis of the left knee. The patient was evaluated and examined during consultation prior to today. The patient complains of knee pain with activities, reports pain as mostly occurring along the joint lines, reports stiffness of the knee after inactivity, and swelling/pain at the end of the day and after increased physical activity. The pain affects the patients activities of daily living and quality of life. The patient has attempted and exhausted conservative treatment. There have been no changes to the patient's orthopedic condition since the last office visit. Past Medical History/Comorbidities:   Mr. Jase Hua  has a past medical history of Adverse effect of anesthesia; BMI 31.0-31.9,adult; Chronic pain; DDD (degenerative disc disease), cervical (07/20/2016); GERD (gastroesophageal reflux disease); History of kidney stones; Hypertension; Nausea & vomiting; OA (osteoarthritis); PUD (peptic ulcer disease); Sleep apnea; Thromboembolus ([address] (2010); Thromboembolus ([facility] (2007); and Vertigo.  He also has no past medical history of Aneurysm ([facility] Arrhythmia; Asthma; Autoimmune disease ([facility] CAD (coronary artery disease); Cancer ([facility] Chronic kidney disease; Chronic obstructive pulmonary disease ([facility] Coagulation disorder ([facility] Diabetes ([facility] Difficult intubation; Endocarditis; Heart failure ([facility] Ill-defined condition; Liver disease; Malignant hyperthermia due to anesthesia; Nicotine vapor product user; Non-nicotine vapor product user; Other ill-defined conditions(799.89); Pseudocholinesterase deficiency; Psychiatric disorder; Rheumatic fever; Seizures ([facility] Stroke [facility]); Thyroid disease; or Unspecified adverse effect of anesthesia. Mr. Nikole Arita  has a past surgical history that includes appendectomy (as teen ); lap cholecystectomy (2011); knee arthroscopy (Left); cervical fusion (1998); lumbar laminectomy (1995); cervical diskectomy (2015); tonsillectomy (1993); hernia repair (Bilateral, 2016); septoplasty (2014); hernia repair; lithotripsy; and lysis of adhesions (2012).   Social History/Living Environment:   Home Environment: Private residence  # Steps to Enter: 1  Rails to Enter: No  One/Two Story Residence: Two story  # of Interior Steps: [address]: Left  Living Alone: Yes  Support Systems: Spouse/Significant Other/Partner  Patient Expects to be Discharged to[de-identified] Private residence  Current DME Used/Available at Home: None  Tub or Shower Type: Tub/Shower combination  Prior Level of Function/Work/Activity:  Pt was independent without an assistive device prior to this [employer]   Number of Personal Factors/Comorbidities that affect the Plan of Care: 3+: HIGH COMPLEXITY   EXAMINATION:   Most Recent Physical Functioning:                               Bed Mobility  Supine to Sit: Contact guard assistance  Sit to Supine:  (left up in chair)  Scooting: Contact guard assistance     Transfers  Sit to Stand: Contact guard assistance  Stand to Sit: Contact guard assistance                      Weight Bearing Status  Left Side Weight Bearing: As tolerated  Distance (ft): 40 Feet (ft)  Ambulation - Level of Assistance: Contact guard assistance  Assistive Device: Walker, rolling  Speed/Ana Laura: Shuffled; Slow  Step Length: Left shortened;Right shortened  Stance: Left decreased  Gait Abnormalities: Antalgic;Decreased step clearance         Braces/Orthotics: none     Left Knee Cold  Type: Cryocuff       Body Structures Involved:  1. Joints  2. Muscles Body Functions Affected:  1. Sensory/Pain  2. Movement Related Activities and Participation Affected:  1. General Tasks and Demands  2. Mobility   Number of elements that affect the Plan of Care: 4+: HIGH COMPLEXITY   CLINICAL PRESENTATION:   Presentation: Stable and uncomplicated: LOW COMPLEXITY   CLINICAL DECISION MAKIN[address] [place] [address] [address] [id number] AM-PAC 6 Clicks   Basic Mobility Inpatient Short Form  How much difficulty does the patient currently have. .. Unable A Lot A Little None   1. Turning over in bed (including adjusting bedclothes, sheets and blankets)? [ ] 1   [ ] 2   [X] 3   [ ] 4   2. Sitting down on and standing up from a chair with arms ( e.g., wheelchair, bedside commode, etc.)   [ ] 1   [ ] 2   [X] 3   [ ] 4   3. Moving from lying on back to sitting on the side of the bed? [ ] 1   [ ] 2   [X] 3   [ ] 4   How much help from another person does the patient currently need. .. Total A Lot A Little None   4. Moving to and from a bed to a chair (including a wheelchair)? [ ] 1   [ ] 2   [X] 3   [ ] 4   5. Need to walk in hospital room? [ ] 1   [ ] 2   [X] 3   [ ] 4   6. Climbing 3-5 steps with a railing? [X] 1   [ ] 2   [ ] 3   [ ] 4   © , Trustees of [address] [place] [address] [address] [address], under license to [employer]. All rights reserved       Score:  Initial: 16 Most Recent: X (Date: -- )     Interpretation of Tool:  Represents activities that are increasingly more difficult (i.e. Bed mobility, Transfers, Gait).        Score 24 23 22-20 19-15 14-10 9-7 6       Modifier CH CI CJ CK CL CM CN · Mobility - Walking and Moving Around:               - CURRENT STATUS:    CK - 40%-59% impaired, limited or restricted               - GOAL STATUS:           CJ - 20%-39% impaired, limited or restricted               - D/C STATUS:                       ---------------To be determined---------------  Payor: BLUE CROSS / Plan: [place] [employer]OSS [facility] / Product Type: PPO /       Medical Necessity:     · Patient is expected to demonstrate progress in strength, range of motion, balance, coordination and functional technique to decrease assistance required with bed mobility, transfers & gait. Reason for Services/Other Comments:  · Patient continues to require skilled intervention due to pt not independent with functional mobility. Use of outcome tool(s) and clinical judgement create a POC that gives a: Clear prediction of patient's progress: LOW COMPLEXITY                 TREATMENT:   (In addition to Assessment/Re-Assessment sessions the following treatments were rendered)      Pre-treatment Symptoms/Complaints:  none  Pain: Initial: visual scale  Pain Intensity 1: 3 (4/10 just alittle worse)  Post Session:        Gait Training (15 Minutes):  Gait training to improve and/or restore physical functioning as related to mobility. Ambulated 40 Feet (ft) with Contact guard assistance using a Walker, rolling and minimal   related to their knee position and motion to promote proper body alignment. Therapeutic Exercise: (15 Minutes):  Exercises per grid below to improve strength. Required minimal verbal cues to promote proper body alignment. Progressed range as indicated.                    Date:  10/10    Date:    Date:      ACTIVITY/EXERCISE AM PM AM PM AM PM   GROUP THERAPY  [ ]  [ ]  [ ]  [ ]  [ ]  [ ]   Ankle Pumps 15              Quad Sets  15             Gluteal Sets  15             Hip ABd/ADduction  15             Straight Leg Raises  15             Knee Slides 15              Short Arc Quads 15              Long Arc Quads               Chair Slides                               B = bilateral; AA = active assistive; A15 = active; P = passive       Treatment/Session Assessment:         Response to Treatment: He tolerated session well     Education:  [ ] Home Exercises  [X] Fall Precautions  [ ] Hip Precautions [ ] D/C Instruction Review  [ ] Knee/Hip Prosthesis Review  [X] Walker Management/Safety [ ] Adaptive Equipment as Needed         Interdisciplinary Collaboration:   · Registered Nurse     After treatment position/precautions:   · Up in chair, Bed/Chair-wheels locked, Bed in low position, Caregiver at bedside, Call light within reach, RN notified and Family at bedside     Compliance with Program/Exercises: Will assess as treatment progresses. Recommendations/Intent for next treatment session:  Treatment next visit will focus on increasing Mr. Sanchez Alt independence with bed mobility, transfers, gait training, strength/ROM exercises, modalities for pain, and patient education.        Total Treatment Duration:  PT Patient Time In/Time Out  Time In: 0715  Time Out: 0006     Ewelina Souza, PTA

## 2017-10-10 NOTE — PROGRESS NOTES
Care Management Interventions  Mode of Transport at Discharge: Self  Transition of Care Consult (CM Consult): [address]: Yes  Discharge Durable Medical Equipment: No  Physical Therapy Consult: Yes  Occupational Therapy Consult: Yes  Current Support Network: Lives with Spouse  Confirm Follow Up Transport: Family  Plan discussed with Pt/Family/Caregiver: Yes  Freedom of Choice Offered: Yes  Discharge Location  Discharge Placement: Home with home health    Patient is a 45y.o. year old male admitted for Left TKA . Patient lives with His spouse and plans to return home on discharge. Order received to arrange home health. Patient without preference towards agency. Referral sent to [facility]. Patient denies any equipment needs as he has a walker and bedside commode. Will follow until discharge.   Media Othello

## 2017-10-11 VITALS
DIASTOLIC BLOOD PRESSURE: 72 MMHG | RESPIRATION RATE: 17 BRPM | HEART RATE: 66 BPM | OXYGEN SATURATION: 95 % | SYSTOLIC BLOOD PRESSURE: 148 MMHG | TEMPERATURE: 98.1 F

## 2017-10-11 LAB — HGB BLD-MCNC: 13.4 G/DL (ref 13.6–17.2)

## 2017-10-11 PROCEDURE — 97150 GROUP THERAPEUTIC PROCEDURES: CPT

## 2017-10-11 PROCEDURE — 74011250636 HC RX REV CODE- 250/636: Performed by: ORTHOPAEDIC SURGERY

## 2017-10-11 PROCEDURE — 77030012935 HC DRSG AQUACEL BMS -B

## 2017-10-11 PROCEDURE — 97116 GAIT TRAINING THERAPY: CPT

## 2017-10-11 PROCEDURE — 94760 N-INVAS EAR/PLS OXIMETRY 1: CPT

## 2017-10-11 PROCEDURE — 74011250637 HC RX REV CODE- 250/637: Performed by: ORTHOPAEDIC SURGERY

## 2017-10-11 PROCEDURE — 36415 COLL VENOUS BLD VENIPUNCTURE: CPT | Performed by: ORTHOPAEDIC SURGERY

## 2017-10-11 PROCEDURE — 85018 HEMOGLOBIN: CPT | Performed by: ORTHOPAEDIC SURGERY

## 2017-10-11 RX ADMIN — CELECOXIB 200 MG: 200 CAPSULE ORAL at 09:44

## 2017-10-11 RX ADMIN — REGULAR STRENGTH 325 MG: 325 TABLET ORAL at 09:44

## 2017-10-11 RX ADMIN — TRANEXAMIC ACID 1300 MG: 650 TABLET, FILM COATED ORAL at 09:43

## 2017-10-11 RX ADMIN — GABAPENTIN 100 MG: 100 CAPSULE ORAL at 09:43

## 2017-10-11 RX ADMIN — METOPROLOL SUCCINATE 50 MG: 50 TABLET, EXTENDED RELEASE ORAL at 09:43

## 2017-10-11 RX ADMIN — POTASSIUM CHLORIDE 10 MEQ: 10 TABLET, EXTENDED RELEASE ORAL at 09:44

## 2017-10-11 RX ADMIN — ACETAMINOPHEN 1000 MG: 500 TABLET, FILM COATED ORAL at 06:13

## 2017-10-11 RX ADMIN — LISINOPRIL AND HYDROCHLOROTHIAZIDE 1 TABLET: 25; 20 TABLET ORAL at 09:44

## 2017-10-11 RX ADMIN — SENNOSIDES AND DOCUSATE SODIUM 2 TABLET: 8.6; 5 TABLET ORAL at 09:43

## 2017-10-11 RX ADMIN — CYCLOBENZAPRINE HYDROCHLORIDE 5 MG: 10 TABLET, FILM COATED ORAL at 09:44

## 2017-10-11 RX ADMIN — OXYCODONE HYDROCHLORIDE 10 MG: 10 TABLET, FILM COATED, EXTENDED RELEASE ORAL at 06:13

## 2017-10-11 RX ADMIN — HYDROMORPHONE HYDROCHLORIDE 2 MG: 2 TABLET ORAL at 09:45

## 2017-10-11 RX ADMIN — HYDROMORPHONE HYDROCHLORIDE 1 MG: 1 INJECTION, SOLUTION INTRAMUSCULAR; INTRAVENOUS; SUBCUTANEOUS at 03:34

## 2017-10-11 RX ADMIN — HYDROMORPHONE HYDROCHLORIDE 2 MG: 2 TABLET ORAL at 01:18

## 2017-10-11 RX ADMIN — HYDROMORPHONE HYDROCHLORIDE 2 MG: 2 TABLET ORAL at 06:13

## 2017-10-11 RX ADMIN — ACETAMINOPHEN 1000 MG: 500 TABLET, FILM COATED ORAL at 01:19

## 2017-10-11 NOTE — PROGRESS NOTES
2017         Post Op day: 2 Days Post-Op     Admit Date: 10/9/2017  Admit Diagnosis: Primary osteoarthritis of left knee [M17.12]        Subjective: Patient doing well. No concerns/complaints. No shortness of breath, chest pain or nausea/vomiting. Did well with PT yesterday. Pain well controlled     Objective:   Visit Vitals    /67    Pulse (!) 58    Temp 98 °F (36.7 °C)    Resp 18    SpO2 94%    Temp (24hrs), Av.2 °F (36.8 °C), Min:97.3 °F (36.3 °C), Max:98.7 °F (37.1 °C)    Lab Results   Component Value Date/Time    HGB 13.4 10/11/2017 05:21 AM     Extremity Exam  Dressing Clean, dry   +Tibialis Anterior and Gastroc-Soleus left lower extremity  Sensation intact to light touch  Extremity perfused  TEDS/SCDS  No sign of DVT     Assessment / Plan :  WBAT LLE  PT/OT  ASA along with SCDs/TEDS for DVT prophylaxis in house. Discharge on ASA BID  DIspo-Home with HH  Patient Active Problem List   Diagnosis Code    HNP (herniated nucleus pulposus) with myelopathy, cervical M50.00    Kidney stone N20.0    Deep vein thrombosis (DVT) of popliteal vein (HCC) I82.439    DDD (degenerative disc disease), cervical M50.30    Left inguinal hernia K40.90    MARILYN (obstructive sleep apnea) G47.33    Hypersomnia G47.10    Neck pain M54.2    Bilateral inguinal hernia without obstruction or gangrene K40.20    S/P hernia repair Z98.890, Z87.19    Cervical radiculopathy M54.12    Osteoarthritis of cervical spine M47.812    OA (osteoarthritis) of knee M17.10          Signed By: Randi Agudelo MD     I have reviewed the patients controlled substance prescription history, as maintained in the [place] prescription monitoring program, so that the prescription(s) for a  controlled substance can be given.

## 2017-10-11 NOTE — DISCHARGE INSTRUCTIONS
[facility]   Patient Discharge Instructions    Glenroy Dasilva / [id number] : 1965    Admitted 10/9/2017 Discharged: 10/11/2017     IF YOU HAVE ANY PROBLEMS ONCE YOU ARE AT HOME CALL THE FOLLOWING NUMBERS:   Main office number: [phone]    Take Home Medications     · It is important that you take the medication exactly as they are prescribed. · Keep your medication in the bottles provided by the pharmacist and keep a list of the medication names, dosages, and times to be taken in your wallet. · Do not take other medications without consulting your doctor. What to do at [address] your prehospital diet. If you have excessive nausea or vomitting call your doctor's office     Home Physical Therapy is arranged. Use rolling walker when walking. Use Chano Hose stockings until we see you in the office for your follow up appointment with Dr. Cesar Robins. Patients who have had a joint replacement should not drive until you are seen for your follow up appointment by Dr. Cesar Robins. When to Call    - Call if you have a temperature greater then 101  - Unable to keep food down  - Loose control of your bladder or bowel function  - Are unable to bear any weight   - Need a pain medication refill       DISCHARGE SUMMARY from Nurse    The following personal items collected during your admission are returned to you:   Dental Appliance: Dental Appliances: None  Vision: Visual Aid: Glasses  Hearing Aid:   na  Jewelry: Jewelry: None  Clothing:   self  Other Valuables:  Other Valuables: None (denies valuables)  Valuables sent to safe:   na    PATIENT INSTRUCTIONS:    After general anesthesia or intravenous sedation, for 24 hours or while taking prescription Narcotics:  · Limit your activities  · Do not drive and operate hazardous machinery  · Do not make important personal or business decisions  · Do  not drink alcoholic beverages  · If you have not urinated within 8 hours after discharge, please contact your surgeon on call. Report the following to your surgeon:  · Excessive pain, swelling, redness or odor of or around the surgical area  · Temperature over 101  · Nausea and vomiting lasting longer than 4 hours or if unable to take medications  · Any signs of decreased circulation or nerve impairment to extremity: change in color, persistent  numbness, tingling, coldness or increase pain  · Any questions, call office @ [phone]      Keep scheduled follow up appointment. If need to change, call office @ [phone]. *  Please give a list of your current medications to your Primary Care Provider. *  Please update this list whenever your medications are discontinued, doses are      changed, or new medications (including over-the-counter products) are added. *  Please carry medication information at all times in case of emergency situations. Total Knee Replacement: What to Expect at [address] [place] [address]    When you leave the hospital, you should be able to move around with a walker or crutches. But you will need someone to help you at home for the next few weeks or until you have more energy and can move around better. If there is no one to help you at home, you may go to a rehabilitation center. You will go home with a bandage and stitches or staples. Change the bandage as your doctor tells you to. Your doctor will remove your stitches or staples 10 to 21 days after your surgery. You may still have some mild pain, and the area may be swollen for 3 to 6 months after surgery. Your knee will continue to improve for 6 to 12 months. You will probably use a walker for 1 to 3 weeks and then use crutches. When you are ready, you can use a cane. You will probably be able to walk on your own in 4 to 8 weeks. You will need to do months of physical rehabilitation (rehab) after a knee replacement. Rehab will help you strengthen the muscles of the knee and help you regain movement.  After you recover, your artificial knee will allow you to do normal daily activities with less pain or no pain at all. You may be able to hike, dance, ride a bike, and play golf. Talk to your doctor about whether you can do more strenuous activities. Always tell your caregivers that you have an artificial knee. How long it will take to walk on your own, return to normal activities, and go back to work depends on your health and how well your rehabilitation (rehab) program goes. The better you do with your rehab exercises, the quicker you will get your strength and movement back. This care sheet gives you a general idea about how long it will take for you to recover. But each person recovers at a different pace. Follow the steps below to get better as quickly as possible. How can you care for yourself at home? Activity  · Rest when you feel tired. You may take a nap, but do not stay in bed all day. When you sit, use a chair with arms. You can use the arms to help you stand up. · Work with your physical therapist to find the best way to exercise. You may be able to take frequent, short walks using crutches or a walker. What you can do as your knee heals will depend on whether your new knee is cemented or uncemented. You may not be able to do certain things for a while if your new knee is uncemented. · After your knee has healed enough, you can do more strenuous activities with caution. ¨ You can golf, but use a golf cart, and do not wear shoes with spikes. ¨ You can bike on a flat road or on a stationary bike. Avoid biking up hills. ¨ Your doctor may suggest that you stay away from activities that put stress on your knee. These include tennis or badminton, squash or racquetball, contact sports like football, jumping (such as in basketball), jogging, or running. ¨ Avoid activities where you might fall. These include horseback riding, skiing, and mountain biking. · Do not sit for more than 1 hour at a time.  Get up and walk around for a while before you sit again. If you must sit for a long time, prop up your leg with a chair or footstool. This will help you avoid swelling. · Ask your doctor when you can shower. You may need to take sponge baths until your stitches or staples have been removed. · Ask your doctor when you can drive again. It may take up to 8 weeks after knee replacement surgery before it is safe for you to drive. · When you get into a car, sit on the edge of the seat. Then pull in your legs, and turn to face the front. · You should be able to do many everyday activities 3 to 6 weeks after your surgery. You will probably need to take 4 to 16 weeks off from work. When you can go back to work depends on the type of work you do and how you feel. · Ask your doctor when it is okay for you to have sex. · Do not lift anything heavier than 10 pounds and do not lift weights for 12 weeks. Diet  · By the time you leave the hospital, you should be eating your normal diet. If your stomach is upset, try bland, low-fat foods like plain rice, broiled chicken, toast, and yogurt. Your doctor may suggest that you take iron and vitamin supplements. · Drink plenty of fluids (unless your doctor tells you not to). · Eat healthy foods, and watch your portion sizes. Try to stay at your ideal weight. Too much weight puts more stress on your new knee. · You may notice that your bowel movements are not regular right after your surgery. This is common. Try to avoid constipation and straining with bowel movements. You may want to take a fiber supplement every day. If you have not had a bowel movement after a couple of days, ask your doctor about taking a mild laxative. Medicines  · Your doctor will tell you if and when you can restart your medicines. He or she will also give you instructions about taking any new medicines. · If you take blood thinners, such as warfarin (Coumadin), clopidogrel (Plavix), or aspirin, be sure to talk to your doctor. He or she will tell you if and when to start taking those medicines again. Make sure that you understand exactly what your doctor wants you to do. · Your doctor may give you a blood-thinning medicine to prevent blood clots. If you take a blood thinner, be sure you get instructions about how to take your medicine safely. Blood thinners can cause serious bleeding problems. This medicine could be in pill form or as a shot (injection). If a shot is necessary, your doctor will tell you how to do this. · Be safe with medicines. Take pain medicines exactly as directed. ¨ If the doctor gave you a prescription medicine for pain, take it as prescribed. ¨ If you are not taking a prescription pain medicine, ask your doctor if you can take an over-the-counter medicine. ¨ Plan to take your pain medicine 30 minutes before exercises. It is easier to prevent pain before it starts than to stop it once it has started. · If you think your pain medicine is making you sick to your stomach:  ¨ Take your medicine after meals (unless your doctor has told you not to). ¨ Ask your doctor for a different pain medicine. · If your doctor prescribed antibiotics, take them as directed. Do not stop taking them just because you feel better. You need to take the full course of antibiotics. Incision care  · You will have a bandage over the cut (incision) and staples or stitches. Take the bandage off when your doctor says it is okay. · Your doctor will remove the staples or stitches 10 days to 3 weeks after the surgery and replace them with strips of tape. Leave the tape on for a week or until it falls off. Exercise  · Your rehab program will give you a number of exercises to do to help you get back your knee's range of motion and strength. Always do them as your therapist tells you. Ice and elevation  · For pain and swelling, put ice or a cold pack on the area for 10 to 20 minutes at a time.  Put a thin cloth between the ice and your skin.  Other instructions  · Continue to wear your support stockings as your doctor says. These help to prevent blood clots. The length of time that you will have to wear them depends on your activity level and the amount of swelling. · Wear medical alert jewelry that says you may need antibiotics before any procedure, including dental work. You can buy this at most drugstores. · You have metal pieces in your knee. These may set off some airport metal detectors. Carry a medical alert card that says you have an artificial joint, just in case. Follow-up care is a key part of your treatment and safety. Be sure to make and go to all appointments, and call your doctor if you are having problems. It's also a good idea to know your test results and keep a list of the medicines you take. When should you call for help? Call 911 anytime you think you may need emergency care. For example, call if:  · You passed out (lost consciousness). · You have severe trouble breathing. · You have sudden chest pain and shortness of breath, or you cough up blood. Call your doctor now or seek immediate medical care if:  · You have signs of infection, such as:  ¨ Increased pain, swelling, warmth, or redness. ¨ Red streaks leading from the incision. ¨ Pus draining from the incision. ¨ A fever. · You have signs of a blood clot, such as:  ¨ Pain in your calf, back of the knee, thigh, or groin. ¨ Redness and swelling in your leg or groin. · Your incision comes open and begins to bleed, or the bleeding increases. · You have pain that does not get better after you take pain medicine. Watch closely for changes in your health, and be sure to contact your doctor if:  · You do not have a bowel movement after taking a laxative. Where can you learn more? Go to http://lisa-monica.info/. Enter J591 in the search box to learn more about \"Total Knee Replacement: What to Expect at Home. \"  Current as of: March 21, 2017  Content Version: 11.3  © 5981-9478 [employer]. Care instructions adapted under license by [employer] (which disclaims liability or warranty for this information). If you have questions about a medical condition or this instruction, always ask your healthcare professional. [address] any warranty or liability for your use of this information. These are general instructions for a healthy lifestyle:    No smoking/ No tobacco products/ Avoid exposure to second hand smoke    Surgeon General's Warning:  Quitting smoking now greatly reduces serious risk to your health. Obesity, smoking, and sedentary lifestyle greatly increases your risk for illness    A healthy diet, regular physical exercise & weight monitoring are important for maintaining a healthy lifestyle    You may be retaining fluid if you have a history of heart failure or if you experience any of the following symptoms:  Weight gain of 3 pounds or more overnight or 5 pounds in a week, increased swelling in our hands or feet or shortness of breath while lying flat in bed. Please call your doctor as soon as you notice any of these symptoms; do not wait until your next office visit. Recognize signs and symptoms of STROKE:    F-face looks uneven    A-arms unable to move or move even    S-speech slurred or non-existent    T-time-call 911 as soon as signs and symptoms begin-DO NOT go       Back to bed or wait to see if you get better-TIME IS BRAIN. The discharge information has been reviewed with the patient. The patient verbalized understanding. Information obtained by :  I understand that if any problems occur once I am at home I am to contact my physician. I understand and acknowledge receipt of the instructions indicated above.                                                                                                                                            Physician's or R.N.'s Signature                                                                  Date/Time                                                                                                                                              Patient or Representative Signature                                                          Date/Time

## 2017-10-11 NOTE — PROGRESS NOTES
Problem: Mobility Impaired (Adult and Pediatric)  Goal: *Acute Goals and Plan of Care (Insert Text)  GOALS (1-4 days):  (1.)Mr. Jeannie Bettencourt will move from supine to sit and sit to supine in bed with SUPERVISION. 10/11  (2.)Mr. Jeannie Bettencourt will transfer from bed to chair and chair to bed with STAND BY ASSIST using the least restrictive device. 10/10  (3.)Mr. Jeannie Bettencourt will ambulate with STAND BY ASSIST for 200 feet with the least restrictive device. 10/10  (4.)Mr. Jeannie Bettencourt will ambulate up/down 1 step with no railing with CONTACT GUARD ASSIST with walker. 10/11  (5.)Mr. Jeannie Bettencourt will increase left knee ROM to 5°-80°.  ________________________________________________________________________________________________      PHYSICAL THERAPY JOINT CAMP TKA: Daily Note and PM 10/11/2017  INPATIENT: Hospital Day: 3  Payor: BLUE CROSS / Plan: [place] [employer] [place] / Product Type: PPO /      NAME/AGE/GENDER: Lidya Brumfield is a 45 y.o. male      PRIMARY DIAGNOSIS:  Primary osteoarthritis of left knee [M17.12]              Procedure(s) and Anesthesia Type:     * LEFT KNEE ARTHROPLASTY TOTAL/ WESLY - Spinal (Left)  ICD-10: Treatment Diagnosis:        · Pain in Left Knee (M25.562)  · Stiffness of Left Knee, Not elsewhere classified (M25.662)  · Difficulty in walking, Not elsewhere classified (R26.2)       ASSESSMENT:      Mr. Jeannie Bettencourt presents with impaired strength & mobility s/p left TKA. Pt also had decreased stability during out of bed activity. Pt will benefit from follow up therapy to help restore safe function prior to returning home with caregiver. He walk to the gym and back using RW with SBA and no LOB. He is ready for D/C. This section established at most recent assessment   PROBLEM LIST (Impairments causing functional limitations):  1. Decreased Strength  2. Decreased ADL/Functional Activities  3. Decreased Transfer Abilities  4. Decreased Ambulation Ability/Technique  5. Decreased Balance  6. Increased Pain  7.  Decreased Activity Tolerance  8. Decreased Flexibility/Joint Mobility  9. Decreased [place] with Home Exercise Program    INTERVENTIONS PLANNED: (Benefits and precautions of physical therapy have been discussed with the patient.)  1. Bed Mobility  2. Gait Training  3. Home Exercise Program (HEP)  4. Therapeutic Exercise/Strengthening  5. Transfer Training  6. Range of Motion: active/assisted/passive  7. Therapeutic Activities  8. Group Therapy      TREATMENT PLAN: Frequency/Duration: Follow patient BID   to address above goals. Rehabilitation Potential For Stated Goals: GOOD      RECOMMENDED REHABILITATION/EQUIPMENT: (at time of discharge pending progress): Continue Skilled Therapy and Home Health: Physical Therapy. HISTORY:   History of Present Injury/Illness (Reason for Referral): The patient has end stage arthritis of the left knee. The patient was evaluated and examined during consultation prior to today. The patient complains of knee pain with activities, reports pain as mostly occurring along the joint lines, reports stiffness of the knee after inactivity, and swelling/pain at the end of the day and after increased physical activity. The pain affects the patients activities of daily living and quality of life. The patient has attempted and exhausted conservative treatment. There have been no changes to the patient's orthopedic condition since the last office visit. Past Medical History/Comorbidities:   Mr. Bienvenido Reynoso  has a past medical history of Adverse effect of anesthesia; BMI 31.0-31.9,adult; Chronic pain; DDD (degenerative disc disease), cervical (07/20/2016); GERD (gastroesophageal reflux disease); History of kidney stones; Hypertension; Nausea & vomiting; OA (osteoarthritis); PUD (peptic ulcer disease); Sleep apnea; Thromboembolus ([address] (2010); Thromboembolus ([address] (2007); and Vertigo. He also has no past medical history of Aneurysm ([address] Arrhythmia; Asthma;  Autoimmune disease ([address] CAD (coronary artery disease); Cancer ([facility] Chronic kidney disease; Chronic obstructive pulmonary disease ([facility] Coagulation disorder ([facility] Diabetes ([facility] Difficult intubation; Endocarditis; Heart failure ([facility] Ill-defined condition; Liver disease; Malignant hyperthermia due to anesthesia; Nicotine vapor product user; Non-nicotine vapor product user; Other ill-defined conditions(799.89); Pseudocholinesterase deficiency; Psychiatric disorder; Rheumatic fever; Seizures ([facility] Stroke [facility]); Thyroid disease; or Unspecified adverse effect of anesthesia. Mr. Fred Brunson  has a past surgical history that includes appendectomy (as teen ); lap cholecystectomy (2011); knee arthroscopy (Left); cervical fusion (1998); lumbar laminectomy (1995); cervical diskectomy (2015); tonsillectomy (1993); hernia repair (Bilateral, 2016); septoplasty (2014); hernia repair; lithotripsy; and lysis of adhesions (2012). Social History/Living Environment:   Home Environment: Private residence  # Steps to Enter: 1  Rails to Enter: No  One/Two Story Residence: Two story  # of Interior Steps: [address]: Left  Living Alone: Yes  Support Systems: Spouse/Significant Other/Partner  Patient Expects to be Discharged to[de-identified] Private residence  Current DME Used/Available at Home: None  Tub or Shower Type: Tub/Shower combination  Prior Level of Function/Work/Activity:  Pt was independent without an assistive device prior to this [employer]   Number of Personal Factors/Comorbidities that affect the Plan of Care: 3+: HIGH COMPLEXITY   EXAMINATION:   Most Recent Physical Functioning:                                     Transfers  Sit to Stand: Stand-by asssistance  Stand to Sit: Stand-by asssistance                      Weight Bearing Status  Left Side Weight Bearing: As tolerated  Distance (ft): 136 Feet (ft) (x 2)  Ambulation - Level of Assistance: Stand-by asssistance  Assistive Device: Walker, rolling  Speed/Ana Laura: Shuffled; Slow  Step Length: Left shortened;Right shortened  Stance: Left decreased  Gait Abnormalities: Antalgic;Decreased step clearance  Number of Stairs Trained: 22  Stairs - Level of Assistance: Stand-by asssistance  Rail Use: Both      Braces/Orthotics: none     Left Knee Cold  Type: Cryocuff       Body Structures Involved:  1. Joints  2. Muscles Body Functions Affected:  1. Sensory/Pain  2. Movement Related Activities and Participation Affected:  1. General Tasks and Demands  2. Mobility   Number of elements that affect the Plan of Care: 4+: HIGH COMPLEXITY   CLINICAL PRESENTATION:   Presentation: Stable and uncomplicated: LOW COMPLEXITY   CLINICAL DECISION MAKIN[address] [place] [address] AM-PAC 6 Clicks   Basic Mobility Inpatient Short Form  How much difficulty does the patient currently have. .. Unable A Lot A Little None   1. Turning over in bed (including adjusting bedclothes, sheets and blankets)? [ ] 1   [ ] 2   [X] 3   [ ] 4   2. Sitting down on and standing up from a chair with arms ( e.g., wheelchair, bedside commode, etc.)   [ ] 1   [ ] 2   [X] 3   [ ] 4   3. Moving from lying on back to sitting on the side of the bed? [ ] 1   [ ] 2   [X] 3   [ ] 4   How much help from another person does the patient currently need. .. Total A Lot A Little None   4. Moving to and from a bed to a chair (including a wheelchair)? [ ] 1   [ ] 2   [X] 3   [ ] 4   5. Need to walk in hospital room? [ ] 1   [ ] 2   [X] 3   [ ] 4   6. Climbing 3-5 steps with a railing? [X] 1   [ ] 2   [ ] 3   [ ] 4   © , Trustees of [address] [place] [address], under license to [employer]. All rights reserved       Score:  Initial: 16 Most Recent: X (Date: -- )     Interpretation of Tool:  Represents activities that are increasingly more difficult (i.e. Bed mobility, Transfers, Gait).        Score 24 23 22-20 19-15 14-10 9-7 6       Modifier CH CI CJ CK CL CM CN         · Mobility - Walking and Moving Around:               - CURRENT STATUS:    CK - 40%-59% impaired, limited or restricted               - GOAL STATUS:           CJ - 20%-39% impaired, limited or restricted               - D/C STATUS:                       ---------------To be determined---------------  Payor: BLUE CROSS / [place] [employer] [facility] / Product Type: PPO /       Medical Necessity:     · Patient is expected to demonstrate progress in strength, range of motion, balance, coordination and functional technique to decrease assistance required with bed mobility, transfers & gait. Reason for Services/Other Comments:  · Patient continues to require skilled intervention due to pt not independent with functional mobility. Use of outcome tool(s) and clinical judgement create a POC that gives a: Clear prediction of patient's progress: LOW COMPLEXITY                 TREATMENT:   (In addition to Assessment/Re-Assessment sessions the following treatments were rendered)      Pre-treatment Symptoms/Complaints:  none  Pain: Initial: visual scale  Pain Intensity 1: 0 (0/10 after therapy)  Post Session:        Gait Training (15 Minutes):  Gait training to improve and/or restore physical functioning as related to mobility. Ambulated 136 Feet (ft) (x 2) with Stand-by asssistance using a Walker, rolling and minimal   related to their knee position and motion to promote proper body alignment. Therapeutic Exercise: (45 Minutes (group)):  Exercises per grid below to improve strength. Required minimal verbal cues to promote proper body alignment. Progressed range as indicated.                    Date:  10/10    Date:  10/11    Date:      ACTIVITY/EXERCISE AM PM AM PM AM PM   GROUP THERAPY  [ ]  [x ]  [x ]  [ ]  [ ]  [ ]   Ankle Pumps 15   15 20          Quad Sets  15  15  20         Gluteal Sets  15  15  20         Hip ABd/ADduction  15  15  20         Straight Leg Raises  15  15  20         Knee Slides 15   15  20         Short Arc Quads 15   15  [facility] Slides    15  20                         B = bilateral; AA = active assistive; A15 = active; P = passive       Treatment/Session Assessment:         Response to Treatment: He tolerated group therapy well. He is ready for D/C     Education:  [ ] Home Exercises  [X] Fall Precautions  [ ] Hip Precautions [ ] D/C Instruction Review  [ ] Knee/Hip Prosthesis Review  [X] Walker Management/Safety [ ] Adaptive Equipment as Needed         Interdisciplinary Collaboration:   · Registered Nurse     After treatment position/precautions:   · Up in chair, Bed/Chair-wheels locked, Bed in low position, Caregiver at bedside, Call light within reach, RN notified and Family at bedside     Compliance with Program/Exercises: Will assess as treatment progresses. Recommendations/Intent for next treatment session:  Treatment next visit will focus on increasing Mr. Jerome Murcia independence with bed mobility, transfers, gait training, strength/ROM exercises, modalities for pain, and patient education.        Total Treatment Duration:  PT Patient Time In/Time Out  Time In: 1030  Time Out: Ian 18 Wileyager, PTA

## 2017-10-12 ENCOUNTER — HOME CARE VISIT (OUTPATIENT)
Dept: SCHEDULING | Facility: HOME HEALTH | Age: 52
End: 2017-10-12
Payer: COMMERCIAL

## 2017-10-12 VITALS
SYSTOLIC BLOOD PRESSURE: 140 MMHG | HEART RATE: 67 BPM | TEMPERATURE: 97.1 F | DIASTOLIC BLOOD PRESSURE: 84 MMHG | RESPIRATION RATE: 16 BRPM

## 2017-10-12 PROCEDURE — G0151 HHCP-SERV OF PT,EA 15 MIN: HCPCS

## 2017-10-12 PROCEDURE — 400013 HH SOC

## 2017-10-16 ENCOUNTER — HOME CARE VISIT (OUTPATIENT)
Dept: SCHEDULING | Facility: HOME HEALTH | Age: 52
End: 2017-10-16
Payer: COMMERCIAL

## 2017-10-16 ENCOUNTER — HOME CARE VISIT (OUTPATIENT)
Dept: HOME HEALTH SERVICES | Facility: HOME HEALTH | Age: 52
End: 2017-10-16
Payer: COMMERCIAL

## 2017-10-16 VITALS
DIASTOLIC BLOOD PRESSURE: 72 MMHG | SYSTOLIC BLOOD PRESSURE: 140 MMHG | RESPIRATION RATE: 18 BRPM | HEART RATE: 87 BPM | TEMPERATURE: 97.7 F

## 2017-10-16 PROCEDURE — G0157 HHC PT ASSISTANT EA 15: HCPCS

## 2017-10-18 ENCOUNTER — HOME CARE VISIT (OUTPATIENT)
Dept: SCHEDULING | Facility: HOME HEALTH | Age: 52
End: 2017-10-18
Payer: COMMERCIAL

## 2017-10-18 VITALS
HEART RATE: 100 BPM | TEMPERATURE: 97.2 F | RESPIRATION RATE: 18 BRPM | SYSTOLIC BLOOD PRESSURE: 156 MMHG | DIASTOLIC BLOOD PRESSURE: 82 MMHG

## 2017-10-18 PROCEDURE — G0157 HHC PT ASSISTANT EA 15: HCPCS

## 2017-10-19 ENCOUNTER — HOME CARE VISIT (OUTPATIENT)
Dept: SCHEDULING | Facility: HOME HEALTH | Age: 52
End: 2017-10-19
Payer: COMMERCIAL

## 2017-10-19 PROCEDURE — G0151 HHCP-SERV OF PT,EA 15 MIN: HCPCS

## 2017-10-20 VITALS
SYSTOLIC BLOOD PRESSURE: 124 MMHG | TEMPERATURE: 97.3 F | HEART RATE: 78 BPM | RESPIRATION RATE: 17 BRPM | OXYGEN SATURATION: 98 % | DIASTOLIC BLOOD PRESSURE: 72 MMHG

## 2017-10-23 ENCOUNTER — HOME CARE VISIT (OUTPATIENT)
Dept: SCHEDULING | Facility: HOME HEALTH | Age: 52
End: 2017-10-23
Payer: COMMERCIAL

## 2017-10-23 VITALS
TEMPERATURE: 97.2 F | HEART RATE: 98 BPM | DIASTOLIC BLOOD PRESSURE: 68 MMHG | SYSTOLIC BLOOD PRESSURE: 140 MMHG | RESPIRATION RATE: 18 BRPM

## 2017-10-23 PROCEDURE — G0157 HHC PT ASSISTANT EA 15: HCPCS

## 2017-10-25 ENCOUNTER — HOME CARE VISIT (OUTPATIENT)
Dept: SCHEDULING | Facility: HOME HEALTH | Age: 52
End: 2017-10-25
Payer: COMMERCIAL

## 2017-10-25 VITALS
RESPIRATION RATE: 18 BRPM | HEART RATE: 64 BPM | SYSTOLIC BLOOD PRESSURE: 122 MMHG | TEMPERATURE: 96.8 F | DIASTOLIC BLOOD PRESSURE: 78 MMHG

## 2017-10-25 PROCEDURE — G0157 HHC PT ASSISTANT EA 15: HCPCS

## 2017-10-30 ENCOUNTER — HOME CARE VISIT (OUTPATIENT)
Dept: HOME HEALTH SERVICES | Facility: HOME HEALTH | Age: 52
End: 2017-10-30
Payer: COMMERCIAL

## 2017-12-06 ENCOUNTER — HOSPITAL ENCOUNTER (OUTPATIENT)
Dept: SURGERY | Age: 52
Discharge: HOME | End: 2017-12-06

## 2017-12-07 VITALS — HEIGHT: 75 IN | BODY MASS INDEX: 30.84 KG/M2 | WEIGHT: 248 LBS

## 2017-12-07 NOTE — PERIOP NOTES
Patient verified name, , and surgery as listed in [facility]. Type 1A/1B surgery, phone assessment complete. Orders in EMR- received. Labs per surgeon: none  Labs per anesthesia protocol: none    Patient answered medical/surgical history questions at their best of ability. All prior to admission medications documented in [facility]. Patient instructed to take the following medications the day of surgery according to anesthesia guidelines with a small sip of water: metoprolol and gabapentin, flonase PRN, flexeril PRN. Hold all vitamins 7 days prior to surgery and NSAIDS 5 days prior to surgery. Medications to be held- vitamins. Patient instructed on the following:  Arrive at A Entrance, time of arrival to be called the day before by 1700  NPO after midnight including gum, mints, and ice chips  Responsible adult must drive patient to the hospital, stay during surgery, and patient will need supervision 24 hours after anesthesia  Use antibacterial soap in shower the night before surgery and on the morning of surgery  Leave all valuables (money and jewelry) at home but bring insurance card and ID on DOS  Do not wear make-up, nail polish, lotions, cologne, perfumes, powders, or oil on skin. Patient teach back successful and patient demonstrates knowledge of instruction.

## 2017-12-10 ENCOUNTER — ANESTHESIA EVENT (OUTPATIENT)
Dept: SURGERY | Age: 52
End: 2017-12-10
Payer: COMMERCIAL

## 2017-12-11 ENCOUNTER — ANESTHESIA (OUTPATIENT)
Dept: SURGERY | Age: 52
End: 2017-12-11
Payer: COMMERCIAL

## 2017-12-11 ENCOUNTER — APPOINTMENT (OUTPATIENT)
Dept: GENERAL RADIOLOGY | Age: 52
End: 2017-12-11
Attending: ORTHOPAEDIC SURGERY
Payer: COMMERCIAL

## 2017-12-11 ENCOUNTER — HOSPITAL ENCOUNTER (OUTPATIENT)
Age: 52
Setting detail: OUTPATIENT SURGERY
Discharge: HOME | End: 2017-12-11
Attending: ORTHOPAEDIC SURGERY | Admitting: ORTHOPAEDIC SURGERY
Payer: COMMERCIAL

## 2017-12-11 VITALS
RESPIRATION RATE: 16 BRPM | BODY MASS INDEX: 31 KG/M2 | TEMPERATURE: 97.3 F | SYSTOLIC BLOOD PRESSURE: 117 MMHG | HEART RATE: 80 BPM | OXYGEN SATURATION: 94 % | WEIGHT: 248 LBS | DIASTOLIC BLOOD PRESSURE: 71 MMHG

## 2017-12-11 PROCEDURE — 77030003602 HC NDL NRV BLK BBMI -B: Performed by: ANESTHESIOLOGY

## 2017-12-11 PROCEDURE — 76210000006 HC OR PH I REC 0.5 TO 1 HR: Performed by: ORTHOPAEDIC SURGERY

## 2017-12-11 PROCEDURE — 74011000250 HC RX REV CODE- 250: Performed by: ANESTHESIOLOGY

## 2017-12-11 PROCEDURE — 73560 X-RAY EXAM OF KNEE 1 OR 2: CPT

## 2017-12-11 PROCEDURE — 74011250636 HC RX REV CODE- 250/636

## 2017-12-11 PROCEDURE — 74011250636 HC RX REV CODE- 250/636: Performed by: ANESTHESIOLOGY

## 2017-12-11 PROCEDURE — 76010000154 HC OR TIME FIRST 0.5 HR: Performed by: ORTHOPAEDIC SURGERY

## 2017-12-11 PROCEDURE — 76210000020 HC REC RM PH II FIRST 0.5 HR: Performed by: ORTHOPAEDIC SURGERY

## 2017-12-11 PROCEDURE — 76060000031 HC ANESTHESIA FIRST 0.5 HR: Performed by: ORTHOPAEDIC SURGERY

## 2017-12-11 RX ORDER — ROPIVACAINE HYDROCHLORIDE 2 MG/ML
INJECTION, SOLUTION EPIDURAL; INFILTRATION; PERINEURAL AS NEEDED
Status: DISCONTINUED | OUTPATIENT
Start: 2017-12-11 | End: 2017-12-11 | Stop reason: HOSPADM

## 2017-12-11 RX ORDER — SODIUM CHLORIDE 0.9 % (FLUSH) 0.9 %
5-10 SYRINGE (ML) INJECTION EVERY 8 HOURS
Status: DISCONTINUED | OUTPATIENT
Start: 2017-12-11 | End: 2017-12-11 | Stop reason: HOSPADM

## 2017-12-11 RX ORDER — HYDROMORPHONE HYDROCHLORIDE 2 MG/ML
0.5 INJECTION, SOLUTION INTRAMUSCULAR; INTRAVENOUS; SUBCUTANEOUS
Status: COMPLETED | OUTPATIENT
Start: 2017-12-11 | End: 2017-12-11

## 2017-12-11 RX ORDER — FLUMAZENIL 0.1 MG/ML
0.2 INJECTION INTRAVENOUS
Status: DISCONTINUED | OUTPATIENT
Start: 2017-12-11 | End: 2017-12-11 | Stop reason: HOSPADM

## 2017-12-11 RX ORDER — OXYCODONE HYDROCHLORIDE 5 MG/1
10 TABLET ORAL
Status: DISCONTINUED | OUTPATIENT
Start: 2017-12-11 | End: 2017-12-11 | Stop reason: HOSPADM

## 2017-12-11 RX ORDER — ACETAMINOPHEN 500 MG
1000 TABLET ORAL ONCE
Status: DISCONTINUED | OUTPATIENT
Start: 2017-12-11 | End: 2017-12-11 | Stop reason: HOSPADM

## 2017-12-11 RX ORDER — MIDAZOLAM HYDROCHLORIDE 1 MG/ML
2 INJECTION, SOLUTION INTRAMUSCULAR; INTRAVENOUS
Status: DISCONTINUED | OUTPATIENT
Start: 2017-12-11 | End: 2017-12-11 | Stop reason: HOSPADM

## 2017-12-11 RX ORDER — SODIUM CHLORIDE, SODIUM LACTATE, POTASSIUM CHLORIDE, CALCIUM CHLORIDE 600; 310; 30; 20 MG/100ML; MG/100ML; MG/100ML; MG/100ML
100 INJECTION, SOLUTION INTRAVENOUS CONTINUOUS
Status: DISCONTINUED | OUTPATIENT
Start: 2017-12-11 | End: 2017-12-11 | Stop reason: HOSPADM

## 2017-12-11 RX ORDER — PROPOFOL 10 MG/ML
INJECTION, EMULSION INTRAVENOUS AS NEEDED
Status: DISCONTINUED | OUTPATIENT
Start: 2017-12-11 | End: 2017-12-11 | Stop reason: HOSPADM

## 2017-12-11 RX ORDER — MIDAZOLAM HYDROCHLORIDE 1 MG/ML
2 INJECTION, SOLUTION INTRAMUSCULAR; INTRAVENOUS ONCE
Status: COMPLETED | OUTPATIENT
Start: 2017-12-11 | End: 2017-12-11

## 2017-12-11 RX ORDER — ACETAMINOPHEN 10 MG/ML
1000 INJECTION, SOLUTION INTRAVENOUS ONCE
Status: COMPLETED | OUTPATIENT
Start: 2017-12-11 | End: 2017-12-11

## 2017-12-11 RX ORDER — SODIUM CHLORIDE, SODIUM LACTATE, POTASSIUM CHLORIDE, CALCIUM CHLORIDE 600; 310; 30; 20 MG/100ML; MG/100ML; MG/100ML; MG/100ML
INJECTION, SOLUTION INTRAVENOUS
Status: DISCONTINUED | OUTPATIENT
Start: 2017-12-11 | End: 2017-12-11 | Stop reason: HOSPADM

## 2017-12-11 RX ORDER — LIDOCAINE HYDROCHLORIDE 10 MG/ML
0.1 INJECTION INFILTRATION; PERINEURAL AS NEEDED
Status: DISCONTINUED | OUTPATIENT
Start: 2017-12-11 | End: 2017-12-11 | Stop reason: HOSPADM

## 2017-12-11 RX ORDER — OXYCODONE HYDROCHLORIDE 5 MG/1
5 TABLET ORAL
Status: DISCONTINUED | OUTPATIENT
Start: 2017-12-11 | End: 2017-12-11 | Stop reason: HOSPADM

## 2017-12-11 RX ORDER — SODIUM CHLORIDE 0.9 % (FLUSH) 0.9 %
5-10 SYRINGE (ML) INJECTION AS NEEDED
Status: DISCONTINUED | OUTPATIENT
Start: 2017-12-11 | End: 2017-12-11 | Stop reason: HOSPADM

## 2017-12-11 RX ORDER — NALOXONE HYDROCHLORIDE 0.4 MG/ML
0.2 INJECTION, SOLUTION INTRAMUSCULAR; INTRAVENOUS; SUBCUTANEOUS AS NEEDED
Status: DISCONTINUED | OUTPATIENT
Start: 2017-12-11 | End: 2017-12-11 | Stop reason: HOSPADM

## 2017-12-11 RX ORDER — FENTANYL CITRATE 50 UG/ML
100 INJECTION, SOLUTION INTRAMUSCULAR; INTRAVENOUS ONCE
Status: COMPLETED | OUTPATIENT
Start: 2017-12-11 | End: 2017-12-11

## 2017-12-11 RX ORDER — HYDRALAZINE HYDROCHLORIDE 20 MG/ML
20 INJECTION INTRAMUSCULAR; INTRAVENOUS ONCE
Status: DISCONTINUED | OUTPATIENT
Start: 2017-12-11 | End: 2017-12-11 | Stop reason: HOSPADM

## 2017-12-11 RX ORDER — DIPHENHYDRAMINE HYDROCHLORIDE 50 MG/ML
12.5 INJECTION, SOLUTION INTRAMUSCULAR; INTRAVENOUS
Status: DISCONTINUED | OUTPATIENT
Start: 2017-12-11 | End: 2017-12-11 | Stop reason: HOSPADM

## 2017-12-11 RX ADMIN — MIDAZOLAM HYDROCHLORIDE 2 MG: 1 INJECTION, SOLUTION INTRAMUSCULAR; INTRAVENOUS at 06:44

## 2017-12-11 RX ADMIN — HYDROMORPHONE HYDROCHLORIDE 0.5 MG: 2 INJECTION, SOLUTION INTRAMUSCULAR; INTRAVENOUS; SUBCUTANEOUS at 07:47

## 2017-12-11 RX ADMIN — HYDROMORPHONE HYDROCHLORIDE 0.5 MG: 2 INJECTION, SOLUTION INTRAMUSCULAR; INTRAVENOUS; SUBCUTANEOUS at 07:52

## 2017-12-11 RX ADMIN — HYDROMORPHONE HYDROCHLORIDE 0.5 MG: 2 INJECTION, SOLUTION INTRAMUSCULAR; INTRAVENOUS; SUBCUTANEOUS at 07:42

## 2017-12-11 RX ADMIN — FENTANYL CITRATE 100 MCG: 50 INJECTION INTRAMUSCULAR; INTRAVENOUS at 06:44

## 2017-12-11 RX ADMIN — LIDOCAINE HYDROCHLORIDE 0.1 ML: 10 INJECTION, SOLUTION INFILTRATION; PERINEURAL at 06:23

## 2017-12-11 RX ADMIN — PROPOFOL 50 MG: 10 INJECTION, EMULSION INTRAVENOUS at 07:16

## 2017-12-11 RX ADMIN — HYDROMORPHONE HYDROCHLORIDE: 2 INJECTION, SOLUTION INTRAMUSCULAR; INTRAVENOUS; SUBCUTANEOUS at 07:37

## 2017-12-11 RX ADMIN — SODIUM CHLORIDE, SODIUM LACTATE, POTASSIUM CHLORIDE, CALCIUM CHLORIDE: 600; 310; 30; 20 INJECTION, SOLUTION INTRAVENOUS at 07:11

## 2017-12-11 RX ADMIN — ACETAMINOPHEN 1000 MG: 10 INJECTION, SOLUTION INTRAVENOUS at 08:01

## 2017-12-11 RX ADMIN — PROPOFOL 50 MG: 10 INJECTION, EMULSION INTRAVENOUS at 07:13

## 2017-12-11 RX ADMIN — ROPIVACAINE HYDROCHLORIDE 25 ML: 2 INJECTION, SOLUTION EPIDURAL; INFILTRATION; PERINEURAL at 06:49

## 2017-12-11 RX ADMIN — SODIUM CHLORIDE, SODIUM LACTATE, POTASSIUM CHLORIDE, AND CALCIUM CHLORIDE 100 ML/HR: 600; 310; 30; 20 INJECTION, SOLUTION INTRAVENOUS at 06:12

## 2017-12-11 RX ADMIN — PROPOFOL 50 MG: 10 INJECTION, EMULSION INTRAVENOUS at 07:14

## 2017-12-11 NOTE — OP NOTES
[facility]  Closed Manipulation of Total Knee Arthroplasty    Patient:Abrahan Child   : 1965  Medical Record [id number]  Pre-operative Diagnosis:  Stiffness of left knee [M25.662]  History of left knee replacement [Z96.652]  Post-operative Diagnosis: Stiffness of left knee [M25.662]  History of left knee replacement [Z96.652]  Procedure: Manipulation of left knee under conscious sedation  Location: [address] [place] [address]  Surgeon: Beto Maguire MD  Anesthesia: Adductor Canal Block + conscious sedation  EBL: none  Antibiotics: None    Description of Procedure: The patient was brought to the PACU. An adductor canal nerve block was administered prior to procedure. A timeout was conducted and documented by nursing. No antibiotics were given prior to procedure. The patient the received conscious sedation administered by anesthesia. Pre-procedureROM was noted to be 0-85 degrees. A closed manipulation was performed on the knee. Upon completion the ROM was 0-120 degrees. Ligaments were stable. Extensor mechanism was intact. Ice was applied to the knee. The patient tolerated the procedure w/o difficulty. Post-manipulation Xrays showed no periprosthetic fracture. Patient will be discharged post-procedure and proceed to PT later today. Signed By: Beto Maguire MD  2017,  11:24 AM    I have reviewed the patients controlled substance prescription history, as maintained in the [place] prescription monitoring program, so that the prescription(s) for a  controlled substance can be given.

## 2017-12-11 NOTE — H&P
[address] [facility]  Pre Operative History and Physical Exam    Patient ID:  Justin Uribe  [id number]  42 y.o.  1965    Today: December 11, 2017          CC:  Left Knee stiffness after arthroplasty    HPI:   The patient has stiffness of the left knee after arthroplasty. Patient was seen in office and range of motion was noted to be 0-90. At this time I feel physical therapy alone will not result in the range of motion the patient or I would hope for. We have discussed manipulation of the knee under sedation previously and the patient wishes to proceed. There have been no changes to the patient's orthopedic condition since the last office visit.     Past Medical History:  Past Medical History:   Diagnosis Date    Adverse effect of anesthesia     after hernia surgery in 2016 here at [facility] \"I had trouble breathing and I blacked out\"    BMI 31.0-31.9,adult     Chronic pain     DDD (degenerative disc disease), cervical 07/20/2016    several surgeries to correct    GERD (gastroesophageal reflux disease)     seldom     History of kidney stones     numerous, lithotripsy x 1    Hypertension     controlled with meds    Nausea & vomiting     sometimes after anesthesia    OA (osteoarthritis)     PUD (peptic ulcer disease)     no recent episodes    Sleep apnea     c-pap    Thromboembolus ([address] 2010    left knee s/p knee arth    Thromboembolus ([address] 2007    s/p lithotripsy-     Vertigo        Past Surgical History:  Past Surgical History:   Procedure Laterality Date    HX APPENDECTOMY  as teen     HX CERVICAL DISKECTOMY  2015    [address]    x 3 fusion    HX HERNIA REPAIR Bilateral 2016    HX HERNIA REPAIR      umbilical     HX KNEE ARTHROSCOPY Left     left knee x 2    HX KNEE REPLACEMENT Left 10/09/2017    HX LAP CHOLECYSTECTOMY  2011    HX LITHOTRIPSY      HX LUMBAR LAMINECTOMY  1995    lumbar laminectomy    HX LYSIS OF ADHESIONS  2012    HX SEPTOPLASTY  2014    HX TONSILLECTOMY  1993        Medications:     Prior to Admission medications    Medication Sig Start Date End Date Taking? Authorizing Provider   polyethylene glycol (MIRALAX) 17 gram packet Take 17 g by mouth daily. Indications: constipation   Yes Anthony Gonsales MD   lisinopril-hydroCHLOROthiazide [facility], ZESTORETIC) 20-25 mg per tablet TAKE 1 TABLET DAILY 10/9/17  Yes Odette Padilla MD   acetaminophen (TYLENOL) 500 mg tablet Take 2 Tabs by mouth every six (6) hours. 10/10/17  Yes Anthony Gonsales MD   aspirin delayed-release 325 mg tablet Take 1 Tab by mouth every twelve (12) hours every twelve (12) hours. 10/9/17  Yes Anthony Gonsales MD   celecoxib (CELEBREX) 200 mg capsule Take 1 Cap by mouth two (2) times a day for 90 days. 10/9/17 1/7/18 Yes Anthony Gonsales MD   cyclobenzaprine (FLEXERIL) 10 mg tablet Take 0.5 Tabs by mouth three (3) times daily. 10/9/17  Yes Anthony Gonsales MD   senna-docusate (PERICOLACE) 8.6-50 mg per tablet Take 2 Tabs by mouth daily. 10/10/17  Yes Anthony Gonsales MD   zolpidem (AMBIEN) 10 mg tablet Take 1 Tab by mouth nightly as needed for Sleep. Max Daily Amount: 10 mg. 10/9/17  Yes Anthony Gonsales MD   metoprolol succinate (TOPROL-XL) 50 mg XL tablet Take 1 Tab by mouth daily. 9/12/17  Yes Odette Padilla MD   fluticasone [facility]) 50 mcg/actuation nasal spray 2 sprays to each nostril QD 8/29/17  Yes Odette Padilla MD   lisinopril-hydroCHLOROthiazide (PRINZIDE, ZESTORETIC) 20-25 mg per tablet Take 1 Tab by mouth daily. 8/29/17  Yes Odette Padilla MD   potassium chloride SR (KLOR-CON 10) 10 mEq tablet Take 1 Tab by mouth daily. 8/29/17  Yes Odette Padilla MD   cpap machine kit by Does Not Apply route. 12 cm   Yes Historical Provider   potassium chloride SR (KLOR-CON 10) 10 mEq tablet TAKE 1 TABLET DAILY 10/9/17   Odette Padilla MD   gabapentin (NEURONTIN) 100 mg capsule Take 1 Cap by mouth two (2) times a day.  10/9/17   Anthony Gonsales MD   HYDROmorphone (DILAUDID) 2 mg tablet Take 1 Tab by mouth every four (4) hours as needed. Max Daily Amount: 12 mg. Patient taking differently: Take 1 Tab by mouth every four (4) hours as needed for Pain. 10/9/17   Kirk Foley MD   ondansetron (ZOFRAN ODT) 8 mg disintegrating tablet Take 1 Tab by mouth every eight (8) hours as needed for Nausea. 10/9/17   Kirk Foley MD   MOTION SICKNESS RELIEF,MECLIZ, 25 mg chewable tablet TAKE 1 TABLET BY MOUTH EVERY 4 HOURS AS NEEDED FOR VERTIGO OR NAUSEA 12/28/16   Historical Provider       Family History:     Family History   Problem Relation Age of Onset    Heart Disease Mother      CHF    Hypertension Mother     Cancer Father      lung    Hypertension Father     Hypertension Sister     Diabetes Brother     Stroke Brother     Hypertension Brother        Social History:      Social History   Substance Use Topics    Smoking status: Never Smoker    Smokeless tobacco: Never Used    Alcohol use 0.0 oz/week     1 - 2 Cans of beer per week      Comment: about 4 drinks every 2 weeks         Allergies: Allergies   Allergen Reactions    Codeine Itching        Vitals:     Visit Vitals    BP (!) 205/88 (BP 1 Location: Right arm, BP Patient Position: At rest)    Pulse 60    Temp 98.1 °F (36.7 °C)    Resp 16    Wt 112.5 kg (248 lb)    SpO2 91%    BMI 31 kg/m2         Objective:         General: No Acute distress                   HEENT: Normocephalic/atramatic                   Lungs:  Breathing non-labored                   Heart:   RRR                    Abdomen: soft       Extremities:  Prior incision benign. Pain with ROM of the right knee. Overall range of motion is 0-90 degrees. Distally the patient shows no neurologic deficit. Antalgic gait appreciated.      Meds:   Current Facility-Administered Medications   Medication Dose Route Frequency    lidocaine (XYLOCAINE) 10 mg/mL (1 %) injection 0.1 mL  0.1 mL SubCUTAneous PRN    lactated Ringers infusion  100 mL/hr IntraVENous CONTINUOUS  sodium chloride (NS) flush 5-10 mL  5-10 mL IntraVENous Q8H    sodium chloride (NS) flush 5-10 mL  5-10 mL IntraVENous PRN    midazolam (VERSED) injection 2 mg  2 mg IntraVENous ONCE PRN       Patient Active Problem List   Diagnosis Code    HNP (herniated nucleus pulposus) with myelopathy, cervical M50.00    Kidney stone N20.0    Deep vein thrombosis (DVT) of popliteal vein ([facility]) I82.439    DDD (degenerative disc disease), cervical M50.30    Left inguinal hernia K40.90    MARILYN (obstructive sleep apnea) G47.33    Hypersomnia G47.10    Neck pain M54.2    Bilateral inguinal hernia without obstruction or gangrene K40.20    S/P hernia repair Z98.890, Z87.19    Cervical radiculopathy M54.12    Osteoarthritis of cervical spine M47.812    OA (osteoarthritis) of knee M17.10       Assessment:   1. Stiffness of the knee after arthroplasty    Plan:   Risks, benefits, alternatives and possible complications of manipulation of the left knee have been discussed with the patient. The patient has been given the opportunity to ask questions all of which have been answered and the patient wishes to proceed. The patient will be admitted the day of surgery for manipulation of the left knee under sedation.         Signed By: Caitlin Phoenix MD  December 11, 2017

## 2017-12-11 NOTE — IP AVS SNAPSHOT
[facility] [id number] 
[phone] Patient: Rusty Bass MRN: [id number] :1965 About your hospitalization You were admitted on:  2017 You last received care in the:  [facility] PACU You were discharged on:  2017 Why you were hospitalized Your primary diagnosis was:  Not on File Things You Need To Do (next 8 weeks) Follow up with Rajesh Norton MD  
  
Phone:  [phone] Where:  [address] [facility] [address]  CPAP with PP CPAP RESOURCE at  9:40 AM  
Where:  [address] ([facility]) Discharge Orders None A check kavon indicates which time of day the medication should be taken. My Medications ASK your physician about these medications Instructions Each Dose to Equal  
 Morning Noon Evening Bedtime  
 acetaminophen 500 mg tablet Commonly known as:  TYLENOL Your last dose was: Your next dose is: Take 2 Tabs by mouth every six (6) hours. 1000 mg  
    
   
   
   
  
 aspirin delayed-release 325 mg tablet Your last dose was: Your next dose is: Take 1 Tab by mouth every twelve (12) hours every twelve (12) hours. 325 mg  
    
   
   
   
  
 celecoxib 200 mg capsule Commonly known as:  CELEBREX Your last dose was: Your next dose is: Take 1 Cap by mouth two (2) times a day for 90 days. 200 mg  
    
   
   
   
  
 cpap machine kit Your last dose was: Your next dose is:    
   
   
 by Does Not Apply route. 12 cm  
     
   
   
   
  
 cyclobenzaprine 10 mg tablet Commonly known as:  FLEXERIL Your last dose was: Your next dose is: Take 0.5 Tabs by mouth three (3) times daily. 5 mg fluticasone 50 mcg/actuation nasal spray Commonly known as:  Shant Sow Your last dose was: Your next dose is:    
   
   
 2 sprays to each nostril QD  
     
   
   
   
  
 gabapentin 100 mg capsule Commonly known as:  NEURONTIN Your last dose was: Your next dose is: Take 1 Cap by mouth two (2) times a day. 100 mg HYDROmorphone 2 mg tablet Commonly known as:  DILAUDID Your last dose was: Your next dose is: Take 1 Tab by mouth every four (4) hours as needed. Max Daily Amount: 12 mg.  
 2 mg * lisinopril-hydroCHLOROthiazide 20-25 mg per tablet Commonly known as:  Faizan Notice Your last dose was: Your next dose is: Take 1 Tab by mouth daily. 1 Tab * lisinopril-hydroCHLOROthiazide 20-25 mg per tablet Commonly known as:  Faizan Notice Your last dose was: Your next dose is: TAKE 1 TABLET DAILY  
     
   
   
   
  
 metoprolol succinate 50 mg XL tablet Commonly known as:  TOPROL-XL Your last dose was: Your next dose is: Take 1 Tab by mouth daily. 50 mg Motion Sickness Relief(Mecliz) 25 mg chewable tablet Generic drug:  meclizine Your last dose was: Your next dose is: TAKE 1 TABLET BY MOUTH EVERY 4 HOURS AS NEEDED FOR VERTIGO OR NAUSEA  
     
   
   
   
  
 ondansetron 8 mg disintegrating tablet Commonly known as:  ZOFRAN ODT Your last dose was: Your next dose is: Take 1 Tab by mouth every eight (8) hours as needed for Nausea. 8 mg  
    
   
   
   
  
 polyethylene glycol 17 gram packet Commonly known as:  Elierika Kessler Your last dose was: Your next dose is: Take 17 g by mouth daily. Indications: constipation 17 g * potassium chloride SR 10 mEq tablet Commonly known as:  KLOR-CON 10 Your last dose was: Your next dose is: Take 1 Tab by mouth daily. 10 mEq * potassium chloride SR 10 mEq tablet Commonly known as:  KLOR-CON 10 Your last dose was: Your next dose is: TAKE 1 TABLET DAILY  
     
   
   
   
  
 senna-docusate 8.6-50 mg per tablet Commonly known as:  Helga Friend Your last dose was: Your next dose is: Take 2 Tabs by mouth daily. 2 Tab  
    
   
   
   
  
 zolpidem 10 mg tablet Commonly known as:  AMBIEN Your last dose was: Your next dose is: Take 1 Tab by mouth nightly as needed for Sleep. Max Daily Amount: 10 mg.  
 10 mg  
    
   
   
   
  
 * Notice: This list has 4 medication(s) that are the same as other medications prescribed for you. Read the directions carefully, and ask your doctor or other care provider to review them with you. Discharge Instructions Instructions Following Ambulatory Surgery Activity · As tolerated and directed by your doctor · Bathe or shower as directed by your doctor Diet · Clear liquids until no nausea or vomiting; then light diet for the first day · Advance to regular diet on second day, unless your doctor orders otherwise · If nausea and vomiting continues, call your doctor Pain · Take pain medication as directed by your doctor ·  Call your doctor if pain is NOT relieved by medication · DO NOT take aspirin or blood thinners until directed by your doctor Follow-Up Phone Calls · Will be made nursing staff · If you have any problems, call your doctor as needed Call your doctor if 
· Excessive bleeding that does not stop after holding mild pressure over the area · Temperature of 101 degrees F or above · Redness,excessive swelling or bruising, and/or green or yellow, smelly discharge from incision After Anesthesia · For the first 24 hours: DO NOT Drive, Drink alcoholic beverages, or Make important decisions · Be aware of dizziness following anesthesia and while taking pain medication Introducing Kuldeep Barragan! Dear Tameka Barker: Thank you for requesting a EverConnect account. Our records indicate that you already have an active EverConnect account. You can access your account anytime at [email or web address] Did you know that you can access your hospital and ER discharge instructions at any time in EverConnect? You can also review all of your test results from your hospital stay or ER visit. Additional Information If you have questions, please visit the Frequently Asked Questions section of the EverConnect website at [email or web address] Remember, EverConnect is NOT to be used for urgent needs. For medical emergencies, dial 911. Now available from your iPhone and Android! Providers Seen During Your Hospitalization Provider Specialty Primary office phone Nika Loya MD Orthopedic Surgery [phone] Your Primary Care Physician (PCP) Primary Care Physician Office Phone Office Fax Ken Jimenez [phone] You are allergic to the following Allergen Reactions Codeine Itching Recent Documentation Weight BMI Smoking Status 112.5 kg 31 kg/m2 Never Smoker Emergency Contacts Name Discharge Info Relation Home Work Mobile DanyellJeannette DISCHARGE CAREGIVER [3] Spouse [3] [phone] Lisa Mcgregor  Sister [23] [phone] Patient Belongings The following personal items are in your possession at time of discharge: 
                             
 
  
  
 Please provide this summary of care documentation to your next provider.  
  
  
 
  
Signatures-by signing, you are acknowledging that this After Visit Summary has been reviewed with you and you have received a copy. Patient Signature:  ____________________________________________________________ Date:  ____________________________________________________________  
  
Dipika Brighton Provider Signature:  ____________________________________________________________ Date:  ____________________________________________________________

## 2017-12-11 NOTE — DISCHARGE INSTRUCTIONS
Instructions Following Ambulatory Surgery    Activity  · As tolerated and directed by your doctor  · Bathe or shower as directed by your doctor    Diet  · Clear liquids until no nausea or vomiting; then light diet for the first day  · Advance to regular diet on second day, unless your doctor orders otherwise  · If nausea and vomiting continues, call your doctor    Pain  · Take pain medication as directed by your doctor  ·  Call your doctor if pain is NOT relieved by medication  · DO NOT take aspirin or blood thinners until directed by your doctor    Follow-Up Phone Calls  · Will be made nursing staff  · If you have any problems, call your doctor as needed    Call your doctor if  · Excessive bleeding that does not stop after holding mild pressure over the area  · Temperature of 101 degrees F or above  · Redness,excessive swelling or bruising, and/or green or yellow, smelly discharge from incision    After Anesthesia  · For the first 24 hours: DO NOT Drive, Drink alcoholic beverages, or Make important decisions  · Be aware of dizziness following anesthesia and while taking pain medication

## 2017-12-11 NOTE — ANESTHESIA PREPROCEDURE EVALUATION
Anesthetic History     PONV          Review of Systems / Medical History  Patient summary reviewed, nursing notes reviewed and pertinent labs reviewed    Pulmonary        Sleep apnea: CPAP        Comments: In w/u to get CPAP   Neuro/Psych   Within defined limits          Comments: Cervical and lumbar disc disease Cardiovascular    Hypertension: well controlled              Exercise tolerance: >4 METS  Comments: Denies CP, SOB or changes in functional status   GI/Hepatic/Renal     GERD: well controlled      PUD     Endo/Other        Obesity and arthritis     Other Findings   Comments: Hx/o DVT           Physical Exam    Airway  Mallampati: II  TM Distance: 4 - 6 cm  Neck ROM: decreased range of motion   Mouth opening: Normal    Comments: H/O multiple neck surgeries Cardiovascular    Rhythm: regular  Rate: normal         Dental    Dentition: Caps/crowns     Pulmonary  Breath sounds clear to auscultation               Abdominal  GI exam deferred       Other Findings            Anesthetic Plan    ASA: 2  Anesthesia type: total IV anesthesia      Post-op pain plan if not by surgeon: peripheral nerve block single    Induction: Intravenous  Anesthetic plan and risks discussed with: Patient

## 2017-12-11 NOTE — ANESTHESIA PROCEDURE NOTES
Peripheral Block    Start time: 12/11/2017 6:44 AM  End time: 12/11/2017 6:49 AM  Performed by: Vasile Gutierrez  Authorized by: Vasile Gutierrez       Pre-procedure: Indications: at surgeon's request and post-op pain management    Preanesthetic Checklist: patient identified, risks and benefits discussed, site marked, timeout performed, anesthesia consent given and patient being monitored      Block Type:   Block Type:   Adductor canal  Laterality:  Left  Monitoring:  Continuous pulse ox, frequent vital sign checks, heart rate, responsive to questions and oxygen  Injection Technique:  Single shot  Procedures: ultrasound guided    Prep: chlorhexidine    Location:  Mid thigh  Needle Type:  Stimuplex  Needle Gauge:  20 G  Needle Localization:  Anatomical landmarks, infiltration and ultrasound guidance  Medication Injected:  0.2%  ropivacaine  Volume (mL):  25    Assessment:  Number of attempts:  1  Injection Assessment:  Incremental injection every 5 mL, negative aspiration for CSF, local visualized surrounding nerve on ultrasound, negative aspiration for blood, no intravascular symptoms, no paresthesia and ultrasound image on chart  Patient tolerance:  Patient tolerated the procedure well with no immediate complications

## 2017-12-15 NOTE — ANESTHESIA POSTPROCEDURE EVALUATION
Post-Anesthesia Evaluation and Assessment    Patient: Alison Gonzalez MRN: [id number]  SSN: [id number]    YOB: 1965  Age: 45 y.o. Sex: male       Cardiovascular Function/Vital Signs  Visit Vitals    /71 (BP 1 Location: Right arm, BP Patient Position: At rest)    Pulse 80    Temp 36.3 °C (97.3 °F)    Resp 16    Wt 112.5 kg (248 lb)    SpO2 94%    BMI 31 kg/m2       Patient is status post total IV anesthesia anesthesia for Procedure(s):  MANIPULATION LEFT KNEE. Nausea/Vomiting: None    Postoperative hydration reviewed and adequate. Pain:  Pain Scale 1: Numeric (0 - 10) (12/11/17 0801)  Pain Intensity 1: 6 (12/11/17 0801)   Managed    Neurological Status:   Neuro (WDL): Exceptions to WDL (12/11/17 0801)  Neuro  Neurologic State: Alert;Eyes open spontaneously (12/11/17 0807)  Cognition: Follows commands (12/11/17 0807)  LLE Motor Response: Weak (12/11/17 0807)   At baseline    Mental Status and Level of Consciousness: Arousable    Pulmonary Status:   O2 Device: Room air (12/11/17 0819)   Adequate oxygenation and airway patent    Complications related to anesthesia: None    Post-anesthesia assessment completed.  No concerns    Signed By: Jolie Hidalgo MD     December 14, 2017

## 2018-01-05 ENCOUNTER — HOSPITAL ENCOUNTER (OUTPATIENT)
Dept: SURGERY | Age: 53
Discharge: HOME | End: 2018-01-05

## 2018-01-05 ENCOUNTER — ANESTHESIA EVENT (OUTPATIENT)
Dept: SURGERY | Age: 53
End: 2018-01-05
Payer: COMMERCIAL

## 2018-01-05 VITALS — WEIGHT: 248 LBS | HEIGHT: 75 IN | BODY MASS INDEX: 30.84 KG/M2

## 2018-01-05 RX ORDER — ASPIRIN 81 MG/1
81 TABLET ORAL DAILY
COMMUNITY

## 2018-01-05 NOTE — PERIOP NOTES
Patient verified name, , and surgery as listed in [facility]. Type 1B surgery, PAT phone assessment complete. Orders not received. Labs per surgeon: no orders. Labs per anesthesia protocol: none per anesthesia protocol. Pt had surgery in December and states no changes to medical history. Patient answered medical/surgical history questions at their best of ability. All prior to admission medications documented in [facility]. Patient instructed to take the following medications the day of surgery according to anesthesia guidelines with a small sip of water: aspirin, celebrex, flonase and metoprolol . Hold all vitamins 7 days prior to surgery and NSAIDS 5 days prior to surgery. Medications to be held- none. Patient instructed on the following:  Arrive at A Entrance, time of arrival to be called the day before by 1700  NPO after midnight including gum, mints, and ice chips  Responsible adult must drive patient to the hospital, stay during surgery, and patient will  need supervision 24 hours after anesthesia  Use antibacterial soap in shower the night before surgery and on the morning of surgery  Leave all valuables (money and jewelry) at home but bring insurance card and ID on DOS  Do not wear make-up, nail polish, lotions, cologne, perfumes, powders, or oil on skin. Patient teach back successful and patient demonstrates knowledge of instruction.

## 2018-01-08 ENCOUNTER — APPOINTMENT (OUTPATIENT)
Dept: GENERAL RADIOLOGY | Age: 53
End: 2018-01-08
Attending: ORTHOPAEDIC SURGERY
Payer: COMMERCIAL

## 2018-01-08 ENCOUNTER — ANESTHESIA (OUTPATIENT)
Dept: SURGERY | Age: 53
End: 2018-01-08
Payer: COMMERCIAL

## 2018-01-08 ENCOUNTER — HOSPITAL ENCOUNTER (OUTPATIENT)
Age: 53
Setting detail: OUTPATIENT SURGERY
Discharge: HOME | End: 2018-01-08
Attending: ORTHOPAEDIC SURGERY | Admitting: ORTHOPAEDIC SURGERY
Payer: COMMERCIAL

## 2018-01-08 VITALS
OXYGEN SATURATION: 99 % | RESPIRATION RATE: 12 BRPM | BODY MASS INDEX: 30.84 KG/M2 | SYSTOLIC BLOOD PRESSURE: 167 MMHG | TEMPERATURE: 98.6 F | HEART RATE: 69 BPM | HEIGHT: 75 IN | DIASTOLIC BLOOD PRESSURE: 99 MMHG | WEIGHT: 248 LBS

## 2018-01-08 PROCEDURE — 76010000230: Performed by: ORTHOPAEDIC SURGERY

## 2018-01-08 PROCEDURE — 74011250636 HC RX REV CODE- 250/636

## 2018-01-08 PROCEDURE — 74011250636 HC RX REV CODE- 250/636: Performed by: ORTHOPAEDIC SURGERY

## 2018-01-08 PROCEDURE — 74011000250 HC RX REV CODE- 250: Performed by: ANESTHESIOLOGY

## 2018-01-08 PROCEDURE — 76942 ECHO GUIDE FOR BIOPSY: CPT | Performed by: ORTHOPAEDIC SURGERY

## 2018-01-08 PROCEDURE — 76210000016 HC OR PH I REC 1 TO 1.5 HR: Performed by: ORTHOPAEDIC SURGERY

## 2018-01-08 PROCEDURE — 77030003602 HC NDL NRV BLK BBMI -B: Performed by: NURSE ANESTHETIST, CERTIFIED REGISTERED

## 2018-01-08 PROCEDURE — 74011250636 HC RX REV CODE- 250/636: Performed by: ANESTHESIOLOGY

## 2018-01-08 PROCEDURE — 76010010054 HC POST OP PAIN BLOCK: Performed by: ORTHOPAEDIC SURGERY

## 2018-01-08 PROCEDURE — 76060000031 HC ANESTHESIA FIRST 0.5 HR: Performed by: ORTHOPAEDIC SURGERY

## 2018-01-08 PROCEDURE — 74011000250 HC RX REV CODE- 250: Performed by: ORTHOPAEDIC SURGERY

## 2018-01-08 PROCEDURE — 73560 X-RAY EXAM OF KNEE 1 OR 2: CPT

## 2018-01-08 RX ORDER — BUPIVACAINE HYDROCHLORIDE 5 MG/ML
INJECTION, SOLUTION EPIDURAL; INTRACAUDAL AS NEEDED
Status: DISCONTINUED | OUTPATIENT
Start: 2018-01-08 | End: 2018-01-08 | Stop reason: HOSPADM

## 2018-01-08 RX ORDER — FENTANYL CITRATE 50 UG/ML
100 INJECTION, SOLUTION INTRAMUSCULAR; INTRAVENOUS ONCE
Status: DISCONTINUED | OUTPATIENT
Start: 2018-01-08 | End: 2018-01-08 | Stop reason: HOSPADM

## 2018-01-08 RX ORDER — MIDAZOLAM HYDROCHLORIDE 1 MG/ML
2 INJECTION, SOLUTION INTRAMUSCULAR; INTRAVENOUS
Status: DISCONTINUED | OUTPATIENT
Start: 2018-01-08 | End: 2018-01-08 | Stop reason: HOSPADM

## 2018-01-08 RX ORDER — NALOXONE HYDROCHLORIDE 0.4 MG/ML
0.1 INJECTION, SOLUTION INTRAMUSCULAR; INTRAVENOUS; SUBCUTANEOUS AS NEEDED
Status: DISCONTINUED | OUTPATIENT
Start: 2018-01-08 | End: 2018-01-08 | Stop reason: HOSPADM

## 2018-01-08 RX ORDER — ROPIVACAINE HYDROCHLORIDE 2 MG/ML
INJECTION, SOLUTION EPIDURAL; INFILTRATION; PERINEURAL AS NEEDED
Status: DISCONTINUED | OUTPATIENT
Start: 2018-01-08 | End: 2018-01-08 | Stop reason: HOSPADM

## 2018-01-08 RX ORDER — ALBUTEROL SULFATE 0.83 MG/ML
2.5 SOLUTION RESPIRATORY (INHALATION) AS NEEDED
Status: DISCONTINUED | OUTPATIENT
Start: 2018-01-08 | End: 2018-01-08 | Stop reason: HOSPADM

## 2018-01-08 RX ORDER — SODIUM CHLORIDE, SODIUM LACTATE, POTASSIUM CHLORIDE, CALCIUM CHLORIDE 600; 310; 30; 20 MG/100ML; MG/100ML; MG/100ML; MG/100ML
100 INJECTION, SOLUTION INTRAVENOUS CONTINUOUS
Status: DISCONTINUED | OUTPATIENT
Start: 2018-01-08 | End: 2018-01-08 | Stop reason: HOSPADM

## 2018-01-08 RX ORDER — ONDANSETRON 2 MG/ML
4 INJECTION INTRAMUSCULAR; INTRAVENOUS ONCE
Status: DISCONTINUED | OUTPATIENT
Start: 2018-01-08 | End: 2018-01-08 | Stop reason: HOSPADM

## 2018-01-08 RX ORDER — MIDAZOLAM HYDROCHLORIDE 1 MG/ML
2 INJECTION, SOLUTION INTRAMUSCULAR; INTRAVENOUS ONCE
Status: COMPLETED | OUTPATIENT
Start: 2018-01-08 | End: 2018-01-08

## 2018-01-08 RX ORDER — METHYLPREDNISOLONE ACETATE 40 MG/ML
INJECTION, SUSPENSION INTRA-ARTICULAR; INTRALESIONAL; INTRAMUSCULAR; SOFT TISSUE AS NEEDED
Status: DISCONTINUED | OUTPATIENT
Start: 2018-01-08 | End: 2018-01-08 | Stop reason: HOSPADM

## 2018-01-08 RX ORDER — LIDOCAINE HYDROCHLORIDE 10 MG/ML
0.1 INJECTION INFILTRATION; PERINEURAL AS NEEDED
Status: DISCONTINUED | OUTPATIENT
Start: 2018-01-08 | End: 2018-01-08 | Stop reason: HOSPADM

## 2018-01-08 RX ORDER — PROPOFOL 10 MG/ML
INJECTION, EMULSION INTRAVENOUS AS NEEDED
Status: DISCONTINUED | OUTPATIENT
Start: 2018-01-08 | End: 2018-01-08 | Stop reason: HOSPADM

## 2018-01-08 RX ORDER — DIPHENHYDRAMINE HYDROCHLORIDE 50 MG/ML
12.5 INJECTION, SOLUTION INTRAMUSCULAR; INTRAVENOUS
Status: DISCONTINUED | OUTPATIENT
Start: 2018-01-08 | End: 2018-01-08 | Stop reason: HOSPADM

## 2018-01-08 RX ORDER — OXYCODONE HYDROCHLORIDE 5 MG/1
10 TABLET ORAL
Status: DISCONTINUED | OUTPATIENT
Start: 2018-01-08 | End: 2018-01-08 | Stop reason: HOSPADM

## 2018-01-08 RX ORDER — OXYCODONE HYDROCHLORIDE 5 MG/1
5 TABLET ORAL
Status: DISCONTINUED | OUTPATIENT
Start: 2018-01-08 | End: 2018-01-08 | Stop reason: HOSPADM

## 2018-01-08 RX ORDER — HYDROMORPHONE HYDROCHLORIDE 2 MG/ML
0.5 INJECTION, SOLUTION INTRAMUSCULAR; INTRAVENOUS; SUBCUTANEOUS
Status: DISCONTINUED | OUTPATIENT
Start: 2018-01-08 | End: 2018-01-08 | Stop reason: HOSPADM

## 2018-01-08 RX ADMIN — SODIUM CHLORIDE, SODIUM LACTATE, POTASSIUM CHLORIDE, AND CALCIUM CHLORIDE 100 ML/HR: 600; 310; 30; 20 INJECTION, SOLUTION INTRAVENOUS at 06:24

## 2018-01-08 RX ADMIN — HYDROMORPHONE HYDROCHLORIDE 0.5 MG: 2 INJECTION, SOLUTION INTRAMUSCULAR; INTRAVENOUS; SUBCUTANEOUS at 07:47

## 2018-01-08 RX ADMIN — HYDROMORPHONE HYDROCHLORIDE 0.5 MG: 2 INJECTION, SOLUTION INTRAMUSCULAR; INTRAVENOUS; SUBCUTANEOUS at 07:52

## 2018-01-08 RX ADMIN — HYDROMORPHONE HYDROCHLORIDE 0.5 MG: 2 INJECTION, SOLUTION INTRAMUSCULAR; INTRAVENOUS; SUBCUTANEOUS at 08:00

## 2018-01-08 RX ADMIN — LIDOCAINE HYDROCHLORIDE 0.1 ML: 10 INJECTION, SOLUTION INFILTRATION; PERINEURAL at 06:24

## 2018-01-08 RX ADMIN — HYDROMORPHONE HYDROCHLORIDE 0.5 MG: 2 INJECTION, SOLUTION INTRAMUSCULAR; INTRAVENOUS; SUBCUTANEOUS at 07:42

## 2018-01-08 RX ADMIN — MIDAZOLAM HYDROCHLORIDE 2 MG: 1 INJECTION, SOLUTION INTRAMUSCULAR; INTRAVENOUS at 06:44

## 2018-01-08 RX ADMIN — PROPOFOL 150 MG: 10 INJECTION, EMULSION INTRAVENOUS at 07:18

## 2018-01-08 RX ADMIN — SODIUM CHLORIDE, SODIUM LACTATE, POTASSIUM CHLORIDE, AND CALCIUM CHLORIDE: 600; 310; 30; 20 INJECTION, SOLUTION INTRAVENOUS at 07:17

## 2018-01-08 RX ADMIN — ROPIVACAINE HYDROCHLORIDE 20 ML: 2 INJECTION, SOLUTION EPIDURAL; INFILTRATION; PERINEURAL at 06:44

## 2018-01-08 RX ADMIN — PROPOFOL 50 MG: 10 INJECTION, EMULSION INTRAVENOUS at 07:20

## 2018-01-08 NOTE — DISCHARGE INSTRUCTIONS
Instructions Following Ambulatory Surgery    Activity  · As tolerated and directed by your doctor  · Bathe or shower as directed by your doctor    Diet  · Clear liquids until no nausea or vomiting; then light diet for the first day  · Advance to regular diet on second day, unless your doctor orders otherwise  · If nausea and vomiting continues, call your doctor    Pain  · Take pain medication as directed by your doctor  ·  Call your doctor if pain is NOT relieved by medication  ·     Follow-Up Phone Calls  · Will be made nursing staff  · If you have any problems, call your doctor as needed    Call your doctor if  · Excessive bleeding that does not stop after holding mild pressure over the area  · Temperature of 101 degrees F or above  · Redness,excessive swelling or bruising, and/or green or yellow, smelly discharge from incision    After Anesthesia  · For the first 24 hours: DO NOT Drive, Drink alcoholic beverages, or Make important decisions  · Be aware of dizziness following anesthesia and while taking pain medication

## 2018-01-08 NOTE — ANESTHESIA PREPROCEDURE EVALUATION
Anesthetic History               Review of Systems / Medical History  Patient summary reviewed, nursing notes reviewed and pertinent labs reviewed    Pulmonary        Sleep apnea: CPAP           Neuro/Psych              Cardiovascular    Hypertension: well controlled              Exercise tolerance: >4 METS     GI/Hepatic/Renal     GERD: well controlled           Endo/Other             Other Findings            Physical Exam    Airway  Mallampati: III  TM Distance: 4 - 6 cm  Neck ROM: normal range of motion   Mouth opening: Normal     Cardiovascular  Regular rate and rhythm,  S1 and S2 normal,  no murmur, click, rub, or gallop             Dental  No notable dental hx       Pulmonary  Breath sounds clear to auscultation               Abdominal         Other Findings            Anesthetic Plan    ASA: 2  Anesthesia type: total IV anesthesia          Induction: Intravenous  Anesthetic plan and risks discussed with: Patient           Anesthetic History     PONV          Review of Systems / Medical History  Patient summary reviewed, nursing notes reviewed and pertinent labs reviewed    Pulmonary        Sleep apnea: CPAP        Comments: In w/u to get CPAP   Neuro/Psych   Within defined limits          Comments: Cervical and lumbar disc disease Cardiovascular    Hypertension: well controlled              Exercise tolerance: >4 METS  Comments: Denies CP, SOB or changes in functional status   GI/Hepatic/Renal     GERD: well controlled      PUD     Endo/Other        Obesity and arthritis     Other Findings   Comments: Hx/o DVT           Physical Exam    Airway  Mallampati: II  TM Distance: 4 - 6 cm  Neck ROM: decreased range of motion   Mouth opening: Normal    Comments: H/O multiple neck surgeries Cardiovascular    Rhythm: regular  Rate: normal         Dental    Dentition: Caps/crowns     Pulmonary  Breath sounds clear to auscultation               Abdominal  GI exam deferred       Other Findings            Anesthetic Plan    ASA: 2  Anesthesia type: total IV anesthesia      Post-op pain plan if not by surgeon: peripheral nerve block single    Induction: Intravenous  Anesthetic plan and risks discussed with: Patient

## 2018-01-08 NOTE — OP NOTES
[address] [facility]  Closed Manipulation of Total Knee Arthroplasty    Patient:Abrahan Child   : 1965  [id number]  Pre-operative Diagnosis:  Left knee pain, unspecified chronicity [M25.562]  History of total knee replacement, left [Z96.652]  Post-operative Diagnosis: Left knee pain, unspecified chronicity [M25.562]  History of total knee replacement, left [Z96.652]  Procedure: Manipulation of left knee under conscious sedation  Location: [address] [place] [address]  Surgeon: Daily Page MD  Anesthesia: Adductor Canal Block + conscious sedation  EBL: none  Antibiotics: None    Description of Procedure: The patient was brought to the PACU. An adductor canal nerve block was administered prior to procedure. A timeout was conducted and documented by nursing. No antibiotics were given prior to procedure. The patient the received conscious sedation administered by anesthesia. An intra-articular injection consisting of 80mg Depo-Medrol and 3cc 0.25% Marcaine was performed under sterile technique. Pre-procedureROM was noted to be 0-85 degrees. A closed manipulation was performed on the knee. Upon completion the ROM was 0-115 degrees. Ligaments were stable. Extensor mechanism was intact. Ice was applied to the knee. The patient tolerated the procedure w/o difficulty. Post-manipulation Xrays showed no periprosthetic fracture. Patient will be discharged post-procedure and proceed to PT later today. Signed By: Daily Page MD  2018,  7:24 AM    I have reviewed the patients controlled substance prescription history, as maintained in the [place] prescription monitoring program, so that the prescription(s) for a  controlled substance can be given.

## 2018-01-08 NOTE — ANESTHESIA POSTPROCEDURE EVALUATION
Post-Anesthesia Evaluation and Assessment    Patient: Sukumar Moralez MRN: [id number]  SSN: [id number]    YOB: 1965  Age: 45 y.o. Sex: male       Cardiovascular Function/Vital Signs  Visit Vitals    /74    Pulse 64    Temp 36.7 °C (98 °F)    Resp 18    Ht 6' 3\" (1.905 m)    Wt 112.5 kg (248 lb)    SpO2 100%    BMI 31 kg/m2       Patient is status post total IV anesthesia anesthesia for Procedure(s):  MANIPULATION LEFT KNEE. Nausea/Vomiting: None    Postoperative hydration reviewed and adequate. Pain:  Pain Scale 1: Visual (01/08/18 0724)  Pain Intensity 1: 0 (01/08/18 0634)   Managed    Neurological Status:   Neuro (WDL): Exceptions to WDL (01/08/18 0724)  Neuro  Neurologic State: Sleeping (01/08/18 0724)  LUE Motor Response: Purposeful (01/08/18 0724)  LLE Motor Response: Pharmacologically paralyzed (01/08/18 0724)  RUE Motor Response: Purposeful (01/08/18 0724)  RLE Motor Response: Purposeful (01/08/18 0724)   At baseline    Mental Status and Level of Consciousness: Arousable    Pulmonary Status:   O2 Device: Nasal cannula (01/08/18 0739)   Adequate oxygenation and airway patent    Complications related to anesthesia: None    Post-anesthesia assessment completed.  No concerns    Signed By: Chemo Gray MD     January 8, 2018

## 2018-01-08 NOTE — H&P
[facility]  Pre Operative History and Physical Exam    Patient ID:  Flaquita Feliciano  [id number]  39 y.o.  1965    Today: January 8, 2018          CC:  Left Knee stiffness after arthroplasty    HPI:   The patient has stiffness of the left knee after arthroplasty. Patient was seen in office and range of motion was noted to be 0-85. At this time I feel physical therapy alone will not result in the range of motion the patient or I would hope for. We have discussed manipulation of the knee under sedation previously and the patient wishes to proceed. There have been no changes to the patient's orthopedic condition since the last office visit.     Past Medical History:  Past Medical History:   Diagnosis Date    Adverse effect of anesthesia     after hernia surgery in 2016 here at [address] \"I had trouble breathing and I blacked out\"    BMI 31.0-31.9,adult     Chronic pain     DDD (degenerative disc disease), cervical 07/20/2016    several surgeries to correct    GERD (gastroesophageal reflux disease)     seldom     History of kidney stones     numerous, lithotripsy x 1    Hypertension     controlled with meds    Nausea & vomiting     sometimes after anesthesia    OA (osteoarthritis)     PUD (peptic ulcer disease)     no recent episodes    Sleep apnea     c-pap    Thromboembolus ([address] 2010    left knee s/p knee arth    Thromboembolus ([address] 2007    s/p lithotripsy-     Vertigo        Past Surgical History:  Past Surgical History:   Procedure Laterality Date    HX APPENDECTOMY  as teen     HX CERVICAL DISKECTOMY  2015    [address]    x 3 fusion    HX HERNIA REPAIR Bilateral 2016    HX HERNIA REPAIR      umbilical     HX KNEE ARTHROSCOPY Left     left knee x 2    HX KNEE REPLACEMENT Left 10/09/2017    HX LAP CHOLECYSTECTOMY  2011    HX LITHOTRIPSY      HX LUMBAR LAMINECTOMY  1995    lumbar laminectomy    HX LYSIS OF ADHESIONS  2012    HX ORTHOPAEDIC Left 12/2017 Left knee manipulation    HX SEPTOPLASTY  2014    HX TONSILLECTOMY  1993        Medications:     Prior to Admission medications    Medication Sig Start Date End Date Taking? Authorizing Provider   aspirin delayed-release 81 mg tablet Take 81 mg by mouth daily. Yes Historical Provider   lisinopril-hydroCHLOROthiazide (PRINZIDE, ZESTORETIC) 20-25 mg per tablet TAKE 1 TABLET DAILY 10/9/17  Yes Williams Quintero MD   potassium chloride SR (KLOR-CON 10) 10 mEq tablet TAKE 1 TABLET DAILY 10/9/17  Yes Williams Quintero MD   celecoxib (CELEBREX) 200 mg capsule Take 1 Cap by mouth two (2) times a day for 90 days. 10/9/17 1/8/18 Yes Olegario Aburto MD   gabapentin (NEURONTIN) 100 mg capsule Take 1 Cap by mouth two (2) times a day. Patient taking differently: Take 100 mg by mouth two (2) times daily as needed. 10/9/17  Yes Olegario Aburto MD   metoprolol succinate (TOPROL-XL) 50 mg XL tablet Take 1 Tab by mouth daily. 9/12/17  Yes Williams Quintero MD   fluticasone (FLONASE) 50 mcg/actuation nasal spray 2 sprays to each nostril QD  Patient taking differently: 2 Sprays by Both Nostrils route daily. 2 sprays to each nostril QD 8/29/17  Yes Williams Quintero MD   lisinopril-hydroCHLOROthiazide (PRINZIDE, ZESTORETIC) 20-25 mg per tablet Take 1 Tab by mouth daily. 8/29/17  Yes Williams Quintero MD   potassium chloride SR (KLOR-CON 10) 10 mEq tablet Take 1 Tab by mouth daily. 8/29/17  Yes Williams Quintero MD   cpap machine kit by Does Not Apply route. 12 cm   Yes Historical Provider   acetaminophen (TYLENOL) 500 mg tablet Take 2 Tabs by mouth every six (6) hours. 10/10/17   Olegario Aburto MD   cyclobenzaprine (FLEXERIL) 10 mg tablet Take 0.5 Tabs by mouth three (3) times daily. Patient taking differently: Take 5 mg by mouth three (3) times daily as needed. 10/9/17   Olegario Aburto MD   HYDROmorphone (DILAUDID) 2 mg tablet Take 1 Tab by mouth every four (4) hours as needed. Max Daily Amount: 12 mg.   Patient not taking: Reported on 1/5/2018 10/9/17   Ambrosio Malin MD   ondansetron (ZOFRAN ODT) 8 mg disintegrating tablet Take 1 Tab by mouth every eight (8) hours as needed for Nausea. 10/9/17   Ambrosio Malin MD   senna-docusate (PERICOLACE) 8.6-50 mg per tablet Take 2 Tabs by mouth daily. Patient not taking: Reported on 1/5/2018 10/10/17   Ambrosio Malin MD   zolpidem (AMBIEN) 10 mg tablet Take 1 Tab by mouth nightly as needed for Sleep. Max Daily Amount: 10 mg. 10/9/17   Ambrosio Malin MD       Family History:     Family History   Problem Relation Age of Onset    Heart Disease Mother      CHF    Hypertension Mother     Cancer Father      lung    Hypertension Father     Hypertension Sister     Diabetes Brother     Stroke Brother     Hypertension Brother        Social History:      Social History   Substance Use Topics    Smoking status: Never Smoker    Smokeless tobacco: Never Used    Alcohol use 0.0 oz/week     1 - 2 Cans of beer per week      Comment: about 4 drinks every 2 weeks         Allergies: Allergies   Allergen Reactions    Codeine Itching        Vitals:     Visit Vitals    BP (!) 151/97 (BP 1 Location: Left arm, BP Patient Position: At rest)    Pulse 69    Temp 98 °F (36.7 °C)    Resp 16    Ht 6' 3\" (1.905 m)    Wt 112.5 kg (248 lb)    SpO2 100%    BMI 31 kg/m2         Objective:         General: No Acute distress                   HEENT: Normocephalic/atramatic                   Lungs:  Breathing non-labored                   Heart:   RRR                    Abdomen: soft       Extremities:  Prior incision benign. Pain with ROM of the right knee. Overall range of motion is 0-85 degrees. Distally the patient shows no neurologic deficit. Antalgic gait appreciated.      Meds:   Current Facility-Administered Medications   Medication Dose Route Frequency    lidocaine (XYLOCAINE) 10 mg/mL (1 %) injection 0.1 mL  0.1 mL SubCUTAneous PRN    lactated Ringers infusion  100 mL/hr IntraVENous CONTINUOUS    fentaNYL citrate (PF) injection 100 mcg  100 mcg IntraVENous ONCE    midazolam (VERSED) injection 2 mg  2 mg IntraVENous ONCE PRN    midazolam (VERSED) injection 2 mg  2 mg IntraVENous ONCE       Patient Active Problem List   Diagnosis Code    HNP (herniated nucleus pulposus) with myelopathy, cervical M50.00    Kidney stone N20.0    Deep vein thrombosis (DVT) of popliteal vein (HCC) I82.439    DDD (degenerative disc disease), cervical M50.30    Left inguinal hernia K40.90    MARILYN (obstructive sleep apnea) G47.33    Hypersomnia G47.10    Neck pain M54.2    Bilateral inguinal hernia without obstruction or gangrene K40.20    S/P hernia repair Z98.890, Z87.19    Cervical radiculopathy M54.12    Osteoarthritis of cervical spine M47.812    OA (osteoarthritis) of knee M17.10       Assessment:   1. Stiffness of the knee after arthroplasty    Plan:   Risks, benefits, alternatives and possible complications of manipulation of the left knee have been discussed with the patient. The patient has been given the opportunity to ask questions all of which have been answered and the patient wishes to proceed. The patient will be admitted the day of surgery for manipulation of the left knee under sedation.         Signed By: Gabe Eubanks MD  January 8, 2018

## 2018-01-08 NOTE — ANESTHESIA PROCEDURE NOTES
Peripheral Block    Start time: 1/8/2018 6:44 AM  End time: 1/8/2018 6:46 AM  Performed by: Ty Rausch  Authorized by: Ty Rausch       Pre-procedure: Indications: at surgeon's request and post-op pain management    Preanesthetic Checklist: patient identified, risks and benefits discussed, site marked, timeout performed, anesthesia consent given and patient being monitored    Timeout Time: 06:44          Block Type:   Block Type:   Adductor canal  Laterality:  Left  Monitoring:  Standard ASA monitoring, continuous pulse ox, frequent vital sign checks, heart rate, oxygen and responsive to questions  Injection Technique:  Single shot  Procedures: ultrasound guided    Patient Position: supine  Prep: chlorhexidine    Location:  Mid thigh  Needle Type:  Stimuplex  Needle Gauge:  21 G  Needle Localization:  Ultrasound guidance and anatomical landmarks  Medication Injected:  0.2%  ropivacaine  Volume (mL):  20    Assessment:  Number of attempts:  1  Injection Assessment:  Incremental injection every 5 mL, local visualized surrounding nerve on ultrasound, negative aspiration for blood, no paresthesia, no intravascular symptoms and ultrasound image on chart  Patient tolerance:  Patient tolerated the procedure well with no immediate complications

## 2018-01-08 NOTE — IP AVS SNAPSHOT
[address] [address] 
[phone] Patient: Luz Dumont MRN: [id number] :1965 About your hospitalization You were admitted on:  2018 You last received care in the:  [facility] PACU You were discharged on:  2018 Why you were hospitalized Your primary diagnosis was:  Not on File Follow-up Information Follow up With Details Comments Contact Info Ankit Zelaya MD  follow up with PT today  and Dr Manoj Jean as scheduled [address] 
[place] [address] 
[phone] Your Scheduled Appointments   9:40 AM EST  
CPAP with PP CPAP RESOURCE [address] ([facility]) [facility] [address] [place] [address] [facility]  
[phone] Discharge Orders None A check kavon indicates which time of day the medication should be taken. My Medications ASK your doctor about these medications Instructions Each Dose to Equal  
 Morning Noon Evening Bedtime  
 acetaminophen 500 mg tablet Commonly known as:  TYLENOL Your last dose was: Your next dose is: Take 2 Tabs by mouth every six (6) hours. 1000 mg  
    
   
   
   
  
 aspirin delayed-release 81 mg tablet Your last dose was: Your next dose is: Take 81 mg by mouth daily. 81 mg  
    
   
   
   
  
 celecoxib 200 mg capsule Commonly known as:  CELEBREX Your last dose was: Your next dose is: Take 1 Cap by mouth two (2) times a day for 90 days. 200 mg  
    
   
   
   
  
 cpap machine kit Your last dose was: Your next dose is:    
   
   
 by Does Not Apply route. 12 cm  
     
   
   
   
  
 cyclobenzaprine 10 mg tablet Commonly known as:  FLEXERIL Your last dose was: Your next dose is: Take 0.5 Tabs by mouth three (3) times daily. 5 mg  
    
   
   
   
  
 fluticasone 50 mcg/actuation nasal spray Commonly known as:  Gregory Anderson Your last dose was: Your next dose is:    
   
   
 2 sprays to each nostril QD  
     
   
   
   
  
 gabapentin 100 mg capsule Commonly known as:  NEURONTIN Your last dose was: Your next dose is: Take 1 Cap by mouth two (2) times a day. 100 mg HYDROmorphone 2 mg tablet Commonly known as:  DILAUDID Your last dose was: Your next dose is: Take 1 Tab by mouth every four (4) hours as needed. Max Daily Amount: 12 mg.  
 2 mg * lisinopril-hydroCHLOROthiazide 20-25 mg per tablet Commonly known as:  Umm Mathewsat Your last dose was: Your next dose is: Take 1 Tab by mouth daily. 1 Tab * lisinopril-hydroCHLOROthiazide 20-25 mg per tablet Commonly known as:  Umm Mathewsat Your last dose was: Your next dose is: TAKE 1 TABLET DAILY  
     
   
   
   
  
 metoprolol succinate 50 mg XL tablet Commonly known as:  TOPROL-XL Your last dose was: Your next dose is: Take 1 Tab by mouth daily. 50 mg  
    
   
   
   
  
 ondansetron 8 mg disintegrating tablet Commonly known as:  ZOFRAN ODT Your last dose was: Your next dose is: Take 1 Tab by mouth every eight (8) hours as needed for Nausea. 8 mg * potassium chloride SR 10 mEq tablet Commonly known as:  KLOR-CON 10 Your last dose was: Your next dose is: Take 1 Tab by mouth daily. 10 mEq * potassium chloride SR 10 mEq tablet Commonly known as:  KLOR-CON 10 Your last dose was: Your next dose is: TAKE 1 TABLET DAILY senna-docusate 8.6-50 mg per tablet Commonly known as:  Hoxie Kasey Your last dose was: Your next dose is: Take 2 Tabs by mouth daily. 2 Tab  
    
   
   
   
  
 zolpidem 10 mg tablet Commonly known as:  AMBIEN Your last dose was: Your next dose is: Take 1 Tab by mouth nightly as needed for Sleep. Max Daily Amount: 10 mg.  
 10 mg  
    
   
   
   
  
 * Notice: This list has 4 medication(s) that are the same as other medications prescribed for you. Read the directions carefully, and ask your doctor or other care provider to review them with you. Discharge Instructions Instructions Following Ambulatory Surgery Activity · As tolerated and directed by your doctor · Bathe or shower as directed by your doctor Diet · Clear liquids until no nausea or vomiting; then light diet for the first day · Advance to regular diet on second day, unless your doctor orders otherwise · If nausea and vomiting continues, call your doctor Pain · Take pain medication as directed by your doctor ·  Call your doctor if pain is NOT relieved by medication · Follow-Up Phone Calls · Will be made nursing staff · If you have any problems, call your doctor as needed Call your doctor if 
· Excessive bleeding that does not stop after holding mild pressure over the area · Temperature of 101 degrees F or above · Redness,excessive swelling or bruising, and/or green or yellow, smelly discharge from incision After Anesthesia · For the first 24 hours: DO NOT Drive, Drink alcoholic beverages, or Make important decisions · Be aware of dizziness following anesthesia and while taking pain medication Introducing [facility]! Dear Erika Garibay: Thank you for requesting a [employer] account. Our records indicate that you already have an active [employer] account.   You can access your account anytime at [email or web address] Did you know that you can access your hospital and ER discharge instructions at any time in [employer]? You can also review all of your test results from your hospital stay or ER visit. Additional Information If you have questions, please visit the Frequently Asked Questions section of the [employer] website at [email or web address] Remember, [employer] is NOT to be used for urgent needs. For medical emergencies, dial 911. Now available from your iPhone and Android! Providers Seen During Your Hospitalization Provider Specialty Primary office phone Susana Culver MD Orthopedic Surgery [phone] Your Primary Care Physician (PCP) Primary Care Physician Office Phone Office Fax Jay Peres [phone] You are allergic to the following Allergen Reactions Codeine Itching Recent Documentation Height Weight BMI Smoking Status 1.905 m 112.5 kg 31 kg/m2 Never Smoker Emergency Contacts Name Discharge Info Relation Home Work Mobile Jeannette Child DISCHARGE CAREGIVER [3] Spouse [3] [phone] Nancy Turner  Sister [23] [phone] Patient Belongings The following personal items are in your possession at time of discharge: 
  Dental Appliances: None  Visual Aid: None      Home Medications: None   Jewelry: None  Clothing: Footwear, Shirt, Undergarments, Pants, Jacket/Coat    Other Valuables: None Please provide this summary of care documentation to your next provider. Signatures-by signing, you are acknowledging that this After Visit Summary has been reviewed with you and you have received a copy. Patient Signature:  ____________________________________________________________ Date:  ____________________________________________________________  
  
Phyliss Ghee  Provider Signature: ____________________________________________________________ Date:  ____________________________________________________________

## 2018-02-19 ENCOUNTER — HOSPITAL ENCOUNTER (OUTPATIENT)
Dept: ULTRASOUND IMAGING | Age: 53
Discharge: HOME | End: 2018-02-19
Attending: FAMILY MEDICINE
Payer: COMMERCIAL

## 2018-02-19 DIAGNOSIS — M79.89 LEFT LEG SWELLING: ICD-10-CM

## 2018-02-19 PROCEDURE — 93971 EXTREMITY STUDY: CPT

## 2018-03-16 PROBLEM — F32.1 MODERATE SINGLE CURRENT EPISODE OF MAJOR DEPRESSIVE DISORDER (HCC): Status: ACTIVE | Noted: 2018-03-16

## 2018-09-26 ENCOUNTER — HOSPITAL ENCOUNTER (OUTPATIENT)
Dept: PHYSICAL THERAPY | Age: 53
Discharge: HOME | End: 2018-09-26
Payer: COMMERCIAL

## 2018-09-26 PROCEDURE — 97166 OT EVAL MOD COMPLEX 45 MIN: CPT

## 2018-09-26 PROCEDURE — 97140 MANUAL THERAPY 1/> REGIONS: CPT

## 2018-09-26 NOTE — PROGRESS NOTES
Ambulatory/Rehab Services H2 Model Falls Risk Assessment    Risk Factor Pts. ·   Confusion/Disorientation/Impulsivity  []    4 ·   Symptomatic Depression  []   2 ·   Altered Elimination  []   1 ·   Dizziness/Vertigo  []   1 ·   Gender (Male)  [x]   1 ·   Any administered antiepileptics (anticonvulsants):  []   2 ·   Any administered benzodiazepines:  []   1 ·   Visual Impairment (specify):  []   1 ·   Portable Oxygen Use  []   1 ·   Orthostatic ? BP  []   1 ·   History of Recent Falls (within 3 mos.)  []   5     Ability to Rise from Chair (choose one) Pts. ·   Ability to rise in a single movement  [x]   0 ·   Pushes up, successful in one attempt  []   1 ·   Multiple attempts, but successful  []   3 ·   Unable to rise without assistance  []   4   Total: (5 or greater = High Risk) 1     Falls Prevention Plan:   []                Physical Limitations to Exercise (specify):   []                Mobility Assistance Device (type):   []                Exercise/Equipment Adaptation (specify):    ©2010 [facility] [place] Patent #[id number].  Federal Law prohibits the replication, distribution or use without written permission from [facility] [employer]

## 2018-09-26 NOTE — THERAPY EVALUATION
Neva Beavers  : 1965  Primary: [facility]*  Secondary:  [facility] [address], [address]  [phone]   [phone]           OUTPATIENT OCCUPATIONAL THERAPY: Initial Assessment and Daily Note 2018    ICD-10: Treatment Diagnosis: I89.0 lymphedema not elsewhere specified, M79.605 pain in left leg, R26.2 difficulty in walking not elsewhere classified  Precautions/Allergies:   Codeine   Fall Risk Score: 1 (? 5 = High Risk)  MD Orders: eval and treat MEDICAL/REFERRING DIAGNOSIS:   Lymphedema, not elsewhere classified [I89.0]   DATE OF ONSET: 2017 following LTKA   REFERRING PHYSICIAN: Jono Fermin MD  RETURN PHYSICIAN APPOINTMENT: to be determined      INITIAL ASSESSMENT:  Mr. Margot Iglesias is a 52year old male referred to OT for the evaluation and treatment of bilateral LE lymphedema with greater involvement in the LLE. Swelling began in 2017 following LTKA. RLE swelling has improved with compression knee high - trace swelling today. Pt.'s LLE is significantly larger than his RLE with pitting edema present throughout from foot to inguinal crease. He tried wearing both knee and thigh high compression on the LLE with little to no success. Mr. Margot Iglesias has also had difficulty with his knee replacement even after PT and multiple knee manipulations. Left knee ROM is decreased and it \"locks on him' when he is walking. He states orthopedist says there is a build up of scar tissue but he is getting a second opinion from an orthopedic surgeon next week in [facility]. The dense, pitting edema in his left left is making it hard for him to walk, climb stairs and perform his regular activities of daily living. Prior to knee replacement he was a very active man working out/exercising on a regular basis, serving as a  and working full time.   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 He also had to go up a shoe size and has gained 35lbs with swelling in his abdomen and states she just \"feels miserable\". He would benefit from skilled OT for complete decongestive therapy to decrease lymphedema and allow pt to return to his regular ADL's. PLAN OF CARE:   PROBLEM LIST:  1. Increased Pain  2. Decreased Activity Tolerance  3. Decreased Flexibility/Joint Mobility  4. Edema/Girth INTERVENTIONS PLANNED  1. Skin care  2. Compression bandaging  3. Fitting for compression garment(s)  4. Manual therapy/Manual lymph drainage  5. Therapeutic exercise/Therapeutic activities  6. Patient Education  7. Compression pump trial prn  8.  kinesiotaping    TREATMENT PLAN:  Effective Dates: 9-26-18 TO 12-24-18. Frequency/Duration: 2 times a week for 90 days  2 x 8week x 90 days and upon reassessment. Will adjust frequency and duration as progress indicates. GOALS: (Goals have been discussed and agreed upon with patient.)  Short-Term Functional Goals: Time Frame: 45 days  1. The patient/caregiver will verbalize understanding of lymphedema precautions. 2. Patient will be independent with skin care regimen to decrease risk of cellulitis. 3. The patient/caregiver will be independent at donning and doffing left upper extremity compression bandages. 4. The patient/caregiver will be independent with self-manual lymph drainage techniques and show decrease in limb volume. 5. Patient will be independent in lymphatic exercises. Discharge Goals: Time Frame: 90 days  1. Patient's left lower extremity circumferential measurements will decrease on volumetric graph by 10-12cm to maximize functional use in ADL's and return to his prior level of activity. 2. The patient/caregiver will be independent with home management of lymphedema. 3. Patient/caregiver will be independent donning and doffing bilateral lower extremity compression garment.     Rehabilitation Potential For Stated Goals: Good  Regarding Karyle Corner Mahon's therapy, I certify that the treatment plan above will be carried out by a therapist or under their direction. Thank you for this referral,  Charlie Mauricio OT     Referring Physician Signature: Yady Cavanaugh MD _________________________  Date _________            The information in this section was collected on 9/26/2018 (except where otherwise noted). OCCUPATIONAL PROFILE & HISTORY:   History of Present Injury/Illness (Reason for Referral):  Mr. Bobette Dubin is a 52year old male referred to OT for the evaluation and treatment of bilateral LE lymphedema with greater involvement in the LLE. Swelling began in October 2017 following LTKA. RLE swelling has improved with compression knee high - trace swelling today. Pt.'s LLE is significantly larger than his RLE with pitting edema present throughout from foot to inguinal crease. He tried wearing both knee and thigh high compression on the LLE with little to no success. Mr. Bobette Dubin has also had difficulty with his knee replacement even after PT and multiple knee manipulations. Left knee ROM is decreased and it \"locks on him' when he is walking. He states orthopedist says there is a build up of scar tissue but he is getting a second opinion from an orthopedic surgeon next week in [facility]. The dense, pitting edema in his left left is making it hard for him to walk, climb stairs and perform his regular activities of daily living. Prior to knee replacement he was a very active man working out/exercising on a regular basis, serving as a  and working full time. 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 He also had to go up a shoe size and has gained 35lbs with swelling in his abdomen and states she just \"feels miserable\". He would benefit from skilled OT for complete decongestive therapy to decrease lymphedema and allow pt to return to his regular ADL's.   Past Medical History/Comorbidities:   Mr. Bobette Dubin has a past medical history of Adverse effect of anesthesia; BMI 31.0-31.9,adult; Chronic pain; DDD (degenerative disc disease), cervical (07/20/2016); GERD (gastroesophageal reflux disease); History of kidney stones; Hypertension; Moderate single current episode of major depressive disorder ([address] (3/16/2018); Nausea & vomiting; OA (osteoarthritis); PUD (peptic ulcer disease); Sleep apnea; Thromboembolus ([address] (2010); Thromboembolus ([address] (2007); and Vertigo. He also has no past medical history of Aneurysm ([address] Arrhythmia; Asthma; Autoimmune disease ([address] CAD (coronary artery disease); Cancer ([address] Chronic kidney disease; Chronic obstructive pulmonary disease ([address] Coagulation disorder ([address] Diabetes ([address] Difficult intubation; Endocarditis; Heart failure ([address] Ill-defined condition; Liver disease; Malignant hyperthermia due to anesthesia; Nicotine vapor product user; Non-nicotine vapor product user; Other ill-defined conditions(799.89); Pseudocholinesterase deficiency; Rheumatic fever; Seizures ([address] Stroke [facility]); Thyroid disease; or Unspecified adverse effect of anesthesia. Mr. Lorenzo Carter  has a past surgical history that includes hx appendectomy (as teen ); hx lap cholecystectomy (2011); hx cervical fusion (1998); hx lumbar laminectomy (1995); hx cervical diskectomy (2015); hx tonsillectomy (1993); hx hernia repair (Bilateral, 2016); hx septoplasty (2014); hx hernia repair; hx lithotripsy; hx lysis of adhesions (2012); hx knee arthroscopy (Left); hx knee replacement (Left, 10/09/2017); and hx orthopaedic (Left, 12/2017).   Social History/Living Environment:     He lives in two story house with his spouse and his having difficulty going up/down stairs since [place]  Prior Level of Function/Work/Activity:  He works full time as a [employer] supervisor and was a very active  but has not been able to return to this yet  Dominant Side:         RIGHT  Previous Treatment Approaches:          No therapy to date - he has tried compression knee and thighs highs with little success  Current Medications:    Current Outpatient Prescriptions:     amLODIPine (NORVASC) 2.5 mg tablet, Take 1 Tab by mouth daily. , Disp: 30 Tab, Rfl: 2    buPROPion XL (WELLBUTRIN XL) 150 mg tablet, Take 1 Tab by mouth every morning., Disp: 30 Tab, Rfl: 5    pantoprazole (PROTONIX) 40 mg tablet, Take 1 Tab by mouth daily. , Disp: 30 Tab, Rfl: 11    potassium chloride SR (KLOR-CON 10) 10 mEq tablet, Take 1 Tab by mouth daily. , Disp: 90 Tab, Rfl: 3    metoprolol succinate (TOPROL XL) 50 mg XL tablet, Take 1 Tab by mouth daily. , Disp: 90 Tab, Rfl: 3    lisinopril-hydroCHLOROthiazide (PRINZIDE, ZESTORETIC) 20-25 mg per tablet, Take 1 Tab by mouth daily. , Disp: 90 Tab, Rfl: 3    traZODone (DESYREL) 150 mg tablet, Take 1 Tab by mouth nightly., Disp: 30 Tab, Rfl: 11    escitalopram oxalate (LEXAPRO) 10 mg tablet, Take 1 Tab by mouth daily. , Disp: 30 Tab, Rfl: 11    aspirin delayed-release 81 mg tablet, Take 81 mg by mouth daily. , Disp: , Rfl:     potassium chloride SR (KLOR-CON 10) 10 mEq tablet, TAKE 1 TABLET DAILY, Disp: 90 Tab, Rfl: 3    fluticasone (FLONASE) 50 mcg/actuation nasal spray, 2 sprays to each nostril QD (Patient taking differently: 2 Sprays by Both Nostrils route daily. 2 sprays to each nostril QD), Disp: 3 Bottle, Rfl: 11    cpap machine kit, by Does Not Apply route.  12 cm, Disp: , Rfl:             Date Last Reviewed:  9/26/2018   Complexity Level : Expanded review of therapy/medical records (1-2):  MODERATE COMPLEXITY   ASSESSMENT OF OCCUPATIONAL PERFORMANCE:   Palpation:          RLE:  Trace swelling/reverses with elevation/fluid tissue is soft and mobile, maintained with knee high        LLE:  Dense swelling present from foot to inguinal crease with dorsal hump and moderate pitting edema throughout         Abdominal swelling - pt gained 35lbs without changing eating habits  ROM:          Decreased ROM in left knee following [place] in Oct 2017 - he has had PT and multiple knee manipulations - he has appt next week in [facility] for a second opinion   Strength:         Generalized decrease   Special Tests:          Stemmer sign positive   Skin Integrity:          Skin is intact. Integrity is good. He is using a daily moisturizer. 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  Activities of Daily Living : Modified  [place]  with decreased tolerance for LE activity and increased time needed - work activities are affected   Edema/Girth:  pitting   PRETREATMENT AFFECTED LIMB(s): lower extremity      Date:  9/26/18         Right / Left           Groin   []      []           8 inches   [x]      [x] 57/58          4 inches   [x]      [x] 48.5/50        PoplitealSpace   [x]      [x] 38/42          8 inches   [x]      [x] 43.5/45.5          4 inches   [x]      [x] 36/39.5          Ankle   [x]      [x] 26/29          Instep   [x]      [x] 25/26.5        Measurements are taken in centimeters:  2.54 cm = 1 inch    Total 274/290.5                       Physical Skills Involved:  1. Range of Motion  2. Strength  3. Activity Tolerance  4. Pain (Chronic)  5. Edema Cognitive Skills Affected (resulting in the inability to perform in a timely and safe manner):   Psychosocial Skills Affected:  1. Habits/Routines  2. Environmental Adaptation  3. Social Interaction   Number of elements that affect the Plan of Care: 3-5:  MODERATE COMPLEXITY   CLINICAL DECISION MAKING:   Outcome Measure: Tool Used: Tool Used: Lymphedema Life Impact Scale   Score:  Initial: 57 Most Recent: X (Date: -- )   Interpretation of Score: The Lymphedema Life Impact Scale (LLIS) is a validated instrument that measures the physical, functional, and psychosocial concerns pertinent to patients with extremity lymphedema.   The Scale's questionnaire is administered to patients to gauge impairments, activity limitations, and participation restrictions resulting from their lymphedema. Score 0 1-13 14-26 27-40 41-54 55-67 68   Modifier CH CI CJ CK CL CM CN     ? Other PT/OT Primary Functional Limitations:     - CURRENT STATUS: CM - 80%-99% impaired, limited or restricted    - GOAL STATUS: CL - 60%-79% impaired, limited or restricted    - D/C STATUS:  ---------------To be determined---------------       Medical Necessity:   · Skilled intervention continues to be required due to uncontrolled swelling of the LLE hindering pt.'s ADL's and putting him at risk for cellulitis. Reason for Services/Other Comments:  · Patient continues to require skilled intervention due to inability to self manage lymphedema at this time. Clinical Decision-Making Assessment:     Use of outcome tool(s) and clinical judgement create a POC that gives a: Questionable prediction of patient's progress: MODERATE COMPLEXITY   TREATMENT:   (In addition to Assessment/Re-Assessment sessions the following treatments were rendered)    Pre-treatment Symptoms/Complaints:  Trace RLE swelling, LLE swelling, pain, decreased knee mobility, decreased activity tolerance for LE activity, abdominal swelling   Pain: Initial:   Pain Intensity 1: 5  Post Session:  5   Occupational Therapy Treatments:    OT eval(x  ) OT eval was completed. Pt received information on lymphedema and risk reduction/self management practices as outlined by the National Lymphedema Network. Treatment was initiated with skin care and compression bandaging. Therapeutic Exercise :  Pt educated on LE exercises to promote lymph flow. Yannaann Gang HEP:  As above; handouts given to patient for all exercises. Manual Therapy:30min  Treatment was initiated with skin care and compression bandaging.             Manual Lymph Drainage:          Lymph Nodes:    Cervical Supraclavicular Axillary Abdominal Inguinal Popliteal Antecubital   RIGHT []     []     []     []     []     []     []       LEFT []     []     []     []     []     []     []          Anastamoses:   Axillo-axillary Inguino-inguinal Axillo-inguinal Inguino-axillary   ANTERIOR []     []     []     []       POSTERIOR []     []     []     []       RIGHT []     []     []     []       LEFT []     []     []     []         Limbs:   []    RUE     []    LUE     []    RLE    []    LLE   Compression: After skin care with Eucerin lotion, a multi layered compression bandage was applied to LLE from foot to inguinal crease. Pt is a good candidate for self bandaging so he was instructed on principle and techniques. He demonstrated good return of skills. He was instructed to remove bandages if any medical complications ie shortness of breath. He verbalized understanding. Treatment/Session Assessment:    · Response to Treatment:  Pt tolerated treatment session well with no medical complications. Skin was intact and addressed with eucerin lotion. His goal for therapy is to \"reduce the fluid in my leg\" so he can return to his normal active life ie exercising, yardwork, refereeing, etc. He has good support system in his spouse who can assist him at home with bandaging as needed. He agrees with treatment plan established today. · Compliance with Program/Exercises: Will assess as treatment progresses. · Recommendations/Intent for next treatment session: \"Next visit will focus on complete decongestive therapy and pt education\".   Total Treatment Duration:60min  OT Patient Time In/Time Out  Time In: 0900  Time Out: [address] [facility],[address], OT

## 2018-10-01 ENCOUNTER — HOSPITAL ENCOUNTER (OUTPATIENT)
Dept: PHYSICAL THERAPY | Age: 53
Discharge: HOME | End: 2018-10-01
Payer: COMMERCIAL

## 2018-10-03 ENCOUNTER — HOSPITAL ENCOUNTER (OUTPATIENT)
Dept: PHYSICAL THERAPY | Age: 53
Discharge: HOME | End: 2018-10-03
Payer: COMMERCIAL

## 2018-10-03 PROCEDURE — 97140 MANUAL THERAPY 1/> REGIONS: CPT

## 2018-10-03 NOTE — PROGRESS NOTES
Judge Quarles  : 1965  Primary: Silver Ibarra Olivia Millan*  Secondary:  [facility] [address] [facility]  [address] [place] [address], [address]  [phone]   [phone]           OUTPATIENT OCCUPATIONAL THERAPY: Daily Note 10/3/2018    ICD-10: Treatment Diagnosis: I89.0 lymphedema not elsewhere specified, M79.605 pain in left leg, R26.2 difficulty in walking not elsewhere classified  Precautions/Allergies:   Codeine   Fall Risk Score: 1 (? 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 Swelling began in 2017 following LTKA. RLE swelling has improved with compression knee high - trace swelling today. Pt.'s LLE is significantly larger than his RLE with pitting edema present throughout from foot to inguinal crease. He tried wearing both knee and thigh high compression on the LLE with little to no success. Mr. Governor Aaron has also had difficulty with his knee replacement even after PT and multiple knee manipulations. Left knee ROM is decreased and it \"locks on him' when he is walking. He states orthopedist says there is a build up of scar tissue but he is getting a second opinion from an orthopedic surgeon next week in [facility]. The dense, pitting edema in his left left is making it hard for him to walk, climb stairs and perform his regular activities of daily living. Prior to knee replacement he was a very active man working out/exercising on a regular basis, serving as a  and working full time. 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   He also had to go up a shoe size and has gained 35lbs with swelling in his abdomen and states she just \"feels miserable\". He would benefit from skilled OT for complete decongestive therapy to decrease lymphedema and allow pt to return to his regular ADL's. PLAN OF CARE:   PROBLEM LIST:  1. Increased Pain  2. Decreased Activity Tolerance  3. Decreased Flexibility/Joint Mobility  4. Edema/Girth INTERVENTIONS PLANNED  1. Skin care  2. Compression bandaging  3. Fitting for compression garment(s)  4. Manual therapy/Manual lymph drainage  5. Therapeutic exercise/Therapeutic activities  6. Patient Education  7. Compression pump trial prn  8.  kinesiotaping    TREATMENT PLAN:  Effective Dates: 9-26-18 TO 12-24-18. Frequency/Duration: 2 times a week for 90 days  2 x 8week x 90 days and upon reassessment. Will adjust frequency and duration as progress indicates. GOALS: (Goals have been discussed and agreed upon with patient.)  Short-Term Functional Goals: Time Frame: 45 days  1. The patient/caregiver will verbalize understanding of lymphedema precautions. 2. Patient will be independent with skin care regimen to decrease risk of cellulitis. 3. The patient/caregiver will be independent at donning and doffing left upper extremity compression bandages. 4. The patient/caregiver will be independent with self-manual lymph drainage techniques and show decrease in limb volume. 5. Patient will be independent in lymphatic exercises. Discharge Goals: Time Frame: 90 days  1. Patient's left lower extremity circumferential measurements will decrease on volumetric graph by 10-12cm to maximize functional use in ADL's and return to his prior level of activity. 2. The patient/caregiver will be independent with home management of lymphedema. 3. Patient/caregiver will be independent donning and doffing bilateral lower extremity compression garment.     Rehabilitation Potential For Stated Goals: Good  Regarding Diana Child's therapy, I certify that the treatment plan above will be carried out by a therapist or under their direction. Thank you for this referral,  Umberto Romero OT                 The information in this section was collected on 10/3/2018 (except where otherwise noted). OCCUPATIONAL PROFILE & HISTORY:   History of Present Injury/Illness (Reason for Referral):  Mr. Chance Peterson is a 52year old male referred to OT for the evaluation and treatment of bilateral LE lymphedema with greater involvement in the LLE. Swelling began in October 2017 following LTKA. RLE swelling has improved with compression knee high - trace swelling today. Pt.'s LLE is significantly larger than his RLE with pitting edema present throughout from foot to inguinal crease. He tried wearing both knee and thigh high compression on the LLE with little to no success. Mr. Chance Peterson has also had difficulty with his knee replacement even after PT and multiple knee manipulations. Left knee ROM is decreased and it \"locks on him' when he is walking. He states orthopedist says there is a build up of scar tissue but he is getting a second opinion from an orthopedic surgeon next week in [place]. The dense, pitting edema in his left left is making it hard for him to walk, climb stairs and perform his regular activities of daily living. Prior to knee replacement he was a very active man working out/exercising on a regular basis, serving as a  and working full time. 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 He also had to go up a shoe size and has gained 35lbs with swelling in his abdomen and states she just \"feels miserable\". He would benefit from skilled OT for complete decongestive therapy to decrease lymphedema and allow pt to return to his regular ADL's.   Past Medical History/Comorbidities:   Mr. Chance Peterson  has a past medical history of Adverse effect of anesthesia; BMI 31.0-31.9,adult; Chronic pain; DDD (degenerative disc disease), cervical (07/20/2016); GERD (gastroesophageal reflux disease); History of kidney stones; Hypertension; Moderate single current episode of major depressive disorder ([address] (3/16/2018); Nausea & vomiting; OA (osteoarthritis); PUD (peptic ulcer disease); Sleep apnea; Thromboembolus ([address] (2010); Thromboembolus ([address] (2007); and Vertigo. He also has no past medical history of Aneurysm ([address] Arrhythmia; Asthma; Autoimmune disease ([address] CAD (coronary artery disease); Cancer ([address] Chronic kidney disease; Chronic obstructive pulmonary disease ([address] Coagulation disorder ([address] Diabetes ([address] Difficult intubation; Endocarditis; Heart failure ([address] Ill-defined condition; Liver disease; Malignant hyperthermia due to anesthesia; Nicotine vapor product user; Non-nicotine vapor product user; Other ill-defined conditions(799.89); Pseudocholinesterase deficiency; Rheumatic fever; Seizures ([address] Stroke [facility]); Thyroid disease; or Unspecified adverse effect of anesthesia. Mr. Gala Vogel  has a past surgical history that includes hx appendectomy (as teen ); hx lap cholecystectomy (2011); hx cervical fusion (1998); hx lumbar laminectomy (1995); hx cervical diskectomy (2015); hx tonsillectomy (1993); hx hernia repair (Bilateral, 2016); hx septoplasty (2014); hx hernia repair; hx lithotripsy; hx lysis of adhesions (2012); hx knee arthroscopy (Left); hx knee replacement (Left, 10/09/2017); and hx orthopaedic (Left, 12/2017).   Social History/Living Environment:     He lives in two story house with his spouse and his having difficulty going up/down stairs since LTKA  Prior Level of Function/Work/Activity:  He works full time as a [employer] supervisor and was a very active  but has not been able to return to this yet  Dominant Side:         RIGHT  Previous Treatment Approaches:          No therapy to date - he has tried compression knee and thighs highs with little success  Current Medications:    Current Outpatient Prescriptions:     amLODIPine (NORVASC) 2.5 mg tablet, Take 1 Tab by mouth daily. , Disp: 30 Tab, Rfl: 2    buPROPion XL (WELLBUTRIN XL) 150 mg tablet, Take 1 Tab by mouth every morning., Disp: 30 Tab, Rfl: 5    pantoprazole (PROTONIX) 40 mg tablet, Take 1 Tab by mouth daily. , Disp: 30 Tab, Rfl: 11    potassium chloride SR (KLOR-CON 10) 10 mEq tablet, Take 1 Tab by mouth daily. , Disp: 90 Tab, Rfl: 3    metoprolol succinate (TOPROL XL) 50 mg XL tablet, Take 1 Tab by mouth daily. , Disp: 90 Tab, Rfl: 3    lisinopril-hydroCHLOROthiazide (PRINZIDE, ZESTORETIC) 20-25 mg per tablet, Take 1 Tab by mouth daily. , Disp: 90 Tab, Rfl: 3    traZODone (DESYREL) 150 mg tablet, Take 1 Tab by mouth nightly., Disp: 30 Tab, Rfl: 11    escitalopram oxalate (LEXAPRO) 10 mg tablet, Take 1 Tab by mouth daily. , Disp: 30 Tab, Rfl: 11    aspirin delayed-release 81 mg tablet, Take 81 mg by mouth daily. , Disp: , Rfl:     potassium chloride SR (KLOR-CON 10) 10 mEq tablet, TAKE 1 TABLET DAILY, Disp: 90 Tab, Rfl: 3    fluticasone (FLONASE) 50 mcg/actuation nasal spray, 2 sprays to each nostril QD (Patient taking differently: 2 Sprays by Both Nostrils route daily. 2 sprays to each nostril QD), Disp: 3 Bottle, Rfl: 11    cpap machine kit, by Does Not Apply route.  12 cm, Disp: , Rfl:             Date Last Reviewed:  10/3/2018   Complexity Level : Expanded review of therapy/medical records (1-2):  MODERATE COMPLEXITY   ASSESSMENT OF OCCUPATIONAL PERFORMANCE:   Palpation:          RLE:  Trace swelling/reverses with elevation/fluid tissue is soft and mobile, maintained with knee high        LLE:  Dense swelling present from foot to inguinal crease with dorsal hump and moderate pitting edema throughout         Abdominal swelling - pt gained 35lbs without changing eating habits  ROM:          Decreased ROM in left knee following LTKA in Oct 2017 - he has had PT and multiple knee manipulations - he has appt next week in [place] for a second opinion   Strength:         Generalized decrease   Special Tests:          Stemmer sign positive   Skin Integrity:          Skin is intact. Integrity is good. He is using a daily moisturizer. 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  Activities of Daily Living : Modified  [place]  with decreased tolerance for LE activity and increased time needed - work activities are affected   Edema/Girth:  pitting   PRETREATMENT AFFECTED LIMB(s): lower extremity      Date:  9/26/18         Right / Left           Groin   []      []           8 inches   [x]      [x] 57/58          4 inches   [x]      [x] 48.5/50        PoplitealSpace   [x]      [x] 38/42          8 inches   [x]      [x] 43.5/45.5          4 inches   [x]      [x] 36/39.5          Ankle   [x]      [x] 26/29          Instep   [x]      [x] 25/26.5        Measurements are taken in centimeters:  2.54 cm = 1 inch    Total 274/290.5                       Physical Skills Involved:  1. Range of Motion  2. Strength  3. Activity Tolerance  4. Pain (Chronic)  5. Edema Cognitive Skills Affected (resulting in the inability to perform in a timely and safe manner):   Psychosocial Skills Affected:  1. Habits/Routines  2. Environmental Adaptation  3. Social Interaction   Number of elements that affect the Plan of Care: 3-5:  MODERATE COMPLEXITY   CLINICAL DECISION MAKING:   Outcome Measure: Tool Used: Tool Used: Lymphedema Life Impact Scale   Score:  Initial: 57 Most Recent: X (Date: -- )   Interpretation of Score: The Lymphedema Life Impact Scale (LLIS) is a validated instrument that measures the physical, functional, and psychosocial concerns pertinent to patients with extremity lymphedema.   The Scale's questionnaire is administered to patients to gauge impairments, activity limitations, and participation restrictions resulting from their lymphedema. Score 0 1-13 14-26 27-40 41-54 55-67 68   Modifier CH CI CJ CK CL CM CN     ? Other PT/OT Primary Functional Limitations:     - CURRENT STATUS: CM - 80%-99% impaired, limited or restricted    - GOAL STATUS: CL - 60%-79% impaired, limited or restricted    - D/C STATUS:  ---------------To be determined---------------       Medical Necessity:   · Skilled intervention continues to be required due to uncontrolled swelling of the LLE hindering pt.'s ADL's and putting him at risk for cellulitis. Reason for Services/Other Comments:  · Patient continues to require skilled intervention due to inability to self manage lymphedema at this time. Clinical Decision-Making Assessment:     Use of outcome tool(s) and clinical judgement create a POC that gives a: Questionable prediction of patient's progress: MODERATE COMPLEXITY   TREATMENT:   (In addition to Assessment/Re-Assessment sessions the following treatments were rendered)    Pre-treatment Symptoms/Complaints:  Pt missed appt eatete this week due to work emergency. He tolerated bandages well but had difficulty keeping them in place due to active job. He has appt with orthopedist in [facility] Friday for second opinion on knee replacement. Pain: Initial:   Pain Intensity 1: 5  Post Session:  5   Occupational Therapy Treatments:    OT eval(x  ) OT eval was completed. Pt received information on lymphedema and risk reduction/self management practices as outlined by the National Lymphedema Network. Treatment was initiated with skin care and compression bandaging. Therapeutic Exercise :  Pt educated on LE exercises to promote lymph flow. Anthony Marquez HEP:  As above; handouts given to patient for all exercises. Manual Therapy:80min  Treatment was initiated with skin care, MLD, instruction on self MLD, education on LE exercises to promote lymph flow, measuring for [employer] and  compression bandaging.             Manual Lymph Drainage:          Lymph Nodes:    Cervical Supraclavicular Axillary Abdominal Inguinal Popliteal Antecubital   RIGHT []     [x]     []     [x]     [x]     [x]     []       LEFT []     [x]     [x]     [x]     [x]     [x]     []          Anastamoses:   Axillo-axillary Inguino-inguinal Axillo-inguinal Inguino-axillary   ANTERIOR []     [x]     []     [x]       POSTERIOR []     []     []     [x]       RIGHT []     []     []     []       LEFT []     []     []     [x]         Limbs:   []    RUE     []    LUE     []    RLE    [x]    LLE   Compression: Pt was measured for Jan Woody to wear at work since he is having difficulty keeping bandages in place. Order was placed with [facility]. After skin care with Eucerin lotion, a multi layered compression bandage was applied to LLE from foot to inguinal crease. Pt is able to adjust bandages at home as needed. Treatment/Session Assessment:    · Response to Treatment:  Pt tolerated treatment session well with no medical complications. Skin was intact and addressed with eucerin lotion. Pt was instructed on deep breathing and self MLD with visual and tactile demonstration and given handout for home. Pt.'s wife plans to come to therapy with him next week. · Compliance with Program/Exercises: compliant with home program   · Recommendations/Intent for next treatment session: \"Next visit will focus on complete decongestive therapy and pt education\".   Total Treatment Duration:80min  OT Patient Time In/Time Out  Time In: 1000  Time Out: [address], OT

## 2018-10-08 ENCOUNTER — HOSPITAL ENCOUNTER (OUTPATIENT)
Dept: PHYSICAL THERAPY | Age: 53
Discharge: HOME | End: 2018-10-08
Payer: COMMERCIAL

## 2018-10-08 PROCEDURE — 97140 MANUAL THERAPY 1/> REGIONS: CPT

## 2018-10-08 NOTE — PROGRESS NOTES
Leah Maki  : 1965  Primary: [facility]  Secondary:  [facility] at [address], [address]  [phone]   [phone]           OUTPATIENT OCCUPATIONAL THERAPY: Daily Note 10/8/2018    ICD-10: Treatment Diagnosis: I89.0 lymphedema not elsewhere specified, M79.605 pain in left leg, R26.2 difficulty in walking not elsewhere classified  Precautions/Allergies:   Codeine   Fall Risk Score: 1 (? 5 = High Risk)  MD Orders: eval and treat MEDICAL/REFERRING DIAGNOSIS:   Lymphedema, not elsewhere classified [I89.0]   DATE OF ONSET: 2017 following LTKA   REFERRING PHYSICIAN: Margi Rubi MD  RETURN PHYSICIAN APPOINTMENT: to be determined      INITIAL ASSESSMENT:  Mr. Katerin Andrade is a 52year old male referred to OT for the evaluation and treatment of bilateral LE lymphedema with greater involvement in the LLE. Swelling began in 2017 following LTKA. RLE swelling has improved with compression knee high - trace swelling today. Pt.'s LLE is significantly larger than his RLE with pitting edema present throughout from foot to inguinal crease. He tried wearing both knee and thigh high compression on the LLE with little to no success. Mr. Katerin Andrade has also had difficulty with his knee replacement even after PT and multiple knee manipulations. Left knee ROM is decreased and it \"locks on him' when he is walking. He states orthopedist says there is a build up of scar tissue but he is getting a second opinion from an orthopedic surgeon next week in [place]. The dense, pitting edema in his left left is making it hard for him to walk, climb stairs and perform his regular activities of daily living. Prior to knee replacement he was a very active man working out/exercising on a regular basis, serving as a  and working full time. 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   He also had to go up a shoe size and has gained 35lbs with swelling in his abdomen and states she just \"feels miserable\". He would benefit from skilled OT for complete decongestive therapy to decrease lymphedema and allow pt to return to his regular ADL's. PLAN OF CARE:   PROBLEM LIST:  1. Increased Pain  2. Decreased Activity Tolerance  3. Decreased Flexibility/Joint Mobility  4. Edema/Girth INTERVENTIONS PLANNED  1. Skin care  2. Compression bandaging  3. Fitting for compression garment(s)  4. Manual therapy/Manual lymph drainage  5. Therapeutic exercise/Therapeutic activities  6. Patient Education  7. Compression pump trial prn  8.  kinesiotaping    TREATMENT PLAN:  Effective Dates: 9-26-18 TO 12-24-18. Frequency/Duration: 2 times a week for 90 days  2 x 8week x 90 days and upon reassessment. Will adjust frequency and duration as progress indicates. GOALS: (Goals have been discussed and agreed upon with patient.)  Short-Term Functional Goals: Time Frame: 45 days  1. The patient/caregiver will verbalize understanding of lymphedema precautions. 2. Patient will be independent with skin care regimen to decrease risk of cellulitis. 3. The patient/caregiver will be independent at donning and doffing left upper extremity compression bandages. 4. The patient/caregiver will be independent with self-manual lymph drainage techniques and show decrease in limb volume. 5. Patient will be independent in lymphatic exercises. Discharge Goals: Time Frame: 90 days  1. Patient's left lower extremity circumferential measurements will decrease on volumetric graph by 10-12cm to maximize functional use in ADL's and return to his prior level of activity. 2. The patient/caregiver will be independent with home management of lymphedema. 3. Patient/caregiver will be independent donning and doffing bilateral lower extremity compression garment.     Rehabilitation Potential For Stated Goals: Good  Regarding Cydney Conrad Child's therapy, I certify that the treatment plan above will be carried out by a therapist or under their direction. Thank you for this referral,  Anthony Domingo OT                 The information in this section was collected on 10/8/2018 (except where otherwise noted). OCCUPATIONAL PROFILE & HISTORY:   History of Present Injury/Illness (Reason for Referral):  Mr. Mery Taylor is a 52year old male referred to OT for the evaluation and treatment of bilateral LE lymphedema with greater involvement in the LLE. Swelling began in October 2017 following LTKA. RLE swelling has improved with compression knee high - trace swelling today. Pt.'s LLE is significantly larger than his RLE with pitting edema present throughout from foot to inguinal crease. He tried wearing both knee and thigh high compression on the LLE with little to no success. Mr. Mery Taylor has also had difficulty with his knee replacement even after PT and multiple knee manipulations. Left knee ROM is decreased and it \"locks on him' when he is walking. He states orthopedist says there is a build up of scar tissue but he is getting a second opinion from an orthopedic surgeon next week in [facility]. The dense, pitting edema in his left left is making it hard for him to walk, climb stairs and perform his regular activities of daily living. Prior to knee replacement he was a very active man working out/exercising on a regular basis, serving as a  and working full time. 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 He also had to go up a shoe size and has gained 35lbs with swelling in his abdomen and states she just \"feels miserable\". He would benefit from skilled OT for complete decongestive therapy to decrease lymphedema and allow pt to return to his regular ADL's.   Past Medical History/Comorbidities:   Mr. Mery Taylor  has a past medical history of Adverse effect of anesthesia; BMI 31.0-31.9,adult; Chronic pain; DDD (degenerative disc disease), cervical (07/20/2016); GERD (gastroesophageal reflux disease); History of kidney stones; Hypertension; Moderate single current episode of major depressive disorder ([address] (3/16/2018); Nausea & vomiting; OA (osteoarthritis); PUD (peptic ulcer disease); Sleep apnea; Thromboembolus ([address] (2010); Thromboembolus ([address] (2007); and Vertigo. He also has no past medical history of Aneurysm ([address] Arrhythmia; Asthma; Autoimmune disease ([address] CAD (coronary artery disease); Cancer ([address] Chronic kidney disease; Chronic obstructive pulmonary disease ([address] Coagulation disorder ([address] Diabetes ([address] Difficult intubation; Endocarditis; Heart failure ([address] Ill-defined condition; Liver disease; Malignant hyperthermia due to anesthesia; Nicotine vapor product user; Non-nicotine vapor product user; Other ill-defined conditions(799.89); Pseudocholinesterase deficiency; Rheumatic fever; Seizures ([address] Stroke [facility]); Thyroid disease; or Unspecified adverse effect of anesthesia. Mr. Dejah Peña  has a past surgical history that includes hx appendectomy (as teen ); hx lap cholecystectomy (2011); hx cervical fusion (1998); hx lumbar laminectomy (1995); hx cervical diskectomy (2015); hx tonsillectomy (1993); hx hernia repair (Bilateral, 2016); hx septoplasty (2014); hx hernia repair; hx lithotripsy; hx lysis of adhesions (2012); hx knee arthroscopy (Left); hx knee replacement (Left, 10/09/2017); and hx orthopaedic (Left, 12/2017).   Social History/Living Environment:     He lives in two story house with his spouse and his having difficulty going up/down stairs since LTKA  Prior Level of Function/Work/Activity:  He works full time as a [employer] supervisor and was a very active  but has not been able to return to this yet  Dominant Side:         RIGHT  Previous Treatment Approaches:          No therapy to date - he has tried compression knee and thighs highs with little success  Current Medications:    Current Outpatient Prescriptions:     amLODIPine (NORVASC) 2.5 mg tablet, Take 1 Tab by mouth daily. , Disp: 30 Tab, Rfl: 2    buPROPion XL (WELLBUTRIN XL) 150 mg tablet, Take 1 Tab by mouth every morning., Disp: 30 Tab, Rfl: 5    pantoprazole (PROTONIX) 40 mg tablet, Take 1 Tab by mouth daily. , Disp: 30 Tab, Rfl: 11    potassium chloride SR (KLOR-CON 10) 10 mEq tablet, Take 1 Tab by mouth daily. , Disp: 90 Tab, Rfl: 3    metoprolol succinate (TOPROL XL) 50 mg XL tablet, Take 1 Tab by mouth daily. , Disp: 90 Tab, Rfl: 3    lisinopril-hydroCHLOROthiazide (PRINZIDE, ZESTORETIC) 20-25 mg per tablet, Take 1 Tab by mouth daily. , Disp: 90 Tab, Rfl: 3    traZODone (DESYREL) 150 mg tablet, Take 1 Tab by mouth nightly., Disp: 30 Tab, Rfl: 11    escitalopram oxalate (LEXAPRO) 10 mg tablet, Take 1 Tab by mouth daily. , Disp: 30 Tab, Rfl: 11    aspirin delayed-release 81 mg tablet, Take 81 mg by mouth daily. , Disp: , Rfl:     potassium chloride SR (KLOR-CON 10) 10 mEq tablet, TAKE 1 TABLET DAILY, Disp: 90 Tab, Rfl: 3    fluticasone (FLONASE) 50 mcg/actuation nasal spray, 2 sprays to each nostril QD (Patient taking differently: 2 Sprays by Both Nostrils route daily. 2 sprays to each nostril QD), Disp: 3 Bottle, Rfl: 11    cpap machine kit, by Does Not Apply route.  12 cm, Disp: , Rfl:             Date Last Reviewed:  10/8/2018   Complexity Level : Expanded review of therapy/medical records (1-2):  MODERATE COMPLEXITY   ASSESSMENT OF OCCUPATIONAL PERFORMANCE:   Palpation:          RLE:  Trace swelling/reverses with elevation/fluid tissue is soft and mobile, maintained with knee high        LLE:  Dense swelling present from foot to inguinal crease with dorsal hump and moderate pitting edema throughout         Abdominal swelling - pt gained 35lbs without changing eating habits  ROM:          Decreased ROM in left knee following LTKA in Oct 2017 - he has had PT and multiple knee manipulations - he has appt next week in [place] for a second opinion   Strength:         Generalized decrease   Special Tests:          Stemmer sign positive   Skin Integrity:          Skin is intact. Integrity is good. He is using a daily moisturizer. 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  Activities of Daily Living : Modified  [place]  with decreased tolerance for LE activity and increased time needed - work activities are affected   Edema/Girth:  pitting   PRETREATMENT AFFECTED LIMB(s): lower extremity      Date:  9/26/18         Right / Left           Groin   []      []           8 inches   [x]      [x] 57/58          4 inches   [x]      [x] 48.5/50        PoplitealSpace   [x]      [x] 38/42          8 inches   [x]      [x] 43.5/45.5          4 inches   [x]      [x] 36/39.5          Ankle   [x]      [x] 26/29          Instep   [x]      [x] 25/26.5        Measurements are taken in centimeters:  2.54 cm = 1 inch    Total 274/290.5                       Physical Skills Involved:  1. Range of Motion  2. Strength  3. Activity Tolerance  4. Pain (Chronic)  5. Edema Cognitive Skills Affected (resulting in the inability to perform in a timely and safe manner):   Psychosocial Skills Affected:  1. Habits/Routines  2. Environmental Adaptation  3. Social Interaction   Number of elements that affect the Plan of Care: 3-5:  MODERATE COMPLEXITY   CLINICAL DECISION MAKING:   Outcome Measure: Tool Used: Tool Used: Lymphedema Life Impact Scale   Score:  Initial: 57 Most Recent: X (Date: -- )   Interpretation of Score: The Lymphedema Life Impact Scale (LLIS) is a validated instrument that measures the physical, functional, and psychosocial concerns pertinent to patients with extremity lymphedema.   The Scale's questionnaire is administered to patients to gauge impairments, activity limitations, and participation restrictions resulting from their lymphedema. Score 0 1-13 14-26 27-40 41-54 55-67 68   Modifier CH CI CJ CK CL CM CN     ? Other PT/OT Primary Functional Limitations:     - CURRENT STATUS: CM - 80%-99% impaired, limited or restricted    - GOAL STATUS: CL - 60%-79% impaired, limited or restricted    - D/C STATUS:  ---------------To be determined---------------       Medical Necessity:   · Skilled intervention continues to be required due to uncontrolled swelling of the LLE hindering pt.'s ADL's and putting him at risk for cellulitis. Reason for Services/Other Comments:  · Patient continues to require skilled intervention due to inability to self manage lymphedema at this time. Clinical Decision-Making Assessment:     Use of outcome tool(s) and clinical judgement create a POC that gives a: Questionable prediction of patient's progress: MODERATE COMPLEXITY   TREATMENT:   (In addition to Assessment/Re-Assessment sessions the following treatments were rendered)    Pre-treatment Symptoms/Complaints:  Pt's wife came with him today to learn how to help pt with MLD and bandaging. Pain: Initial:   Pain Intensity 1: 5  Post Session:  4   Occupational Therapy Treatments:    OT eval(x  ) OT eval was completed. Pt received information on lymphedema and risk reduction/self management practices as outlined by the National Lymphedema Network. Treatment was initiated with skin care and compression bandaging. Therapeutic Exercise :  Pt educated on LE exercises to promote lymph flow. [facility] HEP:  As above; handouts given to patient for all exercises. Manual Therapy:60min  Pt received skin care, MLD, instruction on self MLD, education on LE exercises to promote lymph flow,   compression bandaging with education on self bandaging.            Manual Lymph Drainage:          Lymph Nodes:    Cervical Supraclavicular Axillary Abdominal Inguinal Popliteal Antecubital   RIGHT []     [x]     []     [x]     [x]     [x]     [] LEFT []     [x]     [x]     [x]     [x]     [x]     []          Anastamoses:   Axillo-axillary Inguino-inguinal Axillo-inguinal Inguino-axillary   ANTERIOR []     [x]     []     [x]       POSTERIOR []     []     []     [x]       RIGHT []     []     []     []       LEFT []     []     []     [x]         Limbs:   []    RUE     []    LUE     []    RLE    [x]    LLE   Compression:  After skin care with Eucerin lotion, a multi layered compression bandage was applied to LLE from foot to inguinal crease. Pt's wife was educated on principles and techniques of self bandaging so she can assist him at home. Treatment/Session Assessment:    · Response to Treatment:  Pt tolerated treatment session well with no medical complications. Skin was intact and addressed with eucerin lotion. Pt had second opinion regarding Annalisa Manriquez in [facility]. This new doctor recommended total revision of first replacement. Pt agreed and is waiting to get on surgical schedule. It will be important to reduce swelling before procedure for optimal surgical outcome. · Compliance with Program/Exercises: compliant with home program   · Recommendations/Intent for next treatment session: \"Next visit will focus on complete decongestive therapy and pt education\".   Total Treatment Duration:60min  OT Patient Time In/Time Out  Time In: 1200  Time Out: [address] [place], OT

## 2018-10-10 ENCOUNTER — HOSPITAL ENCOUNTER (OUTPATIENT)
Dept: PHYSICAL THERAPY | Age: 53
Discharge: HOME | End: 2018-10-10
Payer: COMMERCIAL

## 2018-10-10 PROCEDURE — 97140 MANUAL THERAPY 1/> REGIONS: CPT

## 2018-10-10 NOTE — PROGRESS NOTES
Serena Fish  : 1965  Primary: [facility]  Secondary:  [facility] [address] [facility]  [address] [place] [address], [address]  [phone]   [phone]           OUTPATIENT OCCUPATIONAL THERAPY: Daily Note 10/10/2018    ICD-10: Treatment Diagnosis: I89.0 lymphedema not elsewhere specified, M79.605 pain in left leg, R26.2 difficulty in walking not elsewhere classified  Precautions/Allergies:   Codeine   Fall Risk Score: 1 (? 5 = High Risk)  MD Orders: eval and treat MEDICAL/REFERRING DIAGNOSIS:   Lymphedema, not elsewhere classified [I89.0]   DATE OF ONSET: 2017 following LTKA   REFERRING PHYSICIAN: Rolan Gutierrez MD  RETURN PHYSICIAN APPOINTMENT: to be determined      INITIAL ASSESSMENT:  Mr. Gala Vogel is a 52year old male referred to OT for the evaluation and treatment of bilateral LE lymphedema with greater involvement in the LLE. Swelling began in 2017 following LTKA. RLE swelling has improved with compression knee high - trace swelling today. Pt.'s LLE is significantly larger than his RLE with pitting edema present throughout from foot to inguinal crease. He tried wearing both knee and thigh high compression on the LLE with little to no success. Mr. Gala Vogel has also had difficulty with his knee replacement even after PT and multiple knee manipulations. Left knee ROM is decreased and it \"locks on him' when he is walking. He states orthopedist says there is a build up of scar tissue but he is getting a second opinion from an orthopedic surgeon next week in [place]. The dense, pitting edema in his left left is making it hard for him to walk, climb stairs and perform his regular activities of daily living. Prior to knee replacement he was a very active man working out/exercising on a regular basis, serving as a  and working full time. 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   He also had to go up a shoe size and has gained 35lbs with swelling in his abdomen and states she just \"feels miserable\". He would benefit from skilled OT for complete decongestive therapy to decrease lymphedema and allow pt to return to his regular ADL's. PLAN OF CARE:   PROBLEM LIST:  1. Increased Pain  2. Decreased Activity Tolerance  3. Decreased Flexibility/Joint Mobility  4. Edema/Girth INTERVENTIONS PLANNED  1. Skin care  2. Compression bandaging  3. Fitting for compression garment(s)  4. Manual therapy/Manual lymph drainage  5. Therapeutic exercise/Therapeutic activities  6. Patient Education  7. Compression pump trial prn  8.  kinesiotaping    TREATMENT PLAN:  Effective Dates: 9-26-18 TO 12-24-18. Frequency/Duration: 2 times a week for 90 days  2 x 8week x 90 days and upon reassessment. Will adjust frequency and duration as progress indicates. GOALS: (Goals have been discussed and agreed upon with patient.)  Short-Term Functional Goals: Time Frame: 45 days  1. The patient/caregiver will verbalize understanding of lymphedema precautions. 2. Patient will be independent with skin care regimen to decrease risk of cellulitis. 3. The patient/caregiver will be independent at donning and doffing left upper extremity compression bandages. 4. The patient/caregiver will be independent with self-manual lymph drainage techniques and show decrease in limb volume. 5. Patient will be independent in lymphatic exercises. Discharge Goals: Time Frame: 90 days  1. Patient's left lower extremity circumferential measurements will decrease on volumetric graph by 10-12cm to maximize functional use in ADL's and return to his prior level of activity. 2. The patient/caregiver will be independent with home management of lymphedema. 3. Patient/caregiver will be independent donning and doffing bilateral lower extremity compression garment.     Rehabilitation Potential For Stated Goals: Good  Regarding Paulina Hall Danyell's therapy, I certify that the treatment plan above will be carried out by a therapist or under their direction. Thank you for this referral,  Dede Pastor OT                 The information in this section was collected on 10/10/2018 (except where otherwise noted). OCCUPATIONAL PROFILE & HISTORY:   History of Present Injury/Illness (Reason for Referral):  Mr. Tamiko Fisher is a 52year old male referred to OT for the evaluation and treatment of bilateral LE lymphedema with greater involvement in the LLE. Swelling began in October 2017 following LTKA. RLE swelling has improved with compression knee high - trace swelling today. Pt.'s LLE is significantly larger than his RLE with pitting edema present throughout from foot to inguinal crease. He tried wearing both knee and thigh high compression on the LLE with little to no success. Mr. Tamiko Fisher has also had difficulty with his knee replacement even after PT and multiple knee manipulations. Left knee ROM is decreased and it \"locks on him' when he is walking. He states orthopedist says there is a build up of scar tissue but he is getting a second opinion from an orthopedic surgeon next week in [facility]. The dense, pitting edema in his left left is making it hard for him to walk, climb stairs and perform his regular activities of daily living. Prior to knee replacement he was a very active man working out/exercising on a regular basis, serving as a  and working full time. 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 He also had to go up a shoe size and has gained 35lbs with swelling in his abdomen and states she just \"feels miserable\". He would benefit from skilled OT for complete decongestive therapy to decrease lymphedema and allow pt to return to his regular ADL's.   Past Medical History/Comorbidities:   Mr. Tamiko Fisher  has a past medical history of Adverse effect of anesthesia; BMI 31.0-31.9,adult; Chronic pain; DDD (degenerative disc disease), cervical (07/20/2016); GERD (gastroesophageal reflux disease); History of kidney stones; Hypertension; Moderate single current episode of major depressive disorder ([address] (3/16/2018); Nausea & vomiting; OA (osteoarthritis); PUD (peptic ulcer disease); Sleep apnea; Thromboembolus ([address] (2010); Thromboembolus ([address] (2007); and Vertigo. He also has no past medical history of Aneurysm ([address] Arrhythmia; Asthma; Autoimmune disease ([address] CAD (coronary artery disease); Cancer ([address] Chronic kidney disease; Chronic obstructive pulmonary disease ([address] Coagulation disorder ([address] Diabetes ([address] Difficult intubation; Endocarditis; Heart failure ([address] Ill-defined condition; Liver disease; Malignant hyperthermia due to anesthesia; Nicotine vapor product user; Non-nicotine vapor product user; Other ill-defined conditions(799.89); Pseudocholinesterase deficiency; Rheumatic fever; Seizures ([address] Stroke [facility]); Thyroid disease; or Unspecified adverse effect of anesthesia. Mr. Carlos Lee  has a past surgical history that includes hx appendectomy (as teen ); hx lap cholecystectomy (2011); hx cervical fusion (1998); hx lumbar laminectomy (1995); hx cervical diskectomy (2015); hx tonsillectomy (1993); hx hernia repair (Bilateral, 2016); hx septoplasty (2014); hx hernia repair; hx lithotripsy; hx lysis of adhesions (2012); hx knee arthroscopy (Left); hx knee replacement (Left, 10/09/2017); and hx orthopaedic (Left, 12/2017).   Social History/Living Environment:     He lives in two story house with his spouse and his having difficulty going up/down stairs since LTKA  Prior Level of Function/Work/Activity:  He works full time as a [employer] supervisor and was a very active  but has not been able to return to this yet  Dominant Side:         RIGHT  Previous Treatment Approaches:          No therapy to date - he has tried compression knee and thighs highs with little success  Current Medications:    Current Outpatient Prescriptions:     amLODIPine (NORVASC) 2.5 mg tablet, Take 1 Tab by mouth daily. , Disp: 30 Tab, Rfl: 2    buPROPion XL (WELLBUTRIN XL) 150 mg tablet, Take 1 Tab by mouth every morning., Disp: 30 Tab, Rfl: 5    pantoprazole (PROTONIX) 40 mg tablet, Take 1 Tab by mouth daily. , Disp: 30 Tab, Rfl: 11    potassium chloride SR (KLOR-CON 10) 10 mEq tablet, Take 1 Tab by mouth daily. , Disp: 90 Tab, Rfl: 3    metoprolol succinate (TOPROL XL) 50 mg XL tablet, Take 1 Tab by mouth daily. , Disp: 90 Tab, Rfl: 3    lisinopril-hydroCHLOROthiazide (PRINZIDE, ZESTORETIC) 20-25 mg per tablet, Take 1 Tab by mouth daily. , Disp: 90 Tab, Rfl: 3    traZODone (DESYREL) 150 mg tablet, Take 1 Tab by mouth nightly., Disp: 30 Tab, Rfl: 11    escitalopram oxalate (LEXAPRO) 10 mg tablet, Take 1 Tab by mouth daily. , Disp: 30 Tab, Rfl: 11    aspirin delayed-release 81 mg tablet, Take 81 mg by mouth daily. , Disp: , Rfl:     potassium chloride SR (KLOR-CON 10) 10 mEq tablet, TAKE 1 TABLET DAILY, Disp: 90 Tab, Rfl: 3    fluticasone (FLONASE) 50 mcg/actuation nasal spray, 2 sprays to each nostril QD (Patient taking differently: 2 Sprays by Both Nostrils route daily. 2 sprays to each nostril QD), Disp: 3 Bottle, Rfl: 11    cpap machine kit, by Does Not Apply route.  12 cm, Disp: , Rfl:             Date Last Reviewed:  10/10/2018   Complexity Level : Expanded review of therapy/medical records (1-2):  MODERATE COMPLEXITY   ASSESSMENT OF OCCUPATIONAL PERFORMANCE:   Palpation:          RLE:  Trace swelling/reverses with elevation/fluid tissue is soft and mobile, maintained with knee high        LLE:  Dense swelling present from foot to inguinal crease with dorsal hump and moderate pitting edema throughout         Abdominal swelling - pt gained 35lbs without changing eating habits  ROM:          Decreased ROM in left knee following LTKA in Oct 2017 - he has had PT and multiple knee manipulations - he has appt next week in [place] for a second opinion   Strength:         Generalized decrease   Special Tests:          Stemmer sign positive   Skin Integrity:          Skin is intact. Integrity is good. He is using a daily moisturizer. 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  Activities of Daily Living : Modified  [place]  with decreased tolerance for LE activity and increased time needed - work activities are affected   Edema/Girth:  pitting   PRETREATMENT AFFECTED LIMB(s): lower extremity      Date:  9/26/18 10/10/18        Right / Left           Groin   []      []  Left LE         8 inches   [x]      [x] 57/58 56.5         4 inches   [x]      [x] 48.5/50 49       PoplitealSpace   [x]      [x] 38/42 41         8 inches   [x]      [x] 43.5/45.5 40.5         4 inches   [x]      [x] 36/39.5 31         Ankle   [x]      [x] 26/29 25         Instep   [x]      [x] 25/26.5 24.5       Measurements are taken in centimeters:  2.54 cm = 1 inch    Total 274/290.5 267.5                      Physical Skills Involved:  1. Range of Motion  2. Strength  3. Activity Tolerance  4. Pain (Chronic)  5. Edema Cognitive Skills Affected (resulting in the inability to perform in a timely and safe manner):   Psychosocial Skills Affected:  1. Habits/Routines  2. Environmental Adaptation  3. Social Interaction   Number of elements that affect the Plan of Care: 3-5:  MODERATE COMPLEXITY   CLINICAL DECISION MAKING:   Outcome Measure: Tool Used: Tool Used: Lymphedema Life Impact Scale   Score:  Initial: 57 Most Recent: X (Date: -- )   Interpretation of Score: The Lymphedema Life Impact Scale (LLIS) is a validated instrument that measures the physical, functional, and psychosocial concerns pertinent to patients with extremity lymphedema.   The Scale's questionnaire is administered to patients to gauge impairments, activity limitations, and participation restrictions resulting from their lymphedema. Score 0 1-13 14-26 27-40 41-54 55-67 68   Modifier CH CI CJ CK CL CM CN     ? Other PT/OT Primary Functional Limitations:     - CURRENT STATUS: CM - 80%-99% impaired, limited or restricted    - GOAL STATUS: CL - 60%-79% impaired, limited or restricted    - D/C STATUS:  ---------------To be determined---------------       Medical Necessity:   · Skilled intervention continues to be required due to uncontrolled swelling of the LLE hindering pt.'s ADL's and putting him at risk for cellulitis. Reason for Services/Other Comments:  · Patient continues to require skilled intervention due to inability to self manage lymphedema at this time. Clinical Decision-Making Assessment:     Use of outcome tool(s) and clinical judgement create a POC that gives a: Questionable prediction of patient's progress: MODERATE COMPLEXITY   TREATMENT:   (In addition to Assessment/Re-Assessment sessions the following treatments were rendered)    Pre-treatment Symptoms/Complaints: Overall LLE limb volume has decreased by 23cm and pt states LLE does not feel as heavy or tight anymore. He is picking up Abad Riddles today and will begin wearing on LLE and bandaging prn. Circumferential measurements from knee to foot in the LLE are now smaller than the RLE leading me to believe there is more swelling in the RLE than originally thought. Pt will begin bandaging RLE and we will measure again next week. Abdominal swelling still persists and pt will consider compression options. Pain: Initial:   Pain Intensity 1: 5  Post Session:  4   Occupational Therapy Treatments:    OT eval(x  ) OT eval was completed. Pt received information on lymphedema and risk reduction/self management practices as outlined by the National Lymphedema Network. Treatment was initiated with skin care and compression bandaging.     Therapeutic Exercise :  Pt educated on LE exercises to promote lymph flow. Chloé El HEP:  As above; handouts given to patient for all exercises. Manual Therapy:60min  Pt received skin care, MLD, instruction on self MLD, education on LE exercises to promote lymph flow,   compression bandaging and  education on new [employer] he will  later today using sample in clinic          Manual Lymph Drainage:RLE added to MLD treatment           Lymph Nodes:    Cervical Supraclavicular Axillary Abdominal Inguinal Popliteal Antecubital   RIGHT []     [x]     [x]     [x]     [x]     [x]     []       LEFT []     [x]     [x]     [x]     [x]     [x]     []          Anastamoses:   Axillo-axillary Inguino-inguinal Axillo-inguinal Inguino-axillary   ANTERIOR []     [x]     []     [x]       POSTERIOR []     []     []     [x]       RIGHT []     []     []     []       LEFT []     []     []     [x]         Limbs:   []    RUE     []    LUE     [x]    RLE    [x]    LLE   Compression:  After skin care with Eucerin lotion, a multi layered compression bandage was applied to RLE and LLE from foot to knee. Pt wearing compression shorts for compression on the thigh and will consider abdominal compression options. He received education on donning/doffing, wearing schedule and care of [employer] using sample in clinic so he will be ready to wear when he picks it up today. Treatment/Session Assessment:    · Response to Treatment:  Pt tolerated treatment session well with no medical complications. Skin was intact and addressed with eucerin lotion. Pt is making excellent progress and he is very pleased and compliant with treatment plan. · Compliance with Program/Exercises: compliant with home program   · Recommendations/Intent for next treatment session: \"Next visit will focus on complete decongestive therapy and pt education\".   Total Treatment Duration:60min  OT Patient Time In/Time Out  Time In: 1200  Time Out: [facility], OT

## 2018-10-15 ENCOUNTER — HOSPITAL ENCOUNTER (OUTPATIENT)
Dept: PHYSICAL THERAPY | Age: 53
Discharge: HOME | End: 2018-10-15
Payer: COMMERCIAL

## 2018-10-15 NOTE — PROGRESS NOTES
Sloan Fraser  : 1965  Primary: [facility]  Secondary:  [facility] [address], [facility] [address]  [phone]   [phone]      OUTPATIENT DAILY NOTE    NAME/AGE/GENDER: Sloan Fraser is a 52 y.o. male. DATE: 10/15/2018    Patient canceled for appointment today due to work conflict. Will plan to follow up on next scheduled visit.     Anthony Domingo, OT

## 2018-10-19 ENCOUNTER — HOSPITAL ENCOUNTER (OUTPATIENT)
Dept: PHYSICAL THERAPY | Age: 53
Discharge: HOME | End: 2018-10-19
Payer: COMMERCIAL

## 2018-10-19 PROCEDURE — 97140 MANUAL THERAPY 1/> REGIONS: CPT

## 2018-10-19 NOTE — PROGRESS NOTES
Jeovany Lomas  : 1965  Primary: [facility]  Secondary:  [facility]  [address], [facility], [address]  [phone]   [id number] [id number]           OUTPATIENT OCCUPATIONAL THERAPY: Daily Note 10/19/2018    ICD-10: Treatment Diagnosis: I89.0 lymphedema not elsewhere specified, M79.605 pain in left leg, R26.2 difficulty in walking not elsewhere classified  Precautions/Allergies:   Codeine   Fall Risk Score: 1 (? 5 = High Risk)  MD Orders: eval and treat MEDICAL/REFERRING DIAGNOSIS:   Lymphedema, not elsewhere classified [I89.0]   DATE OF ONSET: 2017 following LTKA   REFERRING PHYSICIAN: Marybeth Baig MD  RETURN PHYSICIAN APPOINTMENT: to be determined      INITIAL ASSESSMENT:  Mr. Brad Vail is a 52year old male referred to OT for the evaluation and treatment of bilateral LE lymphedema with greater involvement in the LLE. Swelling began in 2017 following LTKA. RLE swelling has improved with compression knee high - trace swelling today. Pt.'s LLE is significantly larger than his RLE with pitting edema present throughout from foot to inguinal crease. He tried wearing both knee and thigh high compression on the LLE with little to no success. Mr. Brad Vail has also had difficulty with his knee replacement even after PT and multiple knee manipulations. Left knee ROM is decreased and it \"locks on him' when he is walking. He states orthopedist says there is a build up of scar tissue but he is getting a second opinion from an orthopedic surgeon next week in [place]. The dense, pitting edema in his left left is making it hard for him to walk, climb stairs and perform his regular activities of daily living. Prior to knee replacement he was a very active man working out/exercising on a regular basis, serving as a  and working full time. 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   He also had to go up a shoe size and has gained 35lbs with swelling in his abdomen and states she just \"feels miserable\". He would benefit from skilled OT for complete decongestive therapy to decrease lymphedema and allow pt to return to his regular ADL's. PLAN OF CARE:   PROBLEM LIST:  1. Increased Pain  2. Decreased Activity Tolerance  3. Decreased Flexibility/Joint Mobility  4. Edema/Girth INTERVENTIONS PLANNED  1. Skin care  2. Compression bandaging  3. Fitting for compression garment(s)  4. Manual therapy/Manual lymph drainage  5. Therapeutic exercise/Therapeutic activities  6. Patient Education  7. Compression pump trial prn  8.  kinesiotaping    TREATMENT PLAN:  Effective Dates: 9-26-18 TO 12-24-18. Frequency/Duration: 2 times a week for 90 days  2 x 8week x 90 days and upon reassessment. Will adjust frequency and duration as progress indicates. GOALS: (Goals have been discussed and agreed upon with patient.)  Short-Term Functional Goals: Time Frame: 45 days  1. The patient/caregiver will verbalize understanding of lymphedema precautions. 2. Patient will be independent with skin care regimen to decrease risk of cellulitis. 3. The patient/caregiver will be independent at donning and doffing left upper extremity compression bandages. 4. The patient/caregiver will be independent with self-manual lymph drainage techniques and show decrease in limb volume. 5. Patient will be independent in lymphatic exercises. Discharge Goals: Time Frame: 90 days  1. Patient's left lower extremity circumferential measurements will decrease on volumetric graph by 10-12cm to maximize functional use in ADL's and return to his prior level of activity. 2. The patient/caregiver will be independent with home management of lymphedema. 3. Patient/caregiver will be independent donning and doffing bilateral lower extremity compression garment.     Rehabilitation Potential For Stated Goals: Good  Regarding Bao Jackson Danyell's therapy, I certify that the treatment plan above will be carried out by a therapist or under their direction. Thank you for this referral,  Teresa Valdes OT                 The information in this section was collected on 10/19/2018 (except where otherwise noted). OCCUPATIONAL PROFILE & HISTORY:   History of Present Injury/Illness (Reason for Referral):  Mr. Katerin Andrade is a 52year old male referred to OT for the evaluation and treatment of bilateral LE lymphedema with greater involvement in the LLE. Swelling began in October 2017 following LTKA. RLE swelling has improved with compression knee high - trace swelling today. Pt.'s LLE is significantly larger than his RLE with pitting edema present throughout from foot to inguinal crease. He tried wearing both knee and thigh high compression on the LLE with little to no success. Mr. Katerin Andrade has also had difficulty with his knee replacement even after PT and multiple knee manipulations. Left knee ROM is decreased and it \"locks on him' when he is walking. He states orthopedist says there is a build up of scar tissue but he is getting a second opinion from an orthopedic surgeon next week in [place]. The dense, pitting edema in his left left is making it hard for him to walk, climb stairs and perform his regular activities of daily living. Prior to knee replacement he was a very active man working out/exercising on a regular basis, serving as a  and working full time. 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 He also had to go up a shoe size and has gained 35lbs with swelling in his abdomen and states she just \"feels miserable\". He would benefit from skilled OT for complete decongestive therapy to decrease lymphedema and allow pt to return to his regular ADL's.   Past Medical History/Comorbidities:   Mr. Katerin Andrade  has a past medical history of Adverse effect of anesthesia, BMI 31.0-31.9,adult, Chronic pain, DDD (degenerative disc disease), cervical, GERD (gastroesophageal reflux disease), History of kidney stones, Hypertension, Moderate single current episode of major depressive disorder ([facility] [address] Nausea & vomiting, OA (osteoarthritis), PUD (peptic ulcer disease), Sleep apnea, Thromboembolus ([address] Thromboembolus ([address] and Vertigo. Mr. Rivas Preselsa  has a past surgical history that includes hx appendectomy (as teen ); hx lap cholecystectomy (2011); hx cervical fusion (1998); hx lumbar laminectomy (1995); hx cervical diskectomy (2015); hx tonsillectomy (1993); hx hernia repair (Bilateral, 2016); hx septoplasty (2014); hx hernia repair; hx lithotripsy; hx lysis of adhesions (2012); hx knee arthroscopy (Left); hx knee replacement (Left, 10/09/2017); hx orthopaedic (Left, 12/2017); MANIPULATION LEFT KNEE (Left, 1/8/2018); MANIPULATION LEFT KNEE (Left, 12/11/2017); LEFT KNEE ARTHROPLASTY TOTAL/ WESLY (Left, 10/9/2017); BILATERAL HERNIA INGUINAL REPAIR LAPAROSCOPIC (Bilateral, 8/19/2016); SEPTOPLASTY (N/A, 5/16/2014); SPINE ANTERIOR CERVICAL DISCECTOMY FUSION WITH BONE BANK BONE (N/A, 7/22/2013); and CHOLECYSTECTOMY LAPAROSCOPIC (N/A, 2/3/2012). Social History/Living Environment:     He lives in two story house with his spouse and his having difficulty going up/down stairs since LTKA  Prior Level of Function/Work/Activity:  He works full time as a [employer] supervisor and was a very active  but has not been able to return to this yet  Dominant Side:         RIGHT  Previous Treatment Approaches:          No therapy to date - he has tried compression knee and thighs highs with little success  Current Medications:    Current Outpatient Medications:     amLODIPine (NORVASC) 2.5 mg tablet, Take 1 Tab by mouth daily. , Disp: 30 Tab, Rfl: 2    metoprolol succinate (TOPROL XL) 50 mg XL tablet, Take 1 Tab by mouth daily. , Disp: 90 Tab, Rfl: 3    lisinopril-hydroCHLOROthiazide (PRINZIDE, ZESTORETIC) 20-25 mg per tablet, Take 1 Tab by mouth daily., Disp: 90 Tab, Rfl: 3    potassium chloride SR (KLOR-CON 10) 10 mEq tablet, Take 1 Tab by mouth daily. , Disp: 90 Tab, Rfl: 3    nebivolol (BYSTOLIC) 20 mg tablet, Take 1 Tab by mouth daily. , Disp: 30 Tab, Rfl: 6    furosemide (LASIX) 40 mg tablet, Take 1 Tab by mouth daily. , Disp: 90 Tab, Rfl: 3    buPROPion XL (WELLBUTRIN XL) 150 mg tablet, Take 1 Tab by mouth every morning., Disp: 30 Tab, Rfl: 5    traZODone (DESYREL) 150 mg tablet, Take 1 Tab by mouth nightly., Disp: 30 Tab, Rfl: 11    escitalopram oxalate (LEXAPRO) 10 mg tablet, Take 1 Tab by mouth daily. , Disp: 30 Tab, Rfl: 11    aspirin delayed-release 81 mg tablet, Take 81 mg by mouth daily. , Disp: , Rfl:     fluticasone (FLONASE) 50 mcg/actuation nasal spray, 2 sprays to each nostril QD (Patient taking differently: 2 Sprays by Both Nostrils route daily. 2 sprays to each nostril QD), Disp: 3 Bottle, Rfl: 11    cpap machine kit, by Does Not Apply route. 12 cm, Disp: , Rfl:             Date Last Reviewed:  10/19/2018   Complexity Level : Expanded review of therapy/medical records (1-2):  MODERATE COMPLEXITY   ASSESSMENT OF OCCUPATIONAL PERFORMANCE:   Palpation:          RLE:  Trace swelling/reverses with elevation/fluid tissue is soft and mobile, maintained with knee high        LLE:  Dense swelling present from foot to inguinal crease with dorsal hump and moderate pitting edema throughout         Abdominal swelling - pt gained 35lbs without changing eating habits  ROM:          Decreased ROM in left knee following LTKA in Oct 2017 - he has had PT and multiple knee manipulations - he has appt next week in [place] for a second opinion   Strength:         Generalized decrease   Special Tests:          Stemmer sign positive   Skin Integrity:          Skin is intact. Integrity is good. He is using a daily moisturizer.    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5 24.5       Measurements are taken in centimeters:  2.54 cm = 1 inch    Total 274/290.5 267.5                      Physical Skills Involved:  1. Range of Motion  2. Strength  3. Activity Tolerance  4. Pain (Chronic)  5. Edema Cognitive Skills Affected (resulting in the inability to perform in a timely and safe manner):   Psychosocial Skills Affected:  1. Habits/Routines  2. Environmental Adaptation  3. Social Interaction   Number of elements that affect the Plan of Care: 3-5:  MODERATE COMPLEXITY   CLINICAL DECISION MAKING:   Outcome Measure: Tool Used: Tool Used: Lymphedema Life Impact Scale   Score:  Initial: 57 Most Recent: X (Date: -- )   Interpretation of Score: The Lymphedema Life Impact Scale (LLIS) is a validated instrument that measures the physical, functional, and psychosocial concerns pertinent to patients with extremity lymphedema. The Scale's questionnaire is administered to patients to gauge impairments, activity limitations, and participation restrictions resulting from their lymphedema. Score 0 1-13 14-26 27-40 41-54 55-67 68   Modifier CH CI CJ CK CL CM CN     ?  Other PT/OT Primary Functional Limitations:     - CURRENT STATUS: CM - 80%-99% impaired, limited or restricted    - GOAL STATUS: CL - 60%-79% impaired, limited or restricted    - D/C STATUS:  ---------------To be determined---------------       Medical Necessity:   · Skilled intervention continues to be required due to uncontrolled swelling of the LLE hindering pt.'s ADL's and putting him at risk for cellulitis. Reason for Services/Other Comments:  · Patient continues to require skilled intervention due to inability to self manage lymphedema at this time. Clinical Decision-Making Assessment:     Use of outcome tool(s) and clinical judgement create a POC that gives a: Questionable prediction of patient's progress: MODERATE COMPLEXITY   TREATMENT:   (In addition to Assessment/Re-Assessment sessions the following treatments were rendered)    Pre-treatment Symptoms/Complaints: Overall LLE limb volume has decreased by 23cm and pt states LLE does not feel as heavy or tight anymore. Pt is wearing Tata Álvaro daily. Pt is going to look into bike shorts for upper leg and abdominal swelling. Pain: Initial:   Pain Intensity 1: 5  Post Session:  4   Occupational Therapy Treatments:    OT eval(x  ) OT eval was completed. Pt received information on lymphedema and risk reduction/self management practices as outlined by the National Lymp[employer] Network. Treatment was initiated with skin care and compression bandaging. Therapeutic Exercise :  Pt educated on LE exercises to promote lymph flow. Chin Mosley HEP:  As above; handouts given to patient for all exercises. Manual Therapy:45 min  Pt received skin care, MLD, instruction on self MLD, and compression pump trial x 20 minutes.             Manual Lymph Drainage:          Lymph Nodes:    Cervical Supraclavicular Axillary Abdominal Inguinal Popliteal Antecubital   RIGHT [x]     [x]     []     []     []     []     []       LEFT [x]     [x]     [x]     [x]     [x]     [x]     []          Anastamoses:   Axillo-axillary Inguino-inguinal Axillo-inguinal Inguino-axillary   ANTERIOR []     [x]     []     [x]       POSTERIOR []     []     [] []       RIGHT []     []     []     []       LEFT []     []     []     [x]         Limbs:   []    RUE     []    LUE     []    RLE    [x]    LLE   Compression:  After skin care with Eucerin lotion, applied Farrow wrap L LE. Treatment/Session Assessment:    · Response to Treatment:  Pt tolerated treatment session well with no medical complications. Skin was intact and addressed with eucerin lotion. Pt is making excellent progress and he is very pleased and compliant with treatment plan. · Compliance with Program/Exercises: compliant with home program   · Recommendations/Intent for next treatment session: \"Next visit will focus on complete decongestive therapy and pt education\".   Total Treatment Duration:45min  OT Patient Time In/Time Out  Time In: 1115  Time Out: [address], OT

## 2018-10-23 ENCOUNTER — HOSPITAL ENCOUNTER (OUTPATIENT)
Dept: PHYSICAL THERAPY | Age: 53
Discharge: HOME | End: 2018-10-23
Payer: COMMERCIAL

## 2018-10-23 PROCEDURE — 97140 MANUAL THERAPY 1/> REGIONS: CPT

## 2018-10-23 NOTE — PROGRESS NOTES
Hanna Hand  : 1965  Primary: [facility]  Secondary:  [facility] at [address], [address]  [phone]   [phone]           OUTPATIENT OCCUPATIONAL THERAPY: Daily Note 10/23/2018    ICD-10: Treatment Diagnosis: I89.0 lymphedema not elsewhere specified, M79.605 pain in left leg, R26.2 difficulty in walking not elsewhere classified  Precautions/Allergies:   Codeine   Fall Risk Score: 1 (? 5 = High Risk)  MD Orders: eval and treat MEDICAL/REFERRING DIAGNOSIS:   Lymphedema, not elsewhere classified [I89.0]   DATE OF ONSET: 2017 following LTKA   REFERRING PHYSICIAN: Edis Knowles MD  RETURN PHYSICIAN APPOINTMENT: to be determined      INITIAL ASSESSMENT:  Mr. Chance Peterson is a 52year old male referred to OT for the evaluation and treatment of bilateral LE lymphedema with greater involvement in the LLE. Swelling began in 2017 following LTKA. RLE swelling has improved with compression knee high - trace swelling today. Pt.'s LLE is significantly larger than his RLE with pitting edema present throughout from foot to inguinal crease. He tried wearing both knee and thigh high compression on the LLE with little to no success. Mr. Chance Peterson has also had difficulty with his knee replacement even after PT and multiple knee manipulations. Left knee ROM is decreased and it \"locks on him' when he is walking. He states orthopedist says there is a build up of scar tissue but he is getting a second opinion from an orthopedic surgeon next week in [place]. The dense, pitting edema in his left left is making it hard for him to walk, climb stairs and perform his regular activities of daily living. Prior to knee replacement he was a very active man working out/exercising on a regular basis, serving as a  and working full time. 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   He also had to go up a shoe size and has gained 35lbs with swelling in his abdomen and states she just \"feels miserable\". He would benefit from skilled OT for complete decongestive therapy to decrease lymphedema and allow pt to return to his regular ADL's. PLAN OF CARE:   PROBLEM LIST:  1. Increased Pain  2. Decreased Activity Tolerance  3. Decreased Flexibility/Joint Mobility  4. Edema/Girth INTERVENTIONS PLANNED  1. Skin care  2. Compression bandaging  3. Fitting for compression garment(s)  4. Manual therapy/Manual lymph drainage  5. Therapeutic exercise/Therapeutic activities  6. Patient Education  7. Compression pump trial prn  8.  kinesiotaping    TREATMENT PLAN:  Effective Dates: 9-26-18 TO 12-24-18. Frequency/Duration: 2 times a week for 90 days  2 x 8week x 90 days and upon reassessment. Will adjust frequency and duration as progress indicates. GOALS: (Goals have been discussed and agreed upon with patient.)  Short-Term Functional Goals: Time Frame: 45 days  1. The patient/caregiver will verbalize understanding of lymphedema precautions. 2. Patient will be independent with skin care regimen to decrease risk of cellulitis. 3. The patient/caregiver will be independent at donning and doffing left upper extremity compression bandages. 4. The patient/caregiver will be independent with self-manual lymph drainage techniques and show decrease in limb volume. 5. Patient will be independent in lymphatic exercises. Discharge Goals: Time Frame: 90 days  1. Patient's left lower extremity circumferential measurements will decrease on volumetric graph by 10-12cm to maximize functional use in ADL's and return to his prior level of activity. 2. The patient/caregiver will be independent with home management of lymphedema. 3. Patient/caregiver will be independent donning and doffing bilateral lower extremity compression garment.     Rehabilitation Potential For Stated Goals: Good  Regarding Jacqui Pollock Danyell's therapy, I certify that the treatment plan above will be carried out by a therapist or under their direction. Thank you for this referral,  Milena Sewell OT                 The information in this section was collected on 10/23/2018 (except where otherwise noted). OCCUPATIONAL PROFILE & HISTORY:   History of Present Injury/Illness (Reason for Referral):  Mr. Dee Galvez is a 52year old male referred to OT for the evaluation and treatment of bilateral LE lymphedema with greater involvement in the LLE. Swelling began in October 2017 following LTKA. RLE swelling has improved with compression knee high - trace swelling today. Pt.'s LLE is significantly larger than his RLE with pitting edema present throughout from foot to inguinal crease. He tried wearing both knee and thigh high compression on the LLE with little to no success. Mr. Dee Galvez has also had difficulty with his knee replacement even after PT and multiple knee manipulations. Left knee ROM is decreased and it \"locks on him' when he is walking. He states orthopedist says there is a build up of scar tissue but he is getting a second opinion from an orthopedic surgeon next week in [place]. The dense, pitting edema in his left left is making it hard for him to walk, climb stairs and perform his regular activities of daily living. Prior to knee replacement he was a very active man working out/exercising on a regular basis, serving as a  and working full time. 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 He also had to go up a shoe size and has gained 35lbs with swelling in his abdomen and states she just \"feels miserable\". He would benefit from skilled OT for complete decongestive therapy to decrease lymphedema and allow pt to return to his regular ADL's.   Past Medical History/Comorbidities:   Mr. Dee Galvez  has a past medical history of Adverse effect of anesthesia, BMI 31.0-31.9,adult, Chronic pain, DDD (degenerative disc disease), cervical, GERD (gastroesophageal reflux disease), History of kidney stones, Hypertension, Moderate single current episode of major depressive disorder ([facility] [address] Nausea & vomiting, OA (osteoarthritis), PUD (peptic ulcer disease), Sleep apnea, Thromboembolus ([address] Thromboembolus ([address] and Vertigo. Mr. Dee Galvez  has a past surgical history that includes hx appendectomy (as teen ); hx lap cholecystectomy (2011); hx cervical fusion (1998); hx lumbar laminectomy (1995); hx cervical diskectomy (2015); hx tonsillectomy (1993); hx hernia repair (Bilateral, 2016); hx septoplasty (2014); hx hernia repair; hx lithotripsy; hx lysis of adhesions (2012); hx knee arthroscopy (Left); hx knee replacement (Left, 10/09/2017); hx orthopaedic (Left, 12/2017); MANIPULATION LEFT KNEE (Left, 1/8/2018); MANIPULATION LEFT KNEE (Left, 12/11/2017); LEFT KNEE ARTHROPLASTY TOTAL/ WESLY (Left, 10/9/2017); BILATERAL HERNIA INGUINAL REPAIR LAPAROSCOPIC (Bilateral, 8/19/2016); SEPTOPLASTY (N/A, 5/16/2014); SPINE ANTERIOR CERVICAL DISCECTOMY FUSION WITH BONE BANK BONE (N/A, 7/22/2013); and CHOLECYSTECTOMY LAPAROSCOPIC (N/A, 2/3/2012). Social History/Living Environment:     He lives in two story house with his spouse and his having difficulty going up/down stairs since LTKA  Prior Level of Function/Work/Activity:  He works full time as a [employer] supervisor and was a very active  but has not been able to return to this yet  Dominant Side:         RIGHT  Previous Treatment Approaches:          No therapy to date - he has tried compression knee and thighs highs with little success  Current Medications:    Current Outpatient Medications:     amLODIPine (NORVASC) 2.5 mg tablet, Take 1 Tab by mouth daily. , Disp: 30 Tab, Rfl: 2    metoprolol succinate (TOPROL XL) 50 mg XL tablet, Take 1 Tab by mouth daily. , Disp: 90 Tab, Rfl: 3    lisinopril-hydroCHLOROthiazide (PRINZIDE, ZESTORETIC) 20-25 mg per tablet, Take 1 Tab by mouth daily. , Disp: 90 Tab, Rfl: 3    potassium chloride SR (KLOR-CON 10) 10 mEq tablet, Take 1 Tab by mouth daily. , Disp: 90 Tab, Rfl: 3    nebivolol (BYSTOLIC) 20 mg tablet, Take 1 Tab by mouth daily. , Disp: 30 Tab, Rfl: 6    furosemide (LASIX) 40 mg tablet, Take 1 Tab by mouth daily. , Disp: 90 Tab, Rfl: 3    buPROPion XL (WELLBUTRIN XL) 150 mg tablet, Take 1 Tab by mouth every morning., Disp: 30 Tab, Rfl: 5    traZODone (DESYREL) 150 mg tablet, Take 1 Tab by mouth nightly., Disp: 30 Tab, Rfl: 11    escitalopram oxalate (LEXAPRO) 10 mg tablet, Take 1 Tab by mouth daily. , Disp: 30 Tab, Rfl: 11    aspirin delayed-release 81 mg tablet, Take 81 mg by mouth daily. , Disp: , Rfl:     fluticasone (FLONASE) 50 mcg/actuation nasal spray, 2 sprays to each nostril QD (Patient taking differently: 2 Sprays by Both Nostrils route daily. 2 sprays to each nostril QD), Disp: 3 Bottle, Rfl: 11    cpap machine kit, by Does Not Apply route. 12 cm, Disp: , Rfl:             Date Last Reviewed:  10/23/2018   Complexity Level : Expanded review of therapy/medical records (1-2):  MODERATE COMPLEXITY   ASSESSMENT OF OCCUPATIONAL PERFORMANCE:   Palpation:          RLE:  Trace swelling/reverses with elevation/fluid tissue is soft and mobile, maintained with knee high        LLE:  Dense swelling present from foot to inguinal crease with dorsal hump and moderate pitting edema throughout         Abdominal swelling - pt gained 35lbs without changing eating habits  ROM:          Decreased ROM in left knee following LTKA in Oct 2017 - he has had PT and multiple knee manipulations - he has appt next week in [place] for a second opinion   Strength:         Generalized decrease   Special Tests:          Stemmer sign positive   Skin Integrity:          Skin is intact. Integrity is good. He is using a daily moisturizer.    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  Activities of Daily Living : Modified  [place]  with decreased tolerance for LE activity and increased time needed - work activities are affected   Edema/Girth:  pitting   PRETREATMENT AFFECTED LIMB(s): lower extremity      Date:  9/26/18 10/10/18        Right / Left           Groin   []      []  Left LE         8 inches   [x]      [x] 57/58 56.5         4 inches   [x]      [x] 48.5/50 49       PoplitealSpace   [x]      [x] 38/42 41         8 inches   [x]      [x] 43.5/45.5 40.5         4 inches   [x]      [x] 36/39.5 31         Ankle   [x]      [x] 26/29 25         Instep   [x]      [x] 25/26.5 24.5       Measurements are taken in centimeters:  2.54 cm = 1 inch    Total 274/290.5 267.5                      Physical Skills Involved:  1. Range of Motion  2. Strength  3. Activity Tolerance  4. Pain (Chronic)  5. Edema Cognitive Skills Affected (resulting in the inability to perform in a timely and safe manner):   Psychosocial Skills Affected:  1. Habits/Routines  2. Environmental Adaptation  3. Social Interaction   Number of elements that affect the Plan of Care: 3-5:  MODERATE COMPLEXITY   CLINICAL DECISION MAKING:   Outcome Measure: Tool Used: Tool Used: Lymphedema Life Impact Scale   Score:  Initial: 57 Most Recent: X (Date: -- )   Interpretation of Score: The Lymphedema Life Impact Scale (LLIS) is a validated instrument that measures the physical, functional, and psychosocial concerns pertinent to patients with extremity lymphedema. The Scale's questionnaire is administered to patients to gauge impairments, activity limitations, and participation restrictions resulting from their lymphedema. Score 0 1-13 14-26 27-40 41-54 55-67 68   Modifier CH CI CJ CK CL CM CN     ?  Other PT/OT Primary Functional Limitations:     - CURRENT STATUS: CM - 80%-99% impaired, limited or restricted    - GOAL STATUS: CL - 60%-79% impaired, limited or restricted    - D/C STATUS:  ---------------To be determined---------------       Medical Necessity:   · Skilled intervention continues to be required due to uncontrolled swelling of the LLE hindering pt.'s ADL's and putting him at risk for cellulitis. Reason for Services/Other Comments:  · Patient continues to require skilled intervention due to inability to self manage lymphedema at this time. Clinical Decision-Making Assessment:     Use of outcome tool(s) and clinical judgement create a POC that gives a: Questionable prediction of patient's progress: MODERATE COMPLEXITY   TREATMENT:   (In addition to Assessment/Re-Assessment sessions the following treatments were rendered)    Pre-treatment Symptoms/Complaints: No new complaints. Pt is waiting to hear back regarding scheduling knee replacement revision   Pain: Initial:   Pain Intensity 1: 5  Post Session:  4   Occupational Therapy Treatments:    OT eval(x  ) OT eval was completed. Pt received information on lymphedema and risk reduction/self management practices as outlined by the National Lymphedema Network. Treatment was initiated with skin care and compression bandaging. Therapeutic Exercise :  Pt educated on LE exercises to promote lymph flow. Willard Ayala HEP:  As above; handouts given to patient for all exercises. Manual Therapy:60 min  Pt received skin care, MLD, instruction on self MLD, and compression pump trial x 20 minutes.             Manual Lymph Drainage:          Lymph Nodes:    Cervical Supraclavicular Axillary Abdominal Inguinal Popliteal Antecubital   RIGHT [x]     [x]     []     []     []     []     []       LEFT [x]     [x]     [x]     [x]     [x]     [x]     []          Anastamoses:   Axillo-axillary Inguino-inguinal Axillo-inguinal Inguino-axillary   ANTERIOR []     [x]     []     [x]       POSTERIOR []     []     []     []       RIGHT []     []     []     []       LEFT []     []     []     [x]         Limbs:   []    ELMERE     []    MARISELAE []    RLE    [x]    LLE   Compression:  After skin care with Eucerin lotion, applied [place] wrap L LE. He has not purchased biking shorts yet. Gave him contact info for [employer] compression biking shorts. Treatment/Session Assessment:    · Response to Treatment:  Pt tolerated treatment session well with no medical complications. Skin was intact and addressed with eucerin lotion. Pt is pleased with ease of donning and effectiveness of [employer]. · Compliance with Program/Exercises: compliant with home program   · Recommendations/Intent for next treatment session: \"Next visit will focus on complete decongestive therapy and pt education\".   Total Treatment Duration:60min  OT Patient Time In/Time Out  Time In: 1200  Time Out: [address], OT

## 2018-10-25 ENCOUNTER — HOSPITAL ENCOUNTER (OUTPATIENT)
Dept: PHYSICAL THERAPY | Age: 53
Discharge: HOME | End: 2018-10-25
Payer: COMMERCIAL

## 2018-10-25 PROCEDURE — 97140 MANUAL THERAPY 1/> REGIONS: CPT

## 2018-10-25 NOTE — PROGRESS NOTES
Bowen Carr  : 1965  Primary: [facility]  Secondary:  [facility]  [address], [facility], [address]  [phone]   [phone]           OUTPATIENT OCCUPATIONAL THERAPY: Daily Note 10/25/2018    ICD-10: Treatment Diagnosis: I89.0 lymphedema not elsewhere specified, M79.605 pain in left leg, R26.2 difficulty in walking not elsewhere classified  Precautions/Allergies:   Codeine   Fall Risk Score: 1 (? 5 = High Risk)  MD Orders: eval and treat MEDICAL/REFERRING DIAGNOSIS:   Lymphedema, not elsewhere classified [I89.0]   DATE OF ONSET: 2017 following LTKA   REFERRING PHYSICIAN: Danilo Josue MD  RETURN PHYSICIAN APPOINTMENT: to be determined      INITIAL ASSESSMENT:  Mr. Carlos eLe is a 52year old male referred to OT for the evaluation and treatment of bilateral LE lymphedema with greater involvement in the LLE. Swelling began in 2017 following LTKA. RLE swelling has improved with compression knee high - trace swelling today. Pt.'s LLE is significantly larger than his RLE with pitting edema present throughout from foot to inguinal crease. He tried wearing both knee and thigh high compression on the LLE with little to no success. Mr. Carlos Lee has also had difficulty with his knee replacement even after PT and multiple knee manipulations. Left knee ROM is decreased and it \"locks on him' when he is walking. He states orthopedist says there is a build up of scar tissue but he is getting a second opinion from an orthopedic surgeon next week in [place]. The dense, pitting edema in his left left is making it hard for him to walk, climb stairs and perform his regular activities of daily living. Prior to knee replacement he was a very active man working out/exercising on a regular basis, serving as a  and working full time. 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   He also had to go up a shoe size and has gained 35lbs with swelling in his abdomen and states she just \"feels miserable\". He would benefit from skilled OT for complete decongestive therapy to decrease lymphedema and allow pt to return to his regular ADL's. PLAN OF CARE:   PROBLEM LIST:  1. Increased Pain  2. Decreased Activity Tolerance  3. Decreased Flexibility/Joint Mobility  4. Edema/Girth INTERVENTIONS PLANNED  1. Skin care  2. Compression bandaging  3. Fitting for compression garment(s)  4. Manual therapy/Manual lymph drainage  5. Therapeutic exercise/Therapeutic activities  6. Patient Education  7. Compression pump trial prn  8.  kinesiotaping    TREATMENT PLAN:  Effective Dates: 9-26-18 TO 12-24-18. Frequency/Duration: 2 times a week for 90 days  2 x 8week x 90 days and upon reassessment. Will adjust frequency and duration as progress indicates. GOALS: (Goals have been discussed and agreed upon with patient.)  Short-Term Functional Goals: Time Frame: 45 days  1. The patient/caregiver will verbalize understanding of lymphedema precautions. 2. Patient will be independent with skin care regimen to decrease risk of cellulitis. 3. The patient/caregiver will be independent at donning and doffing left upper extremity compression bandages. 4. The patient/caregiver will be independent with self-manual lymph drainage techniques and show decrease in limb volume. 5. Patient will be independent in lymphatic exercises. Discharge Goals: Time Frame: 90 days  1. Patient's left lower extremity circumferential measurements will decrease on volumetric graph by 10-12cm to maximize functional use in ADL's and return to his prior level of activity. 2. The patient/caregiver will be independent with home management of lymphedema. 3. Patient/caregiver will be independent donning and doffing bilateral lower extremity compression garment.     Rehabilitation Potential For Stated Goals: Good  Regarding L[facility] Danyell's therapy, I certify that the treatment plan above will be carried out by a therapist or under their direction. Thank you for this referral,  Laisha George OT                 The information in this section was collected on 10/25/2018 (except where otherwise noted). OCCUPATIONAL PROFILE & HISTORY:   History of Present Injury/Illness (Reason for Referral):  Mr. Indiana Nichols is a 52year old male referred to OT for the evaluation and treatment of bilateral LE lymphedema with greater involvement in the LLE. Swelling began in October 2017 following LTKA. RLE swelling has improved with compression knee high - trace swelling today. Pt.'s LLE is significantly larger than his RLE with pitting edema present throughout from foot to inguinal crease. He tried wearing both knee and thigh high compression on the LLE with little to no success. Mr. Indiana Nichols has also had difficulty with his knee replacement even after PT and multiple knee manipulations. Left knee ROM is decreased and it \"locks on him' when he is walking. He states orthopedist says there is a build up of scar tissue but he is getting a second opinion from an orthopedic surgeon next week in [place]. The dense, pitting edema in his left left is making it hard for him to walk, climb stairs and perform his regular activities of daily living. Prior to knee replacement he was a very active man working out/exercising on a regular basis, serving as a  and working full time. 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 He also had to go up a shoe size and has gained 35lbs with swelling in his abdomen and states she just \"feels miserable\". He would benefit from skilled OT for complete decongestive therapy to decrease lymphedema and allow pt to return to his regular ADL's.   Past Medical History/Comorbidities:   Mr. Indiana Nichols  has a past medical history of Adverse effect of anesthesia, BMI 31.0-31.9,adult, Chronic pain, DDD (degenerative disc disease), cervical, GERD (gastroesophageal reflux disease), History of kidney stones, Hypertension, Moderate single current episode of major depressive disorder ([facility] [address] Nausea & vomiting, OA (osteoarthritis), PUD (peptic ulcer disease), Sleep apnea, Thromboembolus ([address] Thromboembolus ([address] and Vertigo. Mr. Ashley Azevedo  has a past surgical history that includes hx appendectomy (as teen ); hx lap cholecystectomy (2011); hx cervical fusion (1998); hx lumbar laminectomy (1995); hx cervical diskectomy (2015); hx tonsillectomy (1993); hx hernia repair (Bilateral, 2016); hx septoplasty (2014); hx hernia repair; hx lithotripsy; hx lysis of adhesions (2012); hx knee arthroscopy (Left); hx knee replacement (Left, 10/09/2017); hx orthopaedic (Left, 12/2017); MANIPULATION LEFT KNEE (Left, 1/8/2018); MANIPULATION LEFT KNEE (Left, 12/11/2017); LEFT KNEE ARTHROPLASTY TOTAL/ WESLY (Left, 10/9/2017); BILATERAL HERNIA INGUINAL REPAIR LAPAROSCOPIC (Bilateral, 8/19/2016); SEPTOPLASTY (N/A, 5/16/2014); SPINE ANTERIOR CERVICAL DISCECTOMY FUSION WITH BONE BANK BONE (N/A, 7/22/2013); and CHOLECYSTECTOMY LAPAROSCOPIC (N/A, 2/3/2012). Social History/Living Environment:     He lives in two story house with his spouse and his having difficulty going up/down stairs since LTKA  Prior Level of Function/Work/Activity:  He works full time as a [employer] supervisor and was a very active  but has not been able to return to this yet  Dominant Side:         RIGHT  Previous Treatment Approaches:          No therapy to date - he has tried compression knee and thighs highs with little success  Current Medications:    Current Outpatient Medications:     amLODIPine (NORVASC) 2.5 mg tablet, Take 1 Tab by mouth daily. , Disp: 30 Tab, Rfl: 2    metoprolol succinate (TOPROL XL) 50 mg XL tablet, Take 1 Tab by mouth daily. , Disp: 90 Tab, Rfl: 3    lisinopril-hydroCHLOROthiazide (PRINZIDE, ZESTORETIC) 20-25 mg per tablet, Take 1 Tab by mouth daily. , Disp: 90 Tab, Rfl: 3    potassium chloride SR (KLOR-CON 10) 10 mEq tablet, Take 1 Tab by mouth daily. , Disp: 90 Tab, Rfl: 3    nebivolol (BYSTOLIC) 20 mg tablet, Take 1 Tab by mouth daily. , Disp: 30 Tab, Rfl: 6    furosemide (LASIX) 40 mg tablet, Take 1 Tab by mouth daily. , Disp: 90 Tab, Rfl: 3    buPROPion XL (WELLBUTRIN XL) 150 mg tablet, Take 1 Tab by mouth every morning., Disp: 30 Tab, Rfl: 5    traZODone (DESYREL) 150 mg tablet, Take 1 Tab by mouth nightly., Disp: 30 Tab, Rfl: 11    escitalopram oxalate (LEXAPRO) 10 mg tablet, Take 1 Tab by mouth daily. , Disp: 30 Tab, Rfl: 11    aspirin delayed-release 81 mg tablet, Take 81 mg by mouth daily. , Disp: , Rfl:     fluticasone (FLONASE) 50 mcg/actuation nasal spray, 2 sprays to each nostril QD (Patient taking differently: 2 Sprays by Both Nostrils route daily. 2 sprays to each nostril QD), Disp: 3 Bottle, Rfl: 11    cpap machine kit, by Does Not Apply route. 12 cm, Disp: , Rfl:             Date Last Reviewed:  10/25/2018   Complexity Level : Expanded review of therapy/medical records (1-2):  MODERATE COMPLEXITY   ASSESSMENT OF OCCUPATIONAL PERFORMANCE:   Palpation:          RLE:  Trace swelling/reverses with elevation/fluid tissue is soft and mobile, maintained with knee high        LLE:  Dense swelling present from foot to inguinal crease with dorsal hump and moderate pitting edema throughout         Abdominal swelling - pt gained 35lbs without changing eating habits  ROM:          Decreased ROM in left knee following LTKA in Oct 2017 - he has had PT and multiple knee manipulations - he has appt next week in [place] for a second opinion   Strength:         Generalized decrease   Special Tests:          Stemmer sign positive   Skin Integrity:          Skin is intact. Integrity is good. He is using a daily moisturizer.    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  Activities of Daily Living : Modified  [place]  with decreased tolerance for LE activity and increased time needed - work activities are affected   Edema/Girth:  pitting   PRETREATMENT AFFECTED LIMB(s): lower extremity      Date:  9/26/18 10/10/18        Right / Left           Groin   []      []  Left LE         8 inches   [x]      [x] 57/58 56.5         4 inches   [x]      [x] 48.5/50 49       PoplitealSpace   [x]      [x] 38/42 41         8 inches   [x]      [x] 43.5/45.5 40.5         4 inches   [x]      [x] 36/39.5 31         Ankle   [x]      [x] 26/29 25         Instep   [x]      [x] 25/26.5 24.5       Measurements are taken in centimeters:  2.54 cm = 1 inch    Total 274/290.5 267.5                      Physical Skills Involved:  1. Range of Motion  2. Strength  3. Activity Tolerance  4. Pain (Chronic)  5. Edema Cognitive Skills Affected (resulting in the inability to perform in a timely and safe manner):   Psychosocial Skills Affected:  1. Habits/Routines  2. Environmental Adaptation  3. Social Interaction   Number of elements that affect the Plan of Care: 3-5:  MODERATE COMPLEXITY   CLINICAL DECISION MAKING:   Outcome Measure: Tool Used: Tool Used: Lymphedema Life Impact Scale   Score:  Initial: 57 Most Recent: X (Date: -- )   Interpretation of Score: The Lymphedema Life Impact Scale (LLIS) is a validated instrument that measures the physical, functional, and psychosocial concerns pertinent to patients with extremity lymphedema. The Scale's questionnaire is administered to patients to gauge impairments, activity limitations, and participation restrictions resulting from their lymphedema. Score 0 1-13 14-26 27-40 41-54 55-67 68   Modifier CH CI CJ CK CL CM CN     ?  Other PT/OT Primary Functional Limitations:     - CURRENT STATUS: CM - 80%-99% impaired, limited or restricted    - GOAL STATUS: CL - 60%-79% impaired, limited or [facility]    - D/C STATUS:  ---------------To be determined---------------       Medical Necessity:   · Skilled intervention continues to be required due to uncontrolled swelling of the LLE hindering pt.'s ADL's and putting him at risk for cellulitis. Reason for Services/Other Comments:  · Patient continues to require skilled intervention due to inability to self manage lymphedema at this time. Clinical Decision-Making Assessment:     Use of outcome tool(s) and clinical judgement create a POC that gives a: Questionable prediction of patient's progress: MODERATE COMPLEXITY   TREATMENT:   (In addition to Assessment/Re-Assessment sessions the following treatments were rendered)    Pre-treatment Symptoms/Complaints: LLE lymphedema is reducing well but pt is concerned about abdominal swelling. He states he feels \"bloated' and \"miserable\". Pain: Initial:   Pain Intensity 1: 5  Post Session:  5   Occupational Therapy Treatments:    OT eval(x  ) OT eval was completed. Pt received information on lymphedema and risk reduction/self management practices as outlined by the National Lymphedema Network. Treatment was initiated with skin care and compression bandaging. Therapeutic Exercise :  Pt educated on LE exercises to promote lymph flow. [facility] HEP:  As above; handouts given to patient for all exercises.   Manual Therapy:60 min  Pt received skin care, MLD with abdominal work, instruction on self MLD, and Flexitouch compression pump trial for abdominal swelling            Manual Lymph Drainage:          Lymph Nodes:    Cervical Supraclavicular Axillary Abdominal Inguinal Popliteal Antecubital   RIGHT [x]     [x]     []     []     []     []     []       LEFT [x]     [x]     [x]     [x]     [x]     [x]     []          Anastamoses:   Axillo-axillary Inguino-inguinal Axillo-inguinal Inguino-axillary   ANTERIOR []     [x]     []     [x]       POSTERIOR []     []     []     []       RIGHT []     []     []     []       LEFT []     []     []     [x]         Limbs:   []    RUE     []    LUE     []    RLE    [x]    LLE   Compression:  After skin care with Eucerin lotion, applied Farrow wrap L LINDSAY Discussed compression options for abdomen: shirt, solidea abdominal compression, etc.  Pt will consider     Treatment/Session Assessment:    · Response to Treatment:  Pt tolerated treatment session well with no medical complications. Skin was intact and addressed with eucerin lotion. LLE fluid/tissue continues to soften and swelling decrease. Pt will focus on deep breathing, MLD and exercises to decrease abdominal swelling. · Compliance with Program/Exercises: compliant with home program   · Recommendations/Intent for next treatment session: \"Next visit will focus on complete decongestive therapy and pt education\".   Total Treatment Duration:60min  OT Patient Time In/Time Out  Time In: 1200  Time Out: [address], OT

## 2018-11-13 ENCOUNTER — HOSPITAL ENCOUNTER (OUTPATIENT)
Dept: PHYSICAL THERAPY | Age: 53
Discharge: HOME | End: 2018-11-13
Payer: COMMERCIAL

## 2018-11-13 PROCEDURE — 97140 MANUAL THERAPY 1/> REGIONS: CPT

## 2018-11-13 NOTE — PROGRESS NOTES
Raj Wei  : 1965  Primary: [facility]*  Secondary:  [facility]rapy Center at [facility]  [address], [facility], [address]  [phone]   [phone]           OUTPATIENT OCCUPATIONAL THERAPY: Daily Note 2018    ICD-10: Treatment Diagnosis: I89.0 lymphedema not elsewhere specified, M79.605 pain in left leg, R26.2 difficulty in walking not elsewhere classified  Precautions/Allergies:   Codeine   Fall Risk Score: 1 (? 5 = High Risk)  MD Orders: eval and treat MEDICAL/REFERRING DIAGNOSIS:   Lymphedema, not elsewhere classified [I89.0]   DATE OF ONSET: 2017 following LTKA   REFERRING PHYSICIAN: Romeo Leos MD  RETURN PHYSICIAN APPOINTMENT: to be determined      INITIAL ASSESSMENT:  Mr. Gregorio Payton is a 52year old male referred to OT for the evaluation and treatment of bilateral LE lymphedema with greater involvement in the LLE. Swelling began in 2017 following LTKA. RLE swelling has improved with compression knee high - trace swelling today. Pt.'s LLE is significantly larger than his RLE with pitting edema present throughout from foot to inguinal crease. He tried wearing both knee and thigh high compression on the LLE with little to no success. Mr. Gregorio Payton has also had difficulty with his knee replacement even after PT and multiple knee manipulations. Left knee ROM is decreased and it \"locks on him' when he is walking. He states orthopedist says there is a build up of scar tissue but he is getting a second opinion from an orthopedic surgeon next week in [place]. The dense, pitting edema in his left left is making it hard for him to walk, climb stairs and perform his regular activities of daily living. Prior to knee replacement he was a very active man working out/exercising on a regular basis, serving as a  and working full time. 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   He also had to go up a shoe size and has gained 35lbs with swelling in his abdomen and states she just \"feels miserable\". He would benefit from skilled OT for complete decongestive therapy to decrease lymphedema and allow pt to return to his regular ADL's. PLAN OF CARE:   PROBLEM LIST:  1. Increased Pain  2. Decreased Activity Tolerance  3. Decreased Flexibility/Joint Mobility  4. Edema/Girth INTERVENTIONS PLANNED  1. Skin care  2. Compression bandaging  3. Fitting for compression garment(s)  4. Manual therapy/Manual lymph drainage  5. Therapeutic exercise/Therapeutic activities  6. Patient Education  7. Compression pump trial prn  8.  kinesiotaping    TREATMENT PLAN:  Effective Dates: 9-26-18 TO 12-24-18. Frequency/Duration: 2 times a week for 90 days  2 x 8week x 90 days and upon reassessment. Will adjust frequency and duration as progress indicates. GOALS: (Goals have been discussed and agreed upon with patient.)  Short-Term Functional Goals: Time Frame: 45 days  1. The patient/caregiver will verbalize understanding of lymphedema precautions. 2. Patient will be independent with skin care regimen to decrease risk of cellulitis. 3. The patient/caregiver will be independent at donning and doffing left upper extremity compression bandages. 4. The patient/caregiver will be independent with self-manual lymph drainage techniques and show decrease in limb volume. 5. Patient will be independent in lymphatic exercises. Discharge Goals: Time Frame: 90 days  1. Patient's left lower extremity circumferential measurements will decrease on volumetric graph by 10-12cm to maximize functional use in ADL's and return to his prior level of activity. 2. The patient/caregiver will be independent with home management of lymphedema. 3. Patient/caregiver will be independent donning and doffing bilateral lower extremity compression garment.     Rehabilitation Potential For Stated Goals: Good  Regarding Tono Hernández Danyell's therapy, I certify that the treatment plan above will be carried out by a therapist or under their direction. Thank you for this referral,  Felipe Gonzalez OT                 The information in this section was collected on 11/13/2018 (except where otherwise noted). OCCUPATIONAL PROFILE & HISTORY:   History of Present Injury/Illness (Reason for Referral):  Mr. Katerin Andrade is a 52year old male referred to OT for the evaluation and treatment of bilateral LE lymphedema with greater involvement in the LLE. Swelling began in October 2017 following LTKA. RLE swelling has improved with compression knee high - trace swelling today. Pt.'s LLE is significantly larger than his RLE with pitting edema present throughout from foot to inguinal crease. He tried wearing both knee and thigh high compression on the LLE with little to no success. Mr. Katerin Andrade has also had difficulty with his knee replacement even after PT and multiple knee manipulations. Left knee ROM is decreased and it \"locks on him' when he is walking. He states orthopedist says there is a build up of scar tissue but he is getting a second opinion from an orthopedic surgeon next week in [place]. The dense, pitting edema in his left left is making it hard for him to walk, climb stairs and perform his regular activities of daily living. Prior to knee replacement he was a very active man working out/exercising on a regular basis, serving as a  and working full time. 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 He also had to go up a shoe size and has gained 35lbs with swelling in his abdomen and states she just \"feels miserable\". He would benefit from skilled OT for complete decongestive therapy to decrease lymphedema and allow pt to return to his regular ADL's.   Past Medical History/Comorbidities:   Mr. Katerin Andrade  has a past medical history of Adverse effect of anesthesia, BMI 31.0-31.9,adult, Chronic pain, DDD (degenerative disc disease), cervical, GERD (gastroesophageal reflux disease), History of kidney stones, Hypertension, Moderate single current episode of major depressive disorder ([address] Nausea & vomiting, OA (osteoarthritis), PUD (peptic ulcer disease), Sleep apnea, Thromboembolus ([address] Thromboembolus ([address] and Vertigo. Mr. Gabrielle Jordan  has a past surgical history that includes hx appendectomy (as teen ); hx lap cholecystectomy (2011); hx cervical fusion (1998); hx lumbar laminectomy (1995); hx cervical diskectomy (2015); hx tonsillectomy (1993); hx hernia repair (Bilateral, 2016); hx septoplasty (2014); hx hernia repair; hx lithotripsy; hx lysis of adhesions (2012); hx knee arthroscopy (Left); hx knee replacement (Left, 10/09/2017); hx orthopaedic (Left, 12/2017); MANIPULATION LEFT KNEE (Left, 1/8/2018); MANIPULATION LEFT KNEE (Left, 12/11/2017); LEFT KNEE ARTHROPLASTY TOTAL/ WESLY (Left, 10/9/2017); BILATERAL HERNIA INGUINAL REPAIR LAPAROSCOPIC (Bilateral, 8/19/2016); SEPTOPLASTY (N/A, 5/16/2014); SPINE ANTERIOR CERVICAL DISCECTOMY FUSION WITH BONE BANK BONE (N/A, 7/22/2013); and CHOLECYSTECTOMY LAPAROSCOPIC (N/A, 2/3/2012). Social History/Living Environment:     He lives in two story house with his spouse and his having difficulty going up/down stairs since LTKA  Prior Level of Function/Work/Activity:  He works full time as a [employer] supervisor and was a very active  but has not been able to return to this yet  Dominant Side:         RIGHT  Previous Treatment Approaches:          No therapy to date - he has tried compression knee and thighs highs with little success  Current Medications:    Current Outpatient Medications:     nebivolol (BYSTOLIC) 20 mg tablet, Take 1 Tab by mouth daily. , Disp: 90 Tab, Rfl: 3    buPROPion XL (WELLBUTRIN XL) 150 mg tablet, Take 1 Tab by mouth every morning., Disp: 90 Tab, Rfl: 3    lisinopril-hydroCHLOROthiazide (PRINZIDE, ZESTORETIC) 20-25 mg per tablet, Take 1 Tab by mouth daily. , Disp: 90 Tab, Rfl: 3    potassium chloride SR (KLOR-CON 10) 10 mEq tablet, Take 1 Tab by mouth daily. , Disp: 90 Tab, Rfl: 3    furosemide (LASIX) 40 mg tablet, Take 1 Tab by mouth daily. , Disp: 90 Tab, Rfl: 3    aspirin delayed-release 81 mg tablet, Take 81 mg by mouth daily. , Disp: , Rfl:     fluticasone (FLONASE) 50 mcg/actuation nasal spray, 2 sprays to each nostril QD (Patient taking differently: 2 Sprays by Both Nostrils route daily. 2 sprays to each nostril QD), Disp: 3 Bottle, Rfl: 11    cpap machine kit, by Does Not Apply route. 12 cm, Disp: , Rfl:             Date Last Reviewed:  11/13/2018   Complexity Level : Expanded review of therapy/medical records (1-2):  MODERATE COMPLEXITY   ASSESSMENT OF OCCUPATIONAL PERFORMANCE:   Palpation:          RLE:  Trace swelling/reverses with elevation/fluid tissue is soft and mobile, maintained with knee high        LLE:  Dense swelling present from foot to inguinal crease with dorsal hump and moderate pitting edema throughout         Abdominal swelling - pt gained 35lbs without changing eating habits  ROM:          Decreased ROM in left knee following LTKA in Oct 2017 - he has had PT and multiple knee manipulations - he has appt next week in [place] for a second opinion   Strength:         Generalized decrease   Special Tests:          Stemmer sign positive   Skin Integrity:          Skin is intact. Integrity is good. He is using a daily moisturizer.    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  Activities of Daily Living : Modified  [place]  with decreased tolerance for LE activity and increased time needed - work activities are affected   Edema/Girth:  pitting   PRETREATMENT AFFECTED LIMB(s): lower extremity      Date:  9/26/18 10/10/18 11-13-18       Right / Left           Groin   []      []  Left LE LLE 8 inches   [x]      [x] 57/58 56.5 56        4 inches   [x]      [x] 48.5/50 49 48.5      PoplitealSpace   [x]      [x] 38/42 41 40        8 inches   [x]      [x] 43.5/45.5 40.5 40.5        4 inches   [x]      [x] 36/39.5 31 30.5        Ankle   [x]      [x] 26/29 25 25.5        Instep   [x]      [x] 25/26.5 24.5 23.5      Measurements are taken in centimeters:  2.54 cm = 1 inch    Total 274/290.5 267.5 263.5                     Physical Skills Involved:  1. Range of Motion  2. Strength  3. Activity Tolerance  4. Pain (Chronic)  5. Edema Cognitive Skills Affected (resulting in the inability to perform in a timely and safe manner):   Psychosocial Skills Affected:  1. Habits/Routines  2. Environmental Adaptation  3. Social Interaction   Number of elements that affect the Plan of Care: 3-5:  MODERATE COMPLEXITY   CLINICAL DECISION MAKING:   Outcome Measure: Tool Used: Tool Used: Lymphedema Life Impact Scale   Score:  Initial: 57 Most Recent: X (Date: -- )   Interpretation of Score: The Lymphedema Life Impact Scale (LLIS) is a validated instrument that measures the physical, functional, and psychosocial concerns pertinent to patients with extremity lymphedema. The Scale's questionnaire is administered to patients to gauge impairments, activity limitations, and participation restrictions resulting from their lymphedema. Score 0 1-13 14-26 27-40 41-54 55-67 68   Modifier CH CI CJ CK CL CM CN     ? Other PT/OT Primary Functional Limitations:     - CURRENT STATUS: CM - 80%-99% impaired, limited or restricted    - GOAL STATUS: CL - 60%-79% impaired, limited or restricted    - D/C STATUS:  ---------------To be determined---------------       Medical Necessity:   · Skilled intervention continues to be required due to uncontrolled swelling of the LLE hindering pt.'s ADL's and putting him at risk for cellulitis.   Reason for Services/Other Comments:  · Patient continues to require skilled intervention due to inability to self manage lymphedema at this time. Clinical Decision-Making Assessment:     Use of outcome tool(s) and clinical judgement create a POC that gives a: Questionable prediction of patient's progress: MODERATE COMPLEXITY   TREATMENT:   (In addition to Assessment/Re-Assessment sessions the following treatments were rendered)    Pre-treatment Symptoms/Complaints: Pt missed therapy last week due to work/scheduling conflict but was compliant with home program.  He will have knee surgery the first or second week of Dec.   Pain: Initial:   Pain Intensity 1: 5  Post Session:  5   Occupational Therapy Treatments:    Therapeutic Exercise :  Pt educated on LE exercises to promote lymph flow. Asif Counter HEP:  As above; handouts given to patient for all exercises. Manual Therapy:60 min  Pt received skin care, MLD with abdominal work, instruction on self MLD, education on managing lymphedema before/after surgery per guidelines on the NLN. Measurements made of LLE. Manual Lymph Drainage:          Lymph Nodes:    Cervical Supraclavicular Axillary Abdominal Inguinal Popliteal Antecubital   RIGHT [x]     [x]     []     []     []     []     []       LEFT [x]     [x]     [x]     [x]     [x]     [x]     []          Anastamoses:   Axillo-axillary Inguino-inguinal Axillo-inguinal Inguino-axillary   ANTERIOR []     [x]     []     [x]       POSTERIOR []     []     []     []       RIGHT []     []     []     []       LEFT []     []     []     [x]         Limbs:   []    RUE     []    LUE     []    RLE    [x]    LLE   Compression:  After skin care with Eucerin lotion, applied Farrow wrap L LE. Discussed compression options for abdomen: shirt, solidea abdominal compression, etc.  Pt has not purchased compression for abdomen but plans to wait and see after second surgery in a few weeks. Treatment/Session Assessment:    · Response to Treatment:  Pt tolerated treatment session well with no medical complications.   Skin was intact and addressed with eucerin lotion. Overall limb volume has decreased by 27cm in the LLE. Home program is maintaining reduction and pt is very pleased. He will have knee surgery revision the first or second week of Dec.  Recommend continued skilled OT for optimal surgical outcome. · Compliance with Program/Exercises: compliant with home program   · Recommendations/Intent for next treatment session: \"Next visit will focus on complete decongestive therapy and pt education\".   Total Treatment Duration:60min  OT Patient Time In/Time Out  Time In: 0100  Time Out: [address], OT

## 2018-11-15 ENCOUNTER — HOSPITAL ENCOUNTER (OUTPATIENT)
Dept: PHYSICAL THERAPY | Age: 53
Discharge: HOME | End: 2018-11-15
Payer: COMMERCIAL

## 2018-11-15 NOTE — PROGRESS NOTES
Caryl Fu  : 1965  Primary: [facility]*  Secondary:  [facility]rapy [facility] at [facility] Therapy  [address] [place] [address], [facility] [address]  [phone]   [phone]      OUTPATIENT DAILY NOTE    NAME/AGE/GENDER: Caryl Fu is a 52 y.o. male. DATE: 11/15/2018    Patient cancelled for appointment today due to scheduling/work conflict. Will plan to follow up on next scheduled visit.     Yogi Multani, OT

## 2019-02-12 NOTE — THERAPY DISCHARGE
Bonnie Steve  : 1965  Primary: [facility]*  Secondary:  Therapy Center at [address], [facility] [address]  [phone]   [phone]           OUTPATIENT OCCUPATIONAL THERAPY: Discontinuation Summary 2019    ICD-10: Treatment Diagnosis: I89.0 lymphedema not elsewhere specified, M79.605 pain in left leg, R26.2 difficulty in walking not elsewhere classified  Precautions/Allergies:   Codeine   Fall Risk Score: 1 (? 5 = High Risk)  MD Orders: eval and treat MEDICAL/REFERRING DIAGNOSIS:   Lymphedema, not elsewhere classified [I89.0]   DATE OF ONSET: 2017 following LTKA   REFERRING PHYSICIAN: Dong Gongora MD  RETURN PHYSICIAN APPOINTMENT: to be determined    Discontinuation Summary:  Mr. Hiral Cuenca was seen for 8 visits from 18 to 29 and met all goals. Overall limb volume decreased by 27cm in the LLE and pt was scheduled to have L knee revision the first week of December. Due to decreased swelling, Mr. Hiral Cuenca showed improved LLE mobility including greater ease walking and climbing stairs. Mr. Hiral Cuenca demonstrated good knowledge and success with home management of lymphedema including daily, meticulous skin/nail care, self MLD, use of compression bandages or garments as appropriate, and LE exercises to promote lymph flow. He transitioned to daily wear of Lolita Postal and it was successful in maintaining fluid reduction. Originally, Mr. Hiral Cuenca planned to come to therapy for 2 more weeks before scheduled surgery but due to work schedule, canceled last appointments. He was very pleased with his outcome. INITIAL ASSESSMENT:  Mr. Hiral Cuenca is a 52year old male referred to OT for the evaluation and treatment of bilateral LE lymphedema with greater involvement in the LLE. Swelling began in 2017 following LTKA. RLE swelling has improved with compression knee high - trace swelling today.   Pt.'s LLE is significantly larger than his RLE with pitting edema present throughout from foot to inguinal crease. He tried wearing both knee and thigh high compression on the LLE with little to no success. Mr. Indiana Nichols has also had difficulty with his knee replacement even after PT and multiple knee manipulations. Left knee ROM is decreased and it \"locks on him' when he is walking. He states orthopedist says there is a build up of scar tissue but he is getting a second opinion from an orthopedic surgeon next week in [place]. The dense, pitting edema in his left left is making it hard for him to walk, climb stairs and perform his regular activities of daily living. Prior to knee replacement he was a very active man working out/exercising on a regular basis, serving as a  and working full time. 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 He also had to go up a shoe size and has gained 35lbs with swelling in his abdomen and states she just \"feels miserable\". He would benefit from skilled OT for complete decongestive therapy to decrease lymphedema and allow pt to return to his regular ADL's. PLAN OF CARE:   PROBLEM LIST:  1. Increased Pain  2. Decreased Activity Tolerance  3. Decreased Flexibility/Joint Mobility  4. Edema/Girth INTERVENTIONS PLANNED  1. Skin care  2. Compression bandaging  3. Fitting for compression garment(s)  4. Manual therapy/Manual lymph drainage  5. Therapeutic exercise/Therapeutic activities  6. Patient Education  7. Compression pump trial prn  8.  kinesiotaping    TREATMENT PLAN:  Effective Dates: 9-26-18 TO 12-24-18. Frequency/Duration: Discharge from OT    GOALS: (Goals have been discussed and agreed upon with patient.)  Short-Term Functional Goals: Time Frame: 45 days  1. The patient/caregiver will verbalize understanding of lymphedema precautions. Goal met  2. Patient will be independent with skin care regimen to decrease risk of cellulitis. Goal met  3.  The patient/caregiver will be independent at donning and doffing left upper extremity compression bandages. Goal met  4. The patient/caregiver will be independent with self-manual lymph drainage techniques and show decrease in limb volume. Goal met  5. Patient will be independent in lymphatic exercises. Goal met  Discharge Goals: Time Frame: 90 days  1. Patient's left lower extremity circumferential measurements will decrease on volumetric graph by 10-12cm to maximize functional use in ADL's and return to his prior level of activity. Goal met  2. The patient/caregiver will be independent with home management of lymphedema. Goal met  3. Patient/caregiver will be independent donning and doffing bilateral lower extremity compression garment. Goal met  Regarding Deirdre Child's therapy, I certify that the treatment plan above will be carried out by a therapist or under their direction.   Thank you for this referral,  Ligia Solis OT

## 2019-03-26 ENCOUNTER — APPOINTMENT (OUTPATIENT)
Dept: PHYSICAL THERAPY | Age: 54
End: 2019-03-26

## 2019-04-01 ENCOUNTER — HOSPITAL ENCOUNTER (OUTPATIENT)
Dept: PHYSICAL THERAPY | Age: 54
Discharge: HOME | End: 2019-04-01
Payer: COMMERCIAL

## 2019-04-01 PROCEDURE — 97140 MANUAL THERAPY 1/> REGIONS: CPT

## 2019-04-01 PROCEDURE — 97166 OT EVAL MOD COMPLEX 45 MIN: CPT

## 2019-04-01 NOTE — THERAPY EVALUATION
Carlotta Riser : 1965 Primary: [facility]* Secondary:  [facility] [address], [address] [place] [address] [address], [address] [phone]   [phone] OUTPATIENT OCCUPATIONAL THERAPY: Initial Assessment and Daily Note 2019 ICD-10: Treatment Diagnosis: 189.0 lymphedema not elsewhere classified, M79.605 pain in left leg, Z47.1 aftercare following joint replacement Precautions/Allergies:  
Codeine Fall Risk Score:  
 Ambulatory/Rehab Services H2 Model Falls Risk Assessment Risk Factors: 
     (1)  Gender [Male] Ability to Rise from Chair: 
     (0)  Ability to rise in a single movement Falls Prevention Plan: No modifications necessary Total: (5 or greater = High Risk): 1  [facility] [employer] [facility] [place] Apps Patent #[id number]. Federal Law prohibits the replication, distribution or use without written permission from [facility] of 3225 films MD Orders: eval and treat MEDICAL/REFERRING DIAGNOSIS:  
Aftercare following joint replacement [Z47.1] Aftercare following joint replacement surgery [Z47.1] DATE OF ONSET: initial swelling began after first [facility] 2017 REFERRING PHYSICIAN: Bonnie Rizvi MD 
RETURN PHYSICIAN APPOINTMENT: to be determined INITIAL ASSESSMENT:  Mr. Ryan Simmons was referred to OT for the evaluation and treatment of LLE lymphedema. Swelling initially began after first LTKA in 2017. This knee replacement was problematic. He was seen for therapy here from 18 to 18 for the lymphedema and responded very well to complete decongestive therapy. After limb size decreased, he successfully transitioned to [employer]. He had new LTKA 2018. He stopped wearing compression and LLE limb size significantly increased. He also complains of continued abdominal swelling.  He can no longer self manage his lymphedema and needs skilled OT for complete decongestive therapy to reduced LLE swelling back to baseline measurements and address abdominal swelling. PLAN OF CARE:  
PROBLEM LIST: 
1. Increased Pain 2. Decreased Activity Tolerance 3. Decreased Flexibility/Joint Mobility 4. Edema/Girth INTERVENTIONS PLANNED1. Skin care 2. Compression bandaging 3. Fitting for compression garment(s) 4. Manual therapy/Manual lymph drainage 5. Therapeutic exercise/Therapeutic activities 6. Patient Education 7. Compression pump trial prn 
8.  kinesiotaping TREATMENT PLAN: 
Effective Dates:4/1/2019 to  6/30/2019 Frequency/Duration: 2x/week up to 90 days 2 xweek x90 days  and upon reassessment. Will adjust frequency and duration as progress indicates. GOALS: (Goals have been discussed and agreed upon with patient.) Short-Term Functional Goals: Time Frame: 45 days 1. Decrease LE pain by 2 grades to improve LE activity tolerance. 2. Improve LLIS score by 3 points 5. Patient will be independent in lymphatic exercises. Discharge Goals: Time Frame: 90 days 1. Patient's left lower extremity circumferential measurements will decrease on volumetric graph by 10-15cm to maximize functional use in ADL's.   
2. The patient/caregiver will be independent with home management of lymphedema. 3. Patient/caregiver will be independent donning and doffing left lower extremity compression garment and compliant with daily wear. 4. Improve LLIS score by 7 points from 20-39% impaired/limited/restricted to 1-19% impaired/limited/restricted Rehabilitation Potential For Stated Goals: Good Regarding Candance Jo Mahon's therapy, I certify that the treatment plan above will be carried out by a therapist or under their direction. Thank you for this referral, Arnel Cuello, OT Referring Physician Signature: Claudell Dye, MD _________________________  Date _________ The information in this section was collected on 4/1/2019 
 (except where otherwise noted). OCCUPATIONAL PROFILE & HISTORY:  
History of Present Injury/Illness (Reason for Referral): 
 Mr. Elsa Lazo was referred to OT for the evaluation and treatment of LLE lymphedema. Swelling initially began after first LTKA in October 2017. This knee replacement was problematic. He was seen for therapy here from 9-26-18 to 11-13-18 for the lymphedema and responded very well to complete decongestive therapy. After limb size decreased, he successfully transitioned to [employer]. He had new LTKA December 28, 2018. He stopped wearing compression and LLE limb size significantly increased. He also complains of continued abdominal swelling. He can no longer self manage his lymphedema and needs skilled OT for complete decongestive therapy to reduced LLE swelling back to baseline measurements and address abdominal swelling. Past Medical History/Comorbidities:  
Mr. Elsa Lazo  has a past medical history of Adverse effect of anesthesia, BMI 31.0-31.9,adult, Chronic pain, DDD (degenerative disc disease), cervical (07/20/2016), GERD (gastroesophageal reflux disease), History of kidney stones, Hypertension, Moderate single current episode of major depressive disorder ([address] (3/16/2018), Nausea & vomiting, OA (osteoarthritis), PUD (peptic ulcer disease), Sleep apnea, Thromboembolus ([address] (2010), Thromboembolus ([address] (2007), and Vertigo.  He also has no past medical history of Aneurysm ([address] Arrhythmia, Asthma, Autoimmune disease ([address] CAD (coronary artery disease), Cancer ([address] Chronic kidney disease, Chronic obstructive pulmonary disease ([address] Coagulation disorder ([address] Diabetes ([address] Difficult intubation, Endocarditis, Heart failure ([address] Ill-defined condition, Liver disease, Malignant hyperthermia due to anesthesia, Nicotine vapor product user, Non-nicotine vapor product user, Other ill-defined conditions(799.89), Pseudocholinesterase deficiency, Rheumatic fever, Seizures ([facility] [address] Stroke [facility]), Thyroid disease, or Unspecified adverse effect of anesthesia. Mr. Ehsan Queen  has a past surgical history that includes hx appendectomy (as teen ); hx lap cholecystectomy (2011); hx cervical fusion (1998); hx lumbar laminectomy (1995); hx cervical diskectomy (2015); hx tonsillectomy (1993); hx hernia repair (Bilateral, 2016); hx septoplasty (2014); hx hernia repair; hx lithotripsy; hx lysis of adhesions (2012); hx knee arthroscopy (Left); hx knee replacement (Left, 10/09/2017); and hx orthopaedic (Left, 12/2017). Social History/Living Environment:  
  pt lives in his own home with spouse Prior Level of Function/Work/Activity: He works as a  and exercises 3 days/week at [employer]. He is also a  but does not plan to return for at least 6mos following surgery. Dominant Side:  
      RIGHT Current Medications:   
Current Outpatient Medications:  
  atenolol (TENORMIN) 50 mg tablet, Take 1 Tab by mouth daily. , Disp: 30 Tab, Rfl: 11 
  buPROPion XL (WELLBUTRIN XL) 150 mg tablet, Take 1 Tab by mouth every morning., Disp: 90 Tab, Rfl: 3 
  lisinopril-hydroCHLOROthiazide (PRINZIDE, ZESTORETIC) 20-25 mg per tablet, Take 1 Tab by mouth daily. , Disp: 90 Tab, Rfl: 3 
  potassium chloride SR (KLOR-CON 10) 10 mEq tablet, Take 1 Tab by mouth daily. , Disp: 90 Tab, Rfl: 3 
  furosemide (LASIX) 40 mg tablet, Take 1 Tab by mouth daily. , Disp: 90 Tab, Rfl: 3 
  aspirin delayed-release 81 mg tablet, Take 81 mg by mouth daily. , Disp: , Rfl:  
  fluticasone (FLONASE) 50 mcg/actuation nasal spray, 2 sprays to each nostril QD (Patient taking differently: 2 Sprays by Both Nostrils route daily. 2 sprays to each nostril QD), Disp: 3 Bottle, Rfl: 11 
  cpap machine kit, by Does Not Apply route. 12 cm, Disp: , Rfl:   
        
Date Last Reviewed:  4/1/2019 Complexity Level : Expanded review of therapy/medical records (1-2):  MODERATE COMPLEXITY ASSESSMENT OF OCCUPATIONAL PERFORMANCE:  
Palpation:   
      Pt has had trace swelling in RLE in the past but no swelling noted recently. Recommended he wear light compression as preventative measure. LLE;  Swelling from foot to inguinal crease. Swelling is dense and pitting throughout. Does not improve with elevation. Abdominal swelling also present. ROM:   
     105 degrees knee flexion when measured last week in PT - MD would like him to get to 115 degrees Skin Integrity:   
      Skin is intact and taut. Scar from [place] [address]. Sensation:  Intact per pt with some altered sensation around LTKA incision Functional Mobility: independent but with decreased activity tolerance Activities of Daily Living: independent with decreased activity tolerance Edema/Girth:  pitting PRETREATMENT AFFECTED LIMB(s): left lower extremity Date:  4-1-19 Right / Left Groin  
[]      []        
 
8 inches  
[]      [x] 61.0cm       
 
4 inches  
[]      [x] 56.5 PoplitealSpace  
[]      [x] 44.0       
 
8 inches  
[]      [x] 46.5       
 
4 inches  
[]      [x] 35.5 Ankle  
[]      [x] 27.5 Instep  
[]      [x] 26.0 Measurements are taken in centimeters:  2.54 cm = 1 inch Total:left 297.0cm Physical Skills Involved: 1. Range of Motion 2. Activity Tolerance 3. Edema 4. pain Cognitive Skills Affected (resulting in the inability to perform in a timely and safe manner): Psychosocial Skills Affected: 1. Habits/Routines Number of elements that affect the Plan of Care: 3-5:  MODERATE COMPLEXITY CLINICAL DECISION MAKING:  
Outcome Measure: Tool Used: Tool Used: Lymphedema Life Impact Scale Score:  Initial: 21 Most Recent: X (Date: -- ) Interpretation of Score:  The Lymphedema Life Impact Scale (LLIS) is a validated instrument that measures the physical, functional, and psychosocial concerns pertinent to patients with extremity lymphedema. The Scale's questionnaire is administered to patients to gauge impairments, activity limitations, and participation restrictions resulting from their lymphedema. Score 0 1-13 14-26 27-40 41-54 55-67 68 Modifier CH CI CJ CK CL CM CN  
 
? Other PT/OT Primary Functional Limitations:  
  - CURRENT STATUS: CJ - 20%-39% impaired, limited or restricted  - GOAL STATUS: CI - 1%-19% impaired, limited or restricted  - D/C STATUS:  ---------------To be determined--------------- Medical Necessity:  
· Skilled intervention continues to be required due to uncontrolled swelling increasing risk of cellulitis and hindering ADL's as well as knee flexibility. Reason for Services/Other Comments: 
· Patient continues to require skilled intervention due to inability to self manage lymphedema at this time. Clinical Decision-Making Assessment:    
Use of outcome tool(s) and clinical judgement create a POC that gives a: Questionable prediction of patient's progress: MODERATE COMPLEXITY  
TREATMENT:  
(In addition to Assessment/Re-Assessment sessions the following treatments were rendered) Pre-treatment Symptoms/Complaints:  LLE lymphedema from foot to inguinal crease, abdominal swelling Pain: Initial:  
Pain Intensity 1: 5  Post Session:  5 Occupational Therapy Treatments: 
 
OT eval(x  ) OT eval was completed. Pt received information on lymphedema and risk reduction/self management practices as outlined by the National Lymphedema Network. Therapeutic Exercise ( minutes):  Recommended pool exercises for LLE and abdominal lymphedema. He will consider. HEP:  As above; handouts given to patient for all exercises. Manual Therapy:(30min) Treatment was initiated with MLD and compression bandaging. Manual Lymph Drainage: 
        Lymph Nodes: Cervical Supraclavicular Axillary Abdominal Inguinal Popliteal Antecubital  
RIGHT []     [x]     [x]     [x]     [x]     [x]     [] LEFT []     [x]     [x]     [x]     [x]     [x]     [] Anastamoses: Axillo-axillary Inguino-inguinal Axillo-inguinal Inguino-axillary ANTERIOR []     []     []     [x] POSTERIOR []     []     []     []      
RIGHT []     []     []     [] LEFT []     []     []     [x] Limbs:   []    RUE     []    LUE     []    RLE    [x]    LLE Compression: After initial skin care/inspection treatment, a multi layered compression bandage was applied to LLE from foot to knee with surig  from knee to inguinal crease. Pt does not feel he can work in bandages to inguinal crease. He will wear biking shorts over surgi . He tried to wear compression shirt but was too hard to don/doff. Abdominal compression needs will be explored as treatment progresses. Treatment/Session Assessment:   
· Response to Treatment:  Pt tolerated treatment session well with no medical complications. Skin was intact and addressed with Eucerin lotion. His goal for therapy is to reduce LLE swelling back to baseline measurements before surgery so he will be able to manage it himself at home. He would like to see swelling decrease in order to improve knee flexibility and LE activity tolerance. Another goal is to decrease abdominal swelling which he reports makes him feel bloated and miserable. He agrees with treatment plan established today. · Compliance with Program/Exercises: Will assess as treatment progresses. · Recommendations/Intent for next treatment session: \"Next visit will focus on complete decongestive therapy - reduction stage \". Total Treatment Duration:60min OT Patient Time In/Time Out Time In: 1100 Time Out: 1200 Sharon Dasilva OT

## 2019-04-03 ENCOUNTER — HOSPITAL ENCOUNTER (OUTPATIENT)
Dept: PHYSICAL THERAPY | Age: 54
Discharge: HOME | End: 2019-04-03
Payer: COMMERCIAL

## 2019-04-03 PROCEDURE — 97140 MANUAL THERAPY 1/> REGIONS: CPT

## 2019-04-03 NOTE — PROGRESS NOTES
Serafin Burr : 1965 Primary: [facility] [employer] Secondary:  [facility] at [facility] 
[address] [place] [address], [address] [place] [address] [address], [address] [phone]   [phone] OUTPATIENT OCCUPATIONAL THERAPY: Daily Note 4/3/2019 ICD-10: Treatment Diagnosis: 189.0 lymphedema not elsewhere classified, M79.605 pain in left leg, Z47.1 aftercare following joint replacement Precautions/Allergies:  
Codeine Fall Risk Score:  
 Ambulatory/Rehab Services H2 Model Falls Risk Assessment Risk Factors: 
     (1)  Gender [Male] Ability to Rise from Chair: 
     (0)  Ability to rise in a single movement Falls Prevention Plan: No modifications necessary Total: (5 or greater = High Risk): 1  [facility] Patent #[id number]. Federal Law prohibits the replication, distribution or use without written permission from [facility] [place] MD Orders: eval and treat MEDICAL/REFERRING DIAGNOSIS:  
Aftercare following joint replacement [Z47.1] Aftercare following joint replacement surgery [Z47.1] DATE OF ONSET: initial swelling began after first [facility] 2017 REFERRING PHYSICIAN: Iker Maya MD 
RETURN PHYSICIAN APPOINTMENT: to be determined INITIAL ASSESSMENT:  Mr. Bonny Jain was referred to OT for the evaluation and treatment of LLE lymphedema. Swelling initially began after first LTKA in 2017. This knee replacement was problematic. He was seen for therapy here from 18 to 18 for the lymphedema and responded very well to complete decongestive therapy. After limb size decreased, he successfully transitioned to [employer]. He had new LTKA 2018. He stopped wearing compression and LLE limb size significantly increased. He also complains of continued abdominal swelling.  He can no longer self manage his lymphedema and needs skilled OT for complete decongestive therapy to reduced LLE swelling back to baseline measurements and address abdominal swelling. PLAN OF CARE:  
PROBLEM LIST: 
1. Increased Pain 2. Decreased Activity Tolerance 3. Decreased Flexibility/Joint Mobility 4. Edema/Girth INTERVENTIONS PLANNED1. Skin care 2. Compression bandaging 3. Fitting for compression garment(s) 4. Manual therapy/Manual lymph drainage 5. Therapeutic exercise/Therapeutic activities 6. Patient Education 7. Compression pump trial prn 
8.  kinesiotaping TREATMENT PLAN: 
Effective Dates:4/1/2019 to  6/30/2019 Frequency/Duration: 2x/week up to 90 days 2 xweek x90 days  and upon reassessment. Will adjust frequency and duration as progress indicates. GOALS: (Goals have been discussed and agreed upon with patient.) Short-Term Functional Goals: Time Frame: 45 days 1. Decrease LE pain by 2 grades to improve LE activity tolerance. 2. Improve LLIS score by 3 points 5. Patient will be independent in lymphatic exercises. Discharge Goals: Time Frame: 90 days 1. Patient's left lower extremity circumferential measurements will decrease on volumetric graph by 10-15cm to maximize functional use in ADL's.   
2. The patient/caregiver will be independent with home management of lymphedema. 3. Patient/caregiver will be independent donning and doffing left lower extremity compression garment and compliant with daily wear. 4. Improve LLIS score by 7 points from 20-39% impaired/limited/restricted to 1-19% impaired/limited/restricted Rehabilitation Potential For Stated Goals: Good Regarding Rain Young Child's therapy, I certify that the treatment plan above will be carried out by a therapist or under their direction. Thank you for this referral, Tosha Yin OT The information in this section was collected on 4/3/2019 
 (except where otherwise noted).  
OCCUPATIONAL PROFILE & HISTORY:  
 History of Present Injury/Illness (Reason for Referral): 
 Mr. Son Quiros was referred to OT for the evaluation and treatment of LLE lymphedema. Swelling initially began after first LTKA in October 2017. This knee replacement was problematic. He was seen for therapy here from 9-26-18 to 11-13-18 for the lymphedema and responded very well to complete decongestive therapy. After limb size decreased, he successfully transitioned to [employer]. He had new LTKA December 28, 2018. He stopped wearing compression and LLE limb size significantly increased. He also complains of continued abdominal swelling. He can no longer self manage his lymphedema and needs skilled OT for complete decongestive therapy to reduced LLE swelling back to baseline measurements and address abdominal swelling. Past Medical History/Comorbidities:  
Mr. Son Quiros  has a past medical history of Adverse effect of anesthesia, BMI 31.0-31.9,adult, Chronic pain, DDD (degenerative disc disease), cervical (07/20/2016), GERD (gastroesophageal reflux disease), History of kidney stones, Hypertension, Moderate single current episode of major depressive disorder ([address] (3/16/2018), Nausea & vomiting, OA (osteoarthritis), PUD (peptic ulcer disease), Sleep apnea, Thromboembolus ([address] (2010), Thromboembolus ([address] (2007), and Vertigo.  He also has no past medical history of Aneurysm ([address] Arrhythmia, Asthma, Autoimmune disease ([address] CAD (coronary artery disease), Cancer ([address] Chronic kidney disease, Chronic obstructive pulmonary disease ([address] Coagulation disorder ([address] Diabetes ([address] Difficult intubation, Endocarditis, Heart failure ([address] Ill-defined condition, Liver disease, Malignant hyperthermia due to anesthesia, Nicotine vapor product user, Non-nicotine vapor product user, Other ill-defined conditions(799.89), Pseudocholinesterase deficiency, Rheumatic fever, Seizures ([address] Stroke ([address] Thyroid disease, or Unspecified adverse effect of anesthesia. Mr. Clay Irwin  has a past surgical history that includes hx appendectomy (as teen ); hx lap cholecystectomy (2011); hx cervical fusion (1998); hx lumbar laminectomy (1995); hx cervical diskectomy (2015); hx tonsillectomy (1993); hx hernia repair (Bilateral, 2016); hx septoplasty (2014); hx hernia repair; hx lithotripsy; hx lysis of adhesions (2012); hx knee arthroscopy (Left); hx knee replacement (Left, 10/09/2017); and hx orthopaedic (Left, 12/2017). Social History/Living Environment:  
  pt lives in his own home with spouse Prior Level of Function/Work/Activity: He works as a  and exercises 3 days/week at [employer]. He is also a  but does not plan to return for at least 6mos following surgery. Dominant Side:  
      RIGHT Current Medications:   
Current Outpatient Medications:  
  atenolol (TENORMIN) 50 mg tablet, Take 1 Tab by mouth daily. , Disp: 30 Tab, Rfl: 11 
  buPROPion XL (WELLBUTRIN XL) 150 mg tablet, Take 1 Tab by mouth every morning., Disp: 90 Tab, Rfl: 3 
  lisinopril-hydroCHLOROthiazide (PRINZIDE, ZESTORETIC) 20-25 mg per tablet, Take 1 Tab by mouth daily. , Disp: 90 Tab, Rfl: 3 
  potassium chloride SR (KLOR-CON 10) 10 mEq tablet, Take 1 Tab by mouth daily. , Disp: 90 Tab, Rfl: 3 
  furosemide (LASIX) 40 mg tablet, Take 1 Tab by mouth daily. , Disp: 90 Tab, Rfl: 3 
  aspirin delayed-release 81 mg tablet, Take 81 mg by mouth daily. , Disp: , Rfl:  
  fluticasone (FLONASE) 50 mcg/actuation nasal spray, 2 sprays to each nostril QD (Patient taking differently: 2 Sprays by Both Nostrils route daily. 2 sprays to each nostril QD), Disp: 3 Bottle, Rfl: 11 
  cpap machine kit, by Does Not Apply route. 12 cm, Disp: , Rfl:   
        
Date Last Reviewed:  4/3/2019 Complexity Level : Expanded review of therapy/medical records (1-2):  MODERATE COMPLEXITY ASSESSMENT OF OCCUPATIONAL PERFORMANCE:  
Palpation: Pt has had trace swelling in RLE in the past but no swelling noted recently. Recommended he wear light compression as preventative measure. LLE;  Swelling from foot to inguinal crease. Swelling is dense and pitting throughout. Does not improve with elevation. Abdominal swelling also present. ROM:   
     105 degrees knee flexion when measured last week in PT - MD would like him to get to 115 degrees Skin Integrity:   
      Skin is intact and taut. Scar from [place] [address]. Sensation:  Intact per pt with some altered sensation around LTKA incision Functional Mobility: independent but with decreased activity tolerance Activities of Daily Living: independent with decreased activity tolerance Edema/Girth:  pitting PRETREATMENT AFFECTED LIMB(s): left lower extremity Date:  4-1-19 Right / Left Groin  
[]      []        
 
8 inches  
[]      [x] 61.0cm       
 
4 inches  
[]      [x] 56.5 PoplitealSpace  
[]      [x] 44.0       
 
8 inches  
[]      [x] 46.5       
 
4 inches  
[]      [x] 35.5 Ankle  
[]      [x] 27.5 Instep  
[]      [x] 26.0 Measurements are taken in centimeters:  2.54 cm = 1 inch Total:left 297.0cm Physical Skills Involved: 1. Range of Motion 2. Activity Tolerance 3. Edema 4. pain Cognitive Skills Affected (resulting in the inability to perform in a timely and safe manner): Psychosocial Skills Affected: 1. Habits/Routines Number of elements that affect the Plan of Care: 3-5:  MODERATE COMPLEXITY CLINICAL DECISION MAKING:  
Outcome Measure: Tool Used: Tool Used: Lymphedema Life Impact Scale Score:  Initial: 21 Most Recent: X (Date: -- ) Interpretation of Score: The Lymphedema Life Impact Scale (LLIS) is a validated instrument that measures the physical, functional, and psychosocial concerns pertinent to patients with extremity lymphedema. The Scale's questionnaire is administered to patients to gauge impairments, activity limitations, and participation restrictions resulting from their lymphedema. Score 0 1-13 14-26 27-40 41-54 55-67 68 Modifier CH CI CJ CK CL CM CN  
 
? Other PT/OT Primary Functional Limitations:  
  - CURRENT STATUS: CJ - 20%-39% impaired, limited or restricted  - GOAL STATUS: CI - 1%-19% impaired, limited or restricted  - D/C STATUS:  ---------------To be determined--------------- Medical Necessity:  
· Skilled intervention continues to be required due to uncontrolled swelling increasing risk of cellulitis and hindering ADL's as well as knee flexibility. Reason for Services/Other Comments: 
· Patient continues to require skilled intervention due to inability to self manage lymphedema at this time. Clinical Decision-Making Assessment:    
Use of outcome tool(s) and clinical judgement create a POC that gives a: Questionable prediction of patient's progress: MODERATE COMPLEXITY  
TREATMENT:  
(In addition to Assessment/Re-Assessment sessions the following treatments were rendered) Pre-treatment Symptoms/Complaints:  Pt removed bandages last night and did not wear any compression this morning. Advised him to keep compression on at all times in order to make good progress. He has Reese Lucero so if he does not have time to bandage he can wear it. Pain: Initial:  
Pain Intensity 1: 5  Post Session:  5 Occupational Therapy Treatments: 
 
OT eval(x  ) OT eval was completed. Pt received information on lymphedema and risk reduction/self management practices as outlined by the National Lymphedema Network. Therapeutic Exercise ( minutes):  Recommended pool exercises for LLE and abdominal lymphedema. He will consider. HEP:  As above; handouts given to patient for all exercises. Manual Therapy:(60min) Pt recived MLD, skin care and compression bandaging. Manual Lymph Drainage:with instruction on deep breathing and self MLD Lymph Nodes:  
 Cervical Supraclavicular Axillary Abdominal Inguinal Popliteal Antecubital  
RIGHT []     [x]     [x]     [x]     [x]     [x]     [] LEFT []     [x]     [x]     [x]     [x]     [x]     [] Anastamoses: Axillo-axillary Inguino-inguinal Axillo-inguinal Inguino-axillary ANTERIOR []     []     []     [x] POSTERIOR []     []     []     []      
RIGHT []     []     []     [] LEFT []     []     []     [x] Limbs:   []    RUE     []    LUE     []    RLE    [x]    LLE Compression: After initial skin care/inspection treatment, a multi layered compression bandage was applied to LLE from foot to knee with surig  from knee to inguinal crease. Pt does not feel he can work in bandages to inguinal crease. He will wear biking shorts over surgi . Recommended Solidea abdominal compression garment but he states he ordered new compression workout shirt and pants and will try those first.  Principles/techniques of self bandaging reviewed for home program.  
Treatment/Session Assessment:   
· Response to Treatment:  Pt tolerated treatment session well with no medical complications. Skin was intact and addressed with Eucerin lotion. He tolerated bandages well with no complications. · Compliance with Program/Exercises: compliant to date · Recommendations/Intent for next treatment session: \"Next visit will focus on complete decongestive therapy - reduction stage \". Total Treatment Duration:60min OT Patient Time In/Time Out Time In: 1210 Time Out: 0110 Juana Doherty OT

## 2019-04-08 ENCOUNTER — HOSPITAL ENCOUNTER (OUTPATIENT)
Dept: PHYSICAL THERAPY | Age: 54
Discharge: HOME | End: 2019-04-08
Payer: COMMERCIAL

## 2019-04-08 PROCEDURE — 97140 MANUAL THERAPY 1/> REGIONS: CPT

## 2019-04-08 NOTE — PROGRESS NOTES
Audra Getting : 1965 Primary: [facility]* Secondary:  [facility] 
[address] [place] [address], [address] [place] [address] [place], [address] [phone]   [phone] OUTPATIENT OCCUPATIONAL THERAPY: Daily Note 2019 ICD-10: Treatment Diagnosis: 189.0 lymphedema not elsewhere classified, M79.605 pain in left leg, Z47.1 aftercare following joint replacement Precautions/Allergies:  
Codeine Fall Risk Score:  
 Ambulatory/Rehab Services H2 Model Falls Risk Assessment Risk Factors: 
     (1)  Gender [Male] Ability to Rise from Chair: 
     (0)  Ability to rise in a single movement Falls Prevention Plan: No modifications necessary Total: (5 or greater = High Risk): 1  [facility] . [facility] [employer] Patent #[id number]. Federal Law prohibits the replication, distribution or use without written permission from [facility] [employer] MD Orders: eval and treat MEDICAL/REFERRING DIAGNOSIS:  
Aftercare following joint replacement [Z47.1] Aftercare following joint replacement surgery [Z47.1] DATE OF ONSET: initial swelling began after first [facility] 2017 REFERRING PHYSICIAN: Karthik Alvarez MD 
RETURN PHYSICIAN APPOINTMENT: to be determined INITIAL ASSESSMENT:  Mr. Bobette Dubin was referred to OT for the evaluation and treatment of LLE lymphedema. Swelling initially began after first LTKA in 2017. This knee replacement was problematic. He was seen for therapy here from 18 to 18 for the lymphedema and responded very well to complete decongestive therapy. After limb size decreased, he successfully transitioned to [employer]. He had new LTKA 2018. He stopped wearing compression and LLE limb size significantly increased. He also complains of continued abdominal swelling.  He can no longer self manage his lymphedema and needs skilled OT for complete decongestive therapy to reduced LLE swelling back to baseline measurements and address abdominal swelling. PLAN OF CARE:  
PROBLEM LIST: 
1. Increased Pain 2. Decreased Activity Tolerance 3. Decreased Flexibility/Joint Mobility 4. Edema/Girth INTERVENTIONS PLANNED1. Skin care 2. Compression bandaging 3. Fitting for compression garment(s) 4. Manual therapy/Manual lymph drainage 5. Therapeutic exercise/Therapeutic activities 6. Patient Education 7. Compression pump trial prn 
8.  kinesiotaping TREATMENT PLAN: 
Effective Dates:4/1/2019 to  6/30/2019 Frequency/Duration: 2x/week up to 90 days 2 xweek x90 days  and upon reassessment. Will adjust frequency and duration as progress indicates. GOALS: (Goals have been discussed and agreed upon with patient.) Short-Term Functional Goals: Time Frame: 45 days 1. Decrease LE pain by 2 grades to improve LE activity tolerance. 2. Improve LLIS score by 3 points 5. Patient will be independent in lymphatic exercises. Discharge Goals: Time Frame: 90 days 1. Patient's left lower extremity circumferential measurements will decrease on volumetric graph by 10-15cm to maximize functional use in ADL's.   
2. The patient/caregiver will be independent with home management of lymphedema. 3. Patient/caregiver will be independent donning and doffing left lower extremity compression garment and compliant with daily wear. 4. Improve LLIS score by 7 points from 20-39% impaired/limited/restricted to 1-19% impaired/limited/restricted Rehabilitation Potential For Stated Goals: Good Regarding Amy Child's therapy, I certify that the treatment plan above will be carried out by a therapist or under their direction. Thank you for this referral, Krista Craft OT The information in this section was collected on 4/8/2019 
 (except where otherwise noted).  
OCCUPATIONAL PROFILE & HISTORY:  
 History of Present Injury/Illness (Reason for Referral): 
 Mr. Son Quiros was referred to OT for the evaluation and treatment of LLE lymphedema. Swelling initially began after first LTKA in October 2017. This knee replacement was problematic. He was seen for therapy here from 9-26-18 to 11-13-18 for the lymphedema and responded very well to complete decongestive therapy. After limb size decreased, he successfully transitioned to [employer]. He had new LTKA December 28, 2018. He stopped wearing compression and LLE limb size significantly increased. He also complains of continued abdominal swelling. He can no longer self manage his lymphedema and needs skilled OT for complete decongestive therapy to reduced LLE swelling back to baseline measurements and address abdominal swelling. Past Medical History/Comorbidities:  
Mr. Son Quiros  has a past medical history of Adverse effect of anesthesia, BMI 31.0-31.9,adult, Chronic pain, DDD (degenerative disc disease), cervical (07/20/2016), GERD (gastroesophageal reflux disease), History of kidney stones, Hypertension, Moderate single current episode of major depressive disorder ([address] (3/16/2018), Nausea & vomiting, OA (osteoarthritis), PUD (peptic ulcer disease), Sleep apnea, Thromboembolus ([address] (2010), Thromboembolus ([address] (2007), and Vertigo.  He also has no past medical history of Aneurysm ([address] Arrhythmia, Asthma, Autoimmune disease ([address] CAD (coronary artery disease), Cancer ([address] Chronic kidney disease, Chronic obstructive pulmonary disease ([address] Coagulation disorder ([address] Diabetes ([address] Difficult intubation, Endocarditis, Heart failure ([address] Ill-defined condition, Liver disease, Malignant hyperthermia due to anesthesia, Nicotine vapor product user, Non-nicotine vapor product user, Other ill-defined conditions(799.89), Pseudocholinesterase deficiency, Rheumatic fever, Seizures ([address] Stroke ([address] Thyroid disease, or Unspecified adverse effect of anesthesia. Mr. Cat Farrell  has a past surgical history that includes hx appendectomy (as teen ); hx lap cholecystectomy (2011); hx cervical fusion (1998); hx lumbar laminectomy (1995); hx cervical diskectomy (2015); hx tonsillectomy (1993); hx hernia repair (Bilateral, 2016); hx septoplasty (2014); hx hernia repair; hx lithotripsy; hx lysis of adhesions (2012); hx knee arthroscopy (Left); hx knee replacement (Left, 10/09/2017); and hx orthopaedic (Left, 12/2017). Social History/Living Environment:  
  pt lives in his own home with spouse Prior Level of Function/Work/Activity: He works as a  and exercises 3 days/week at [employer]. He is also a  but does not plan to return for at least 6mos following surgery. Dominant Side:  
      RIGHT Current Medications:   
Current Outpatient Medications:  
  atenolol (TENORMIN) 50 mg tablet, Take 1 Tab by mouth daily. , Disp: 30 Tab, Rfl: 11 
  buPROPion XL (WELLBUTRIN XL) 150 mg tablet, Take 1 Tab by mouth every morning., Disp: 90 Tab, Rfl: 3 
  lisinopril-hydroCHLOROthiazide (PRINZIDE, ZESTORETIC) 20-25 mg per tablet, Take 1 Tab by mouth daily. , Disp: 90 Tab, Rfl: 3 
  potassium chloride SR (KLOR-CON 10) 10 mEq tablet, Take 1 Tab by mouth daily. , Disp: 90 Tab, Rfl: 3 
  furosemide (LASIX) 40 mg tablet, Take 1 Tab by mouth daily. , Disp: 90 Tab, Rfl: 3 
  aspirin delayed-release 81 mg tablet, Take 81 mg by mouth daily. , Disp: , Rfl:  
  fluticasone (FLONASE) 50 mcg/actuation nasal spray, 2 sprays to each nostril QD (Patient taking differently: 2 Sprays by Both Nostrils route daily. 2 sprays to each nostril QD), Disp: 3 Bottle, Rfl: 11 
  cpap machine kit, by Does Not Apply route. 12 cm, Disp: , Rfl:   
        
Date Last Reviewed:  4/8/2019 Complexity Level : Expanded review of therapy/medical records (1-2):  MODERATE COMPLEXITY ASSESSMENT OF OCCUPATIONAL PERFORMANCE:  
Palpation: Pt has had trace swelling in RLE in the past but no swelling noted recently. Recommended he wear light compression as preventative measure. LLE;  Swelling from foot to inguinal crease. Swelling is dense and pitting throughout. Does not improve with elevation. Abdominal swelling also present. ROM:   
     105 degrees knee flexion when measured last week in PT - MD would like him to get to 115 degrees Skin Integrity:   
      Skin is intact and taut. Scar from [address]. Sensation:  Intact per pt with some altered sensation around LTKA incision Functional Mobility: independent but with decreased activity tolerance Activities of Daily Living: independent with decreased activity tolerance Edema/Girth:  pitting PRETREATMENT AFFECTED LIMB(s): left lower extremity Date:  4-1-19 4-8-19 Right / Left Groin  
[]      []        
 
8 inches  
[]      [x] 61.0cm 57.0cm      
 
4 inches  
[]      [x] 56.5 52 PoplitealSpace  
[]      [x] 44.0 41.5      
 
8 inches  
[]      [x] 46.5 42.5      
 
4 inches  
[]      [x] 35.5 31 Ankle  
[]      [x] 27.5 26 Instep  
[]      [x] 26.0 25 Measurements are taken in centimeters:  2.54 cm = 1 inch Total:left 297.0cm 275.0cm Physical Skills Involved: 1. Range of Motion 2. Activity Tolerance 3. Edema 4. pain Cognitive Skills Affected (resulting in the inability to perform in a timely and safe manner): Psychosocial Skills Affected: 1. Habits/Routines Number of elements that affect the Plan of Care: 3-5:  MODERATE COMPLEXITY CLINICAL DECISION MAKING:  
Outcome Measure: Tool Used: Tool Used: Lymphedema Life Impact Scale Score:  Initial: 21 Most Recent: X (Date: -- ) Interpretation of Score: The Lymphedema Life Impact Scale (LLIS) is a validated instrument that measures the physical, functional, and psychosocial concerns pertinent to patients with extremity lymphedema. The Scale's questionnaire is administered to patients to gauge impairments, activity limitations, and participation restrictions resulting from their lymphedema. Score 0 1-13 14-26 27-40 41-54 55-67 68 Modifier CH CI CJ CK CL CM CN  
 
? Other PT/OT Primary Functional Limitations:  
  - CURRENT STATUS: CJ - 20%-39% impaired, limited or restricted  - GOAL STATUS: CI - 1%-19% impaired, limited or restricted  - D/C STATUS:  ---------------To be determined--------------- Medical Necessity:  
· Skilled intervention continues to be required due to uncontrolled swelling increasing risk of cellulitis and hindering ADL's as well as knee flexibility. Reason for Services/Other Comments: 
· Patient continues to require skilled intervention due to inability to self manage lymphedema at this time. Clinical Decision-Making Assessment:    
Use of outcome tool(s) and clinical judgement create a POC that gives a: Questionable prediction of patient's progress: MODERATE COMPLEXITY  
TREATMENT:  
(In addition to Assessment/Re-Assessment sessions the following treatments were rendered) Pre-treatment Symptoms/Complaints:  He has been wearing bandages or Madonna Hutch consistently. He also ordered compression shorts, pants and shirts to wear at the gym and prn. Pain: Initial:  
Pain Intensity 1: 5  Post Session:  5 Occupational Therapy Treatments: 
 
 
Therapeutic Exercise ( minutes):  Recommended pool exercises for LLE and abdominal lymphedema. He will consider. HEP:  As above; handouts given to patient for all exercises. Manual Therapy:(60min) Pt recived MLD, skin care and compression bandaging. Measurements made of LLE show and overall decrease in limb volume by 22cm. Manual Lymph Drainage:with instruction on deep breathing and self MLD         Lymph Nodes:  
 Cervical Supraclavicular Axillary Abdominal Inguinal Popliteal Antecubital  
 RIGHT []     [x]     [x]     [x]     [x]     [x]     [] LEFT []     [x]     [x]     [x]     [x]     [x]     [] Anastamoses: Axillo-axillary Inguino-inguinal Axillo-inguinal Inguino-axillary ANTERIOR []     []     []     [x] POSTERIOR []     []     []     []      
RIGHT []     []     []     [] LEFT []     []     []     [x] Limbs:   []    RUE     []    LUE     []    RLE    [x]    LLE Compression: Pt is very active at work, home and gym. He would benefit from multiple compression options based on his activity. He has compression bandages, [address] compression work out clothes. He needs more compression around his knee and thigh but due to activity level has difficulty keeping bandages in place even with biking shorts. Will try class1 thigh high with biking shorts and either bandages or [place] Wrap on calf for added compression. Treatment/Session Assessment:   
· Response to Treatment:  Pt tolerated treatment session well with no medical complications. Skin was intact and addressed with Eucerin lotion. He is making good progress towards goals:  Overall limb volume decreased by 22cm in the LLE. He will try multiple compression options - see compression. ·  Compliance with Program/Exercises: compliant to date · Recommendations/Intent for next treatment session: \"Next visit will focus on complete decongestive therapy - reduction stage \". Total Treatment Duration:60min OT Patient Time In/Time Out Time In: 1100 Time Out: 1200 Claudia Polanco, OT

## 2019-04-10 ENCOUNTER — HOSPITAL ENCOUNTER (OUTPATIENT)
Dept: PHYSICAL THERAPY | Age: 54
Discharge: HOME | End: 2019-04-10
Payer: COMMERCIAL

## 2019-04-11 ENCOUNTER — HOSPITAL ENCOUNTER (OUTPATIENT)
Dept: PHYSICAL THERAPY | Age: 54
Discharge: HOME | End: 2019-04-11
Payer: COMMERCIAL

## 2019-04-11 PROCEDURE — 97140 MANUAL THERAPY 1/> REGIONS: CPT

## 2019-04-11 NOTE — PROGRESS NOTES
Savannah List : 1965 Primary: [facility] [employer] Secondary:  Therapy Center at [address] [facility] 
[address], [address], [address] [phone]   [phone] OUTPATIENT OCCUPATIONAL THERAPY: Daily Note 2019 ICD-10: Treatment Diagnosis: 189.0 lymphedema not elsewhere classified, M79.605 pain in left leg, Z47.1 aftercare following joint replacement Precautions/Allergies:  
Codeine Fall Risk Score:  
 Ambulatory/Rehab Services H2 Model Falls Risk Assessment Risk Factors: 
     (1)  Gender [Male] Ability to Rise from Chair: 
     (0)  Ability to rise in a single movement Falls Prevention Plan: No modifications necessary Total: (5 or greater = High Risk): 1  [facility] [employer] [facility] [employer] Patent #[id number]. Federal Law prohibits the replication, distribution or use without written permission from [facility] [employer] MD Orders: eval and treat MEDICAL/REFERRING DIAGNOSIS:  
Aftercare following joint replacement [Z47.1] Aftercare following joint replacement surgery [Z47.1] DATE OF ONSET: initial swelling began after first [facility] 2017 REFERRING PHYSICIAN: Ramonita Tate MD 
RETURN PHYSICIAN APPOINTMENT: to be determined INITIAL ASSESSMENT:  Mr. Xuan Mckeon was referred to OT for the evaluation and treatment of LLE lymphedema. Swelling initially began after first LTKA in 2017. This knee replacement was problematic. He was seen for therapy here from 18 to 18 for the lymphedema and responded very well to complete decongestive therapy. After limb size decreased, he successfully transitioned to [employer]. He had new LTKA 2018. He stopped wearing compression and LLE limb size significantly increased. He also complains of continued abdominal swelling.  He can no longer self manage his lymphedema and needs skilled OT for complete decongestive therapy to reduced LLE swelling back to baseline measurements and address abdominal swelling. PLAN OF CARE:  
PROBLEM LIST: 
1. Increased Pain 2. Decreased Activity Tolerance 3. Decreased Flexibility/Joint Mobility 4. Edema/Girth INTERVENTIONS PLANNED1. Skin care 2. Compression bandaging 3. Fitting for compression garment(s) 4. Manual therapy/Manual lymph drainage 5. Therapeutic exercise/Therapeutic activities 6. Patient Education 7. Compression pump trial prn 
8.  kinesiotaping TREATMENT PLAN: 
Effective Dates:4/1/2019 to  6/30/2019 Frequency/Duration: 2x/week up to 90 days 2 xweek x90 days  and upon reassessment. Will adjust frequency and duration as progress indicates. GOALS: (Goals have been discussed and agreed upon with patient.) Short-Term Functional Goals: Time Frame: 45 days 1. Decrease LE pain by 2 grades to improve LE activity tolerance. 2. Improve LLIS score by 3 points 5. Patient will be independent in lymphatic exercises. Discharge Goals: Time Frame: 90 days 1. Patient's left lower extremity circumferential measurements will decrease on volumetric graph by 10-15cm to maximize functional use in ADL's.   
2. The patient/caregiver will be independent with home management of lymphedema. 3. Patient/caregiver will be independent donning and doffing left lower extremity compression garment and compliant with daily wear. 4. Improve LLIS score by 7 points from 20-39% impaired/limited/restricted to 1-19% impaired/limited/restricted Rehabilitation Potential For Stated Goals: Good Regarding Kim Child's therapy, I certify that the treatment plan above will be carried out by a therapist or under their direction. Thank you for this referral, Marisol Boyd OT The information in this section was collected on 4/11/2019 
 (except where otherwise noted).  
OCCUPATIONAL PROFILE & HISTORY:  
 History of Present Injury/Illness (Reason for Referral): 
 Mr. Sudha Julien was referred to OT for the evaluation and treatment of LLE lymphedema. Swelling initially began after first LTKA in October 2017. This knee replacement was problematic. He was seen for therapy here from 9-26-18 to 11-13-18 for the lymphedema and responded very well to complete decongestive therapy. After limb size decreased, he successfully transitioned to [employer]. He had new LTKA December 28, 2018. He stopped wearing compression and LLE limb size significantly increased. He also complains of continued abdominal swelling. He can no longer self manage his lymphedema and needs skilled OT for complete decongestive therapy to reduced LLE swelling back to baseline measurements and address abdominal swelling. Past Medical History/Comorbidities:  
Mr. Sudha Julien  has a past medical history of Adverse effect of anesthesia, BMI 31.0-31.9,adult, Chronic pain, DDD (degenerative disc disease), cervical (07/20/2016), GERD (gastroesophageal reflux disease), History of kidney stones, Hypertension, Moderate single current episode of major depressive disorder ([address] (3/16/2018), Nausea & vomiting, OA (osteoarthritis), PUD (peptic ulcer disease), Sleep apnea, Thromboembolus ([address] (2010), Thromboembolus ([address] (2007), and Vertigo.  He also has no past medical history of Aneurysm ([address] Arrhythmia, Asthma, Autoimmune disease ([address] CAD (coronary artery disease), Cancer ([address] Chronic kidney disease, Chronic obstructive pulmonary disease ([address] Coagulation disorder ([address] Diabetes ([address] Difficult intubation, Endocarditis, Heart failure ([address] Ill-defined condition, Liver disease, Malignant hyperthermia due to anesthesia, Nicotine vapor product user, Non-nicotine vapor product user, Other ill-defined conditions(799.89), Pseudocholinesterase deficiency, Rheumatic fever, Seizures ([address] Stroke ([address] Thyroid disease, or Unspecified adverse effect of anesthesia. Mr. Jeannie Bettencourt  has a past surgical history that includes hx appendectomy (as teen ); hx lap cholecystectomy (2011); hx cervical fusion (1998); hx lumbar laminectomy (1995); hx cervical diskectomy (2015); hx tonsillectomy (1993); hx hernia repair (Bilateral, 2016); hx septoplasty (2014); hx hernia repair; hx lithotripsy; hx lysis of adhesions (2012); hx knee arthroscopy (Left); hx knee replacement (Left, 10/09/2017); and hx orthopaedic (Left, 12/2017). Social History/Living Environment:  
  pt lives in his own home with spouse Prior Level of Function/Work/Activity: He works as a  and exercises 3 days/week at [employer]. He is also a  but does not plan to return for at least 6mos following surgery. Dominant Side:  
      RIGHT Current Medications:   
Current Outpatient Medications:  
  atenolol (TENORMIN) 50 mg tablet, Take 1 Tab by mouth daily. , Disp: 30 Tab, Rfl: 11 
  buPROPion XL (WELLBUTRIN XL) 150 mg tablet, Take 1 Tab by mouth every morning., Disp: 90 Tab, Rfl: 3 
  lisinopril-hydroCHLOROthiazide (PRINZIDE, ZESTORETIC) 20-25 mg per tablet, Take 1 Tab by mouth daily. , Disp: 90 Tab, Rfl: 3 
  potassium chloride SR (KLOR-CON 10) 10 mEq tablet, Take 1 Tab by mouth daily. , Disp: 90 Tab, Rfl: 3 
  furosemide (LASIX) 40 mg tablet, Take 1 Tab by mouth daily. , Disp: 90 Tab, Rfl: 3 
  aspirin delayed-release 81 mg tablet, Take 81 mg by mouth daily. , Disp: , Rfl:  
  fluticasone (FLONASE) 50 mcg/actuation nasal spray, 2 sprays to each nostril QD (Patient taking differently: 2 Sprays by Both Nostrils route daily. 2 sprays to each nostril QD), Disp: 3 Bottle, Rfl: 11 
  cpap machine kit, by Does Not Apply route. 12 cm, Disp: , Rfl:   
        
Date Last Reviewed:  4/11/2019 Complexity Level : Expanded review of therapy/medical records (1-2):  MODERATE COMPLEXITY ASSESSMENT OF OCCUPATIONAL PERFORMANCE:  
Palpation: Pt has had trace swelling in RLE in the past but no swelling noted recently. Recommended he wear light compression as preventative measure. LLE;  Swelling from foot to inguinal crease. Swelling is dense and pitting throughout. Does not improve with elevation. Abdominal swelling also present. ROM:   
     105 degrees knee flexion when measured last week in PT - MD would like him to get to 115 degrees Skin Integrity:   
      Skin is intact and taut. Scar from [address]. Sensation:  Intact per pt with some altered sensation around LTKA incision Functional Mobility: independent but with decreased activity tolerance Activities of Daily Living: independent with decreased activity tolerance Edema/Girth:  pitting PRETREATMENT AFFECTED LIMB(s): left lower extremity Date:  4-1-19 4-8-19 Right / Left Groin  
[]      []        
 
8 inches  
[]      [x] 61.0cm 57.0cm      
 
4 inches  
[]      [x] 56.5 52 PoplitealSpace  
[]      [x] 44.0 41.5      
 
8 inches  
[]      [x] 46.5 42.5      
 
4 inches  
[]      [x] 35.5 31 Ankle  
[]      [x] 27.5 26 Instep  
[]      [x] 26.0 25 Measurements are taken in centimeters:  2.54 cm = 1 inch Total:left 297.0cm 275.0cm Physical Skills Involved: 1. Range of Motion 2. Activity Tolerance 3. Edema 4. pain Cognitive Skills Affected (resulting in the inability to perform in a timely and safe manner): Psychosocial Skills Affected: 1. Habits/Routines Number of elements that affect the Plan of Care: 3-5:  MODERATE COMPLEXITY CLINICAL DECISION MAKING:  
Outcome Measure: Tool Used: Tool Used: Lymphedema Life Impact Scale Score:  Initial: 21 Most Recent: X (Date: -- ) Interpretation of Score: The Lymphedema Life Impact Scale (LLIS) is a validated instrument that measures the physical, functional, and psychosocial concerns pertinent to patients with extremity lymphedema. The Scale's questionnaire is administered to patients to gauge impairments, activity limitations, and participation restrictions resulting from their lymphedema. Score 0 1-13 14-26 27-40 41-54 55-67 68 Modifier CH CI CJ CK CL CM CN  
 
? Other PT/OT Primary Functional Limitations:  
  - CURRENT STATUS: CJ - 20%-39% impaired, limited or restricted  - GOAL STATUS: CI - 1%-19% impaired, limited or restricted  - D/C STATUS:  ---------------To be determined--------------- Medical Necessity:  
· Skilled intervention continues to be required due to uncontrolled swelling increasing risk of cellulitis and hindering ADL's as well as knee flexibility. Reason for Services/Other Comments: 
· Patient continues to require skilled intervention due to inability to self manage lymphedema at this time. Clinical Decision-Making Assessment:    
Use of outcome tool(s) and clinical judgement create a POC that gives a: Questionable prediction of patient's progress: MODERATE COMPLEXITY  
TREATMENT:  
(In addition to Assessment/Re-Assessment sessions the following treatments were rendered) Pre-treatment Symptoms/Complaints: He really likes the thigh high for daily compression. He is either wearing Claudell Rater or 1 compression bandage on calf with thigh high. When at the gym he wears compression pants over thigh high and states with compression and after exercise bike, his LLE felt \"so much better\", \"not as tight\". Pain: Initial:  
Pain Intensity 1: 4  Post Session:  4 Occupational Therapy Treatments: 
 
 
Therapeutic Exercise ( minutes):  Recommended pool exercises for LLE and abdominal lymphedema. He will consider. HEP:  As above; handouts given to patient for all exercises. Manual Therapy:(60min) Pt recived MLD in conjunction with 20min compression pump trial, skin care and compression bandaging over thigh high. Measurements made of LLE show and overall decrease in limb volume by 22cm. Manual Lymph Drainage:with instruction on deep breathing and self MLD Lymph Nodes:  
 Cervical Supraclavicular Axillary Abdominal Inguinal Popliteal Antecubital  
RIGHT []     [x]     [x]     [x]     [x]     [x]     [] LEFT []     [x]     [x]     [x]     [x]     [x]     [] Anastamoses: Axillo-axillary Inguino-inguinal Axillo-inguinal Inguino-axillary ANTERIOR []     []     []     [x] POSTERIOR []     []     []     []      
RIGHT []     []     []     [] LEFT []     []     []     [x] Limbs:   []    RUE     []    LUE     []    RLE    [x]    LLE Compression: After treatment, thigh high was donned with one compression bandage over calf. This system is working very well for pt's daily compression needs. Treatment/Session Assessment:   
· Response to Treatment:  Pt tolerated treatment session well with no medical complications. Skin was intact and addressed with Eucerin lotion. He is making good progress towards goals:  Overall limb volume decreased by 22cm in the LLE. Pain decreased by 1 grade. ·  Compliance with Program/Exercises: compliant to date · Recommendations/Intent for next treatment session: \"Next visit will focus on complete decongestive therapy - reduction stage \". Total Treatment Duration:60min OT Patient Time In/Time Out Time In: 0100 Time Out: 0200 Marisol Boyd, OT

## 2019-04-17 ENCOUNTER — HOSPITAL ENCOUNTER (OUTPATIENT)
Dept: PHYSICAL THERAPY | Age: 54
Discharge: HOME | End: 2019-04-17
Payer: COMMERCIAL

## 2019-07-22 ENCOUNTER — HOSPITAL ENCOUNTER (OUTPATIENT)
Dept: CT IMAGING | Age: 54
Discharge: HOME | End: 2019-07-22
Attending: FAMILY MEDICINE
Payer: COMMERCIAL

## 2019-07-22 DIAGNOSIS — G44.229 CHRONIC TENSION-TYPE HEADACHE, NOT INTRACTABLE: ICD-10-CM

## 2019-07-22 PROCEDURE — 70450 CT HEAD/BRAIN W/O DYE: CPT

## 2019-07-22 NOTE — THERAPY DISCHARGE
Patrica Beaulieu  : 1965  Primary: [facility]  Secondary:  Therapy Center at [facility] Therapy  [address], [address]  [phone]   [phone]           OUTPATIENT OCCUPATIONAL THERAPY: Discontinuation Summary 2019    ICD-10: Treatment Diagnosis: 189.0 lymphedema not elsewhere classified, M79.605 pain in left leg, Z47.1 aftercare following joint replacement   Precautions/Allergies:   Codeine   Fall Risk Score:    Ambulatory/Rehab Services H2 Model Falls Risk Assessment    Risk Factors:       (1)  Gender [Male] Ability to Rise from Chair:       (0)  Ability to rise in a single movement    Falls Prevention Plan:       No modifications necessary   Total: (5 or greater = High Risk): 1     [facility] [employer]. All Rights Reserved. [facility] Patent #[id number]. Federal Law prohibits the replication, distribution or use without written permission from [facility] [employer]     MD Orders: eval and treat MEDICAL/REFERRING DIAGNOSIS:   Aftercare following joint replacement [Z47.1]  Aftercare following joint replacement surgery [Z47.1]   DATE OF ONSET: initial swelling began after first LTKA 2017   REFERRING PHYSICIAN: Leigh Desouza MD  RETURN PHYSICIAN APPOINTMENT: to be determined    Discontinuation Summary: Mr. Nikole Arita was seen for 4 OT visits and cancelled one visit. He was making progress towards goals before unplanned discharge. Overall limb volume decreased by 22cm in the LLE and pain decreased by 1 grade. He was using class 1 compression thigh high with Byron Forget or one bandage over the calf for compression and was exercising regularly at the gym. He did not return to therapy so will be discharged from OT. INITIAL ASSESSMENT:  Mr. Nikole Arita was referred to OT for the evaluation and treatment of LLE lymphedema. Swelling initially began after first LTKA in 2017. This knee replacement was problematic.  He was seen for therapy here from 9-26-18 to 11-13-18 for the lymphedema and responded very well to complete decongestive therapy. After limb size decreased, he successfully transitioned to [employer]. He had new LTKA December 28, 2018. He stopped wearing compression and LLE limb size significantly increased. He also complains of continued abdominal swelling. He can no longer self manage his lymphedema and needs skilled OT for complete decongestive therapy to reduced LLE swelling back to baseline measurements and address abdominal swelling. PLAN OF CARE:   PROBLEM LIST:  1. Increased Pain  2. Decreased Activity Tolerance  3. Decreased Flexibility/Joint Mobility  4. Edema/Girth INTERVENTIONS PLANNED  1. Skin care  2. Compression bandaging  3. Fitting for compression garment(s)  4. Manual therapy/Manual lymph drainage  5. Therapeutic exercise/Therapeutic activities  6. Patient Education  7. Compression pump trial prn  8.  kinesiotaping    TREATMENT PLAN:  Effective Dates:4/1/2019 to  6/30/2019 Frequency/Duration: discharge from OT   GOALS: (Goals have been discussed and agreed upon with patient.)  Short-Term Functional Goals: Time Frame: 45 days  1. Decrease LE pain by 2 grades to improve LE activity tolerance. Progress made  2. Improve LLIS score by 3 points unable to assess due to unplanned discharge  5. Patient will be independent in lymphatic exercises. Goal met  Discharge Goals: Time Frame: 90 days  1. Patient's left lower extremity circumferential measurements will decrease on volumetric graph by 10-15cm to maximize functional use in ADL's. Goal met  2. The patient/caregiver will be independent with home management of lymphedema. Unable to assess due to unplanned discharge  3. Patient/caregiver will be independent donning and doffing left lower extremity compression garment and compliant with daily wear. Goal met  4.  Improve LLIS score by 7 points from 20-39% impaired/limited/restricted to 1-19% impaired/limited/restricted unable to assess due to unplanned discharge     Rehabilitation Potential For Stated Goals: Good  Regarding Staci Child's therapy, I certify that the treatment plan above will be carried out by a therapist or under their direction.   Thank you for this referral,  Kailee Coombs, OT

## 2019-12-03 PROBLEM — H81.03 MENIERE'S DISEASE OF BOTH EARS: Status: ACTIVE | Noted: 2019-12-03

## 2021-03-26 PROBLEM — I10 ESSENTIAL HYPERTENSION: Status: ACTIVE | Noted: 2021-03-26

## 2021-04-16 ENCOUNTER — HOSPITAL ENCOUNTER (OUTPATIENT)
Dept: ULTRASOUND IMAGING | Age: 56
Discharge: HOME | End: 2021-04-16
Attending: FAMILY MEDICINE
Payer: COMMERCIAL

## 2021-04-16 DIAGNOSIS — R14.0 BLOATING: ICD-10-CM

## 2021-04-16 PROCEDURE — 76700 US EXAM ABDOM COMPLETE: CPT

## 2021-09-29 PROBLEM — E66.09 CLASS 1 OBESITY DUE TO EXCESS CALORIES WITH SERIOUS COMORBIDITY AND BODY MASS INDEX (BMI) OF 32.0 TO 32.9 IN ADULT: Status: ACTIVE | Noted: 2021-09-29

## 2021-11-17 PROBLEM — Z86.718 HISTORY OF DVT (DEEP VEIN THROMBOSIS): Status: ACTIVE | Noted: 2021-11-17

## 2022-03-18 PROBLEM — I10 ESSENTIAL HYPERTENSION: Status: ACTIVE | Noted: 2021-03-26

## 2022-03-18 PROBLEM — Z86.718 HISTORY OF DVT (DEEP VEIN THROMBOSIS): Status: ACTIVE | Noted: 2021-11-17

## 2022-03-18 PROBLEM — H81.03 MENIERE'S DISEASE OF BOTH EARS: Status: ACTIVE | Noted: 2019-12-03

## 2022-03-19 PROBLEM — E66.09 CLASS 1 OBESITY DUE TO EXCESS CALORIES WITH SERIOUS COMORBIDITY AND BODY MASS INDEX (BMI) OF 32.0 TO 32.9 IN ADULT: Status: ACTIVE | Noted: 2021-09-29

## 2022-03-19 PROBLEM — E66.811 CLASS 1 OBESITY DUE TO EXCESS CALORIES WITH SERIOUS COMORBIDITY AND BODY MASS INDEX (BMI) OF 32.0 TO 32.9 IN ADULT: Status: ACTIVE | Noted: 2021-09-29

## 2022-03-19 PROBLEM — M17.9 OA (OSTEOARTHRITIS) OF KNEE: Status: ACTIVE | Noted: 2017-10-09

## 2022-03-20 PROBLEM — F32.1 MODERATE SINGLE CURRENT EPISODE OF MAJOR DEPRESSIVE DISORDER (HCC): Status: ACTIVE | Noted: 2018-03-16

## 2022-03-28 PROCEDURE — 88305 TISSUE EXAM BY PATHOLOGIST: CPT

## 2022-03-29 ENCOUNTER — HOSPITAL ENCOUNTER (OUTPATIENT)
Dept: LAB | Age: 57
Discharge: HOME | End: 2022-03-29

## 2022-06-08 ENCOUNTER — OFFICE VISIT (OUTPATIENT)
Dept: FAMILY MEDICINE CLINIC | Facility: CLINIC | Age: 57
End: 2022-06-08
Payer: COMMERCIAL

## 2022-06-08 ENCOUNTER — NURSE ONLY (OUTPATIENT)
Dept: FAMILY MEDICINE CLINIC | Facility: CLINIC | Age: 57
End: 2022-06-08

## 2022-06-08 VITALS
HEIGHT: 75 IN | BODY MASS INDEX: 32.08 KG/M2 | OXYGEN SATURATION: 97 % | HEART RATE: 56 BPM | TEMPERATURE: 98.7 F | WEIGHT: 258 LBS | RESPIRATION RATE: 16 BRPM | SYSTOLIC BLOOD PRESSURE: 176 MMHG | DIASTOLIC BLOOD PRESSURE: 90 MMHG

## 2022-06-08 DIAGNOSIS — M50.30 DDD (DEGENERATIVE DISC DISEASE), CERVICAL: ICD-10-CM

## 2022-06-08 DIAGNOSIS — M17.0 PRIMARY OSTEOARTHRITIS OF BOTH KNEES: ICD-10-CM

## 2022-06-08 DIAGNOSIS — R53.82 CHRONIC FATIGUE: ICD-10-CM

## 2022-06-08 DIAGNOSIS — K21.9 GASTROESOPHAGEAL REFLUX DISEASE WITHOUT ESOPHAGITIS: ICD-10-CM

## 2022-06-08 DIAGNOSIS — F32.1 MODERATE SINGLE CURRENT EPISODE OF MAJOR DEPRESSIVE DISORDER (HCC): ICD-10-CM

## 2022-06-08 DIAGNOSIS — I10 ESSENTIAL HYPERTENSION: Primary | ICD-10-CM

## 2022-06-08 DIAGNOSIS — H81.03 MENIERE'S DISEASE OF BOTH EARS: ICD-10-CM

## 2022-06-08 DIAGNOSIS — M47.812 SPONDYLOSIS OF CERVICAL REGION WITHOUT MYELOPATHY OR RADICULOPATHY: ICD-10-CM

## 2022-06-08 DIAGNOSIS — I10 ESSENTIAL HYPERTENSION: ICD-10-CM

## 2022-06-08 DIAGNOSIS — N20.0 KIDNEY STONE: ICD-10-CM

## 2022-06-08 LAB
CHOLEST SERPL-MCNC: 235 MG/DL
HDLC SERPL-MCNC: 47 MG/DL (ref 40–60)
HDLC SERPL: 5 {RATIO}
LDLC SERPL CALC-MCNC: 154.2 MG/DL
TRIGL SERPL-MCNC: 169 MG/DL (ref 35–150)
TSH, 3RD GENERATION: 1.01 UIU/ML (ref 0.36–3.74)
VLDLC SERPL CALC-MCNC: 33.8 MG/DL (ref 6–23)

## 2022-06-08 PROCEDURE — 99214 OFFICE O/P EST MOD 30 MIN: CPT | Performed by: FAMILY MEDICINE

## 2022-06-08 RX ORDER — VALSARTAN 320 MG/1
320 TABLET ORAL DAILY
Qty: 90 TABLET | Refills: 3 | Status: SHIPPED | OUTPATIENT
Start: 2022-06-08

## 2022-06-08 RX ORDER — MELOXICAM 7.5 MG/1
7.5 TABLET ORAL 2 TIMES DAILY
Qty: 30 TABLET | Refills: 3 | Status: SHIPPED | OUTPATIENT
Start: 2022-06-08 | End: 2022-06-15

## 2022-06-08 RX ORDER — FUROSEMIDE 40 MG/1
40 TABLET ORAL DAILY
Qty: 90 TABLET | Refills: 3 | Status: SHIPPED | OUTPATIENT
Start: 2022-06-08

## 2022-06-08 RX ORDER — HYDRALAZINE HYDROCHLORIDE 100 MG/1
100 TABLET, FILM COATED ORAL 3 TIMES DAILY
Qty: 90 TABLET | Refills: 11 | Status: SHIPPED | OUTPATIENT
Start: 2022-06-08

## 2022-06-08 RX ORDER — BUPROPION HYDROCHLORIDE 150 MG/1
150 TABLET ORAL EVERY MORNING
Qty: 90 TABLET | Refills: 3 | Status: SHIPPED | OUTPATIENT
Start: 2022-06-08

## 2022-06-08 RX ORDER — ATENOLOL 50 MG/1
50 TABLET ORAL DAILY
Qty: 90 TABLET | Refills: 3 | Status: SHIPPED | OUTPATIENT
Start: 2022-06-08

## 2022-06-08 RX ORDER — POTASSIUM CHLORIDE 600 MG/1
8 TABLET, FILM COATED, EXTENDED RELEASE ORAL 2 TIMES DAILY
Qty: 180 TABLET | Refills: 2 | Status: SHIPPED | OUTPATIENT
Start: 2022-06-08

## 2022-06-08 RX ORDER — CYCLOBENZAPRINE HCL 10 MG
10 TABLET ORAL 3 TIMES DAILY PRN
Qty: 20 TABLET | Refills: 1 | Status: SHIPPED | OUTPATIENT
Start: 2022-06-08

## 2022-06-08 RX ORDER — RABEPRAZOLE SODIUM 20 MG/1
20 TABLET, DELAYED RELEASE ORAL DAILY
Qty: 90 TABLET | Refills: 3 | Status: SHIPPED | OUTPATIENT
Start: 2022-06-08

## 2022-06-08 ASSESSMENT — PATIENT HEALTH QUESTIONNAIRE - PHQ9
1. LITTLE INTEREST OR PLEASURE IN DOING THINGS: 0
2. FEELING DOWN, DEPRESSED OR HOPELESS: 0
SUM OF ALL RESPONSES TO PHQ QUESTIONS 1-9: 0
SUM OF ALL RESPONSES TO PHQ9 QUESTIONS 1 & 2: 0
SUM OF ALL RESPONSES TO PHQ QUESTIONS 1-9: 0

## 2022-06-08 NOTE — PROGRESS NOTES
[address] [facility]  [address]  Phone: [phone] Fax [phone]  Irene Law MD  6/8/2022           Mr. Fay Kaiser  is a 60y.o.  year old  male patient who comes in complaining of his blood pressure being very high and he just not feeling well. His blood pressures been uncontrolled for about the past year and he has been working with cardiology to get it down. He is considering having a procedure on his renal arteries to see if it will lower his blood pressure. He has not had any chest pain or shortness of breath. He has had a stress test which was apparently normal but has not had a heart cath. He just does not feel well. He does wear his CPAP every night. He is very diligent about taking his blood pressure medicine regularly. He Is not down or blue. He is not depressed. He feels it is either medicine or some condition internally that is causing him to feel so fatigued. He did pass a kidney stone last week. He does have some stone still in his left kidney and would like to see urology about possibly getting them removed before he has any heart procedure. His potassium has been low in the past.  He does take potassium supplements and would filled and his potassium rechecked. Mr. Fay Kaiser  has  has a past medical history of Adverse effect of anesthesia, BMI 31.0-31.9,adult, Chronic pain, DDD (degenerative disc disease), cervical, GERD (gastroesophageal reflux disease), History of kidney stones, Hypertension, Moderate single current episode of major depressive disorder ([address] Nausea & vomiting, OA (osteoarthritis), PUD (peptic ulcer disease), Sleep apnea, Thromboembolus ([address] Thromboembolus ([address] and Vertigo. Mr. Fay Kaiser  has  has a past surgical history that includes Total knee arthroplasty (2018); hernia repair; Septoplasty (2014); hernia repair (Bilateral, 2016); Tonsillectomy (1993);  Cervical discectomy (2015); lumbar laminectomy (1995); cervical fusion (1998); Cholecystectomy, laparoscopic (2011); Appendectomy (as teen ); lysis of adhesions (2012); Knee arthroscopy (Left); Lithotripsy; orthopedic surgery (Left, 12/2017); and Total knee arthroplasty (Left, 10/09/2017). Mr. Jose M Montalvo   Current Outpatient Medications   Medication Sig Dispense Refill    cyclobenzaprine (FLEXERIL) 10 mg tablet Take 1 tablet by mouth 3 times daily as needed for Muscle spasms 20 tablet 1    atenolol (TENORMIN) 50 MG tablet Take 1 tablet by mouth daily 90 tablet 3    buPROPion (WELLBUTRIN XL) 150 MG extended release tablet Take 1 tablet by mouth every morning 90 tablet 3    furosemide (LASIX) 40 MG tablet Take 1 tablet by mouth daily 90 tablet 3    meloxicam (MOBIC) 7.5 MG tablet Take 1 tablet by mouth 2 times daily 30 tablet 3    potassium chloride (KLOR-CON) 8 MEQ extended release tablet Take 1 tablet by mouth 2 times daily TAKE 1 TABLET BY MOUTH TWICE A  tablet 2    RABEprazole (ACIPHEX) 20 MG tablet Take 1 tablet by mouth daily 90 tablet 3    valsartan (DIOVAN) 320 MG tablet Take 1 tablet by mouth daily 90 tablet 3    hydrALAZINE (APRESOLINE) 100 MG tablet Take 1 tablet by mouth 3 times daily 90 tablet 11    aspirin 81 MG EC tablet Take 81 mg by mouth daily      fluticasone (FLONASE) 50 MCG/ACT nasal spray 2 sprays to each nostril QD      meclizine (ANTIVERT) 12.5 MG tablet Take 12.5 mg by mouth 3 times daily as needed      ondansetron (ZOFRAN-ODT) 8 MG TBDP disintegrating tablet Take 8 mg by mouth every 8 hours as needed       No current facility-administered medications for this visit. Mr. Johanna Dai History     Socioeconomic History    Marital status:      Spouse name: None    Number of children: None    Years of education: None    Highest education level: None   Occupational History    None   Tobacco Use    Smoking status: Never Smoker    Smokeless tobacco: Never Used   Substance and Sexual Activity    Alcohol use:  Yes Alcohol/week: 0.0 standard drinks    Drug use: No    Sexual activity: None     Comment: wife   Other Topics Concern    None   Social History Narrative    Has 2 stepdaughters as well       Social Determinants of Health     Financial Resource Strain:     Difficulty of Paying Living Expenses: Not on file   Food Insecurity:     Worried About Running Out of Food in the Last Year: Not on file    Osito of Food in the Last Year: Not on file   Transportation Needs:     Lack of Transportation (Medical): Not on file    Lack of Transportation (Non-Medical): Not on file   Physical Activity:     Days of Exercise per Week: Not on file    Minutes of Exercise per Session: Not on file   Stress:     Feeling of Stress : Not on file   Social Connections:     Frequency of Communication with Friends and Family: Not on file    Frequency of Social Gatherings with Friends and Family: Not on file    Attends Shinto Services: Not on file    Active Member of Clubs or Organizations: Not on file    Attends Club or Organization Meetings: Not on file    Marital Status: Not on file   Intimate Partner Violence:     Fear of Current or Ex-Partner: Not on file    Emotionally Abused: Not on file    Physically Abused: Not on file    Sexually Abused: Not on file   Housing Stability:     Unable to Pay for Housing in the Last Year: Not on file    Number of [place] in the Last Year: Not on file    Unstable Housing in the Last Year: Not on file       Mr. London Amador History   Problem Relation Age of Onset    Stroke Brother     Hypertension Brother     Diabetes Brother     Hypertension Sister     Hypertension Father    Oralia Contreras Father         lung    Hypertension Mother     Heart Disease Mother         CHF            Mr. Jose Carlos Dumont  has the following allergies:    Allergies   Allergen Reactions    Codeine Itching       BP (!) 176/90   Pulse 56   Temp 98.7 °F (37.1 °C) (Temporal)   Resp 16   Ht 6' 3\" (1.905 m)   Wt 258 lb (117 kg)   SpO2 97%   BMI 32.25 kg/m²     HEENT: Normocephalic, atraumatic, pupils equal and reactive to light. Neck: Supple, no masses or thyromegaly. Lungs: clear to auscultation bilaterally. CV: regular rate and rhythm, without murmurs, rubs, or gallops  Ext: No lower extremity edema. Stevo Becerra was seen today for nephrolithiasis, hypertension and abnormal lab. Diagnoses and all orders for this visit:    Essential hypertension  -     atenolol (TENORMIN) 50 MG tablet; Take 1 tablet by mouth daily  -     furosemide (LASIX) 40 MG tablet; Take 1 tablet by mouth daily  -     potassium chloride (KLOR-CON) 8 MEQ extended release tablet; Take 1 tablet by mouth 2 times daily TAKE 1 TABLET BY MOUTH TWICE A DAY  -     valsartan (DIOVAN) 320 MG tablet; Take 1 tablet by mouth daily  -     Comprehensive Metabolic Panel; Future  -     Lipid Panel; Future  -     hydrALAZINE (APRESOLINE) 100 MG tablet; Take 1 tablet by mouth 3 times daily    Kidney stone  -     [facility] Cheyenne Hong MD, Urology, [place]    Moderate single current episode of major depressive disorder ([facility] [address]  -     buPROPion (WELLBUTRIN XL) 150 MG extended release tablet; Take 1 tablet by mouth every morning    DDD (degenerative disc disease), cervical  -     meloxicam (MOBIC) 7.5 MG tablet; Take 1 tablet by mouth 2 times daily    Primary osteoarthritis of both knees  -     meloxicam (MOBIC) 7.5 MG tablet; Take 1 tablet by mouth 2 times daily    Spondylosis of cervical region without myelopathy or radiculopathy  -     meloxicam (MOBIC) 7.5 MG tablet; Take 1 tablet by mouth 2 times daily    Gastroesophageal reflux disease without esophagitis  -     RABEprazole (ACIPHEX) 20 MG tablet; Take 1 tablet by mouth daily    Meniere's disease of both ears    Chronic fatigue  -     Comprehensive Metabolic Panel; Future  -     TSH; Future    Other orders  -     cyclobenzaprine (FLEXERIL) 10 mg tablet;  Take 1 tablet by mouth 3 times daily as needed for Muscle spasms      Increase hydralazine to 100 g 3 times a day. Continue all his other above medications. Check laboratories today. Follow-up 1 month. I would like him to see cardiology about the need for possible heart cath versus other procedure for his renal arteries. Also refer for kidney stone evaluation.   Sandhya Patiño MD

## 2022-06-10 ENCOUNTER — TELEPHONE (OUTPATIENT)
Dept: CARDIOLOGY CLINIC | Age: 57
End: 2022-06-10

## 2022-06-10 NOTE — TELEPHONE ENCOUNTER
Dr. Suzette Forrester I continue to have high BP. I had to go to ER last week because of a kidney stone attack. Due to Potassium levels hit 3.5 I was almost lifeless past Saturday. So, I scheduled appointment with Dr. Christie Odell for 6/8/22. Once again BP was 176/90, I just always feel fatigued, tired, and just want to lay around. I mentioned to her about the Renal Artery Denervation. She asked me to get back to you and see if thats a possibility. If so, where and how do we get the ball in motion. Im honestly worried if my BP levels continue I may fall in the same boat with my mother (renal failure, hardening arteries, congestive heart failure), both Grandmothers (aneurisms), two uncles (heart attacks). I just hope something can be done to help me feel better. Just feel fatigued, tired and restless always        valsartan (DIOVAN) 320 mg tablet, Take 1 Tablet by mouth daily. , Disp: 90 Tablet, Rfl: 3     RABEprazole (ACIPHEX) 20 mg TbEC, TAKE ONE TABLET BY MOUTH ONE TIME DAILY, Disp: 90 Tablet, Rfl: 3    cyclobenzaprine (FLEXERIL) 10 mg tablet, Take 1 Tablet by mouth three (3) times daily as needed for Muscle Spasm(s). , Disp: 20 Tablet, Rfl: 1    meloxicam (MOBIC) 7.5 mg tablet, Take 1 Tablet by mouth two (2) times a day., Disp: 60 Tablet, Rfl: 0    fluticasone propionate (Flonase) 50 mcg/actuation nasal spray, 2 sprays to each nostril QD, Disp: 1 Each, Rfl: 11    meclizine (ANTIVERT) 12.5 mg tablet, Take 1 Tablet by mouth three (3) times daily as needed for Dizziness. , Disp: 30 Tablet, Rfl: 3    ondansetron (Zofran ODT) 8 mg disintegrating tablet, Take 1 Tablet by mouth every eight (8) hours as needed for Nausea., Disp: 10 Tablet, Rfl: 0    atenoloL (TENORMIN) 50 mg tablet, Take 1 Tablet by mouth daily. , Disp: 90 Tablet, Rfl: 3    buPROPion XL (WELLBUTRIN XL) 150 mg tablet, Take 1 Tablet by mouth every morning., Disp: 90 Tablet, Rfl: 3    furosemide (LASIX) 40 mg tablet, Take 1 Tablet by mouth daily. , Disp: 90 Tablet, Rfl: 3    potassium chloride SR (KLOR-CON 8) 8 mEq tablet, TAKE 1 TABLET BY MOUTH TWICE A DAY, Disp: 180 Tablet, Rfl: 3    aspirin delayed-release 81 mg tablet, Take 81 mg by mouth daily. , Disp: , Rfl:     cpap machine kit, by Does Not Apply route.  12 cm, Disp: , Rfl:    HYDRALAZINE 100MG TID

## 2022-06-13 NOTE — TELEPHONE ENCOUNTER
Has not yet been approved by the FDA it will probably be a year before it is approved.   The closest current trial site is and [facility] with Dr. Missael De La Rosa

## 2022-06-14 NOTE — PROGRESS NOTES
[address] [place] [address] Court, [address] [facility]  [phone]    Patient ID:  Name: Edgardo Burroughs  MRN: [id number]  AGE: 60 y.o.  : 1965          Office Visit 6/15/2022    CHIEF COMPLAINT:    Chief Complaint   Patient presents with    Sleep Apnea    Follow-up       HISTORY OF PRESENT ILLNESS:    Pt is a 60 y.o. male  with a history of obesity and MELISSA. He had a PSG on 16 with an AHI of 8.5/hr with desaturations to 87%. Pt is on CPAP 10-15cm H2O. Pt is seen today for follow up.   Pt reports that he is doing fine with CPAP but he still doesn't feel like he is sleeping well. He is now taking melatonin around 8pm. He then goes to bed around 9:30pm. He will fall asleep just fine but will wake up around 2:30am and then tosses and turns for a while until he falls into a deep sleep. He then has a hard time getting up in the mornings. He has also had issues with his BP being elevated. He is on 3 antihypertensive medications. He is followed by Dr. Suzie Jones with [facility]. He feels frustrated with the fact that he is using CPAP as prescribed but continues to feel tired. He just has little to no energy. Pt has excellent compliance with use 90/90 days with an average use of 7 hrs and 45 mins per night. Pt has a mask leak of 1.4L/min with an AHI of 2.2/hr. Pt is benefiting and tolerating from PAP therapy. I will check an ELDA on APAP to ensure adequate oxygenation and make adjustments if needed.        ALLERGIES:   Allergies   Allergen Reactions    Codeine Itching          Past Medical History:   Past Medical History:   Diagnosis Date    Adverse effect of anesthesia     after hernia surgery in 2016 here at [address] \"I had trouble breathing and I blacked out\"    BMI 31.0-31.9,adult     Chronic pain     DDD (degenerative disc disease), cervical 2016    several surgeries to correct    GERD (gastroesophageal reflux disease)     seldom     History of kidney stones     numerous, lithotripsy x 1    Hypertension     controlled with meds    Moderate single current episode of major depressive disorder ([address] 3/16/2018    Nausea & vomiting     sometimes after anesthesia    OA (osteoarthritis)     PUD (peptic ulcer disease)     no recent episodes    Sleep apnea     c-pap    Thromboembolus ([address] 2010    left knee s/p knee arth    Thromboembolus ([address] 2007    s/p lithotripsy-     Vertigo          Problem List:   Patient Active Problem List   Diagnosis    Osteoarthritis of cervical spine    History of DVT (deep vein thrombosis)    DDD (degenerative disc disease), cervical    S/P hernia repair    Kidney stone    MELISSA (obstructive sleep apnea)    Meniere's disease of both ears    Essential hypertension    OA (osteoarthritis) of knee    Hypersomnia    Class 1 obesity due to excess calories with serious comorbidity and body mass index (BMI) of 32.0 to 32.9 in adult    Moderate single current episode of major depressive disorder ([address]    Gastroesophageal reflux disease without esophagitis         Surgical History:   Past Surgical History:   Procedure Laterality Date    APPENDECTOMY  as teen     CERVICAL DISCECTOMY  2015   [facility]    x 3 fusion    CHOLECYSTECTOMY, LAPAROSCOPIC  2011    HERNIA REPAIR      umbilical     HERNIA REPAIR Bilateral 2016    KNEE ARTHROSCOPY Left     left knee x 2    LITHOTRIPSY      LUMBAR LAMINECTOMY  1995    lumbar laminectomy    LYSIS OF ADHESIONS  2012    ORTHOPEDIC SURGERY Left 12/2017    Left knee manipulation    SEPTOPLASTY  2014    TONSILLECTOMY  1993    TOTAL KNEE ARTHROPLASTY  2018    left     TOTAL KNEE ARTHROPLASTY Left 10/09/2017       Pulmonary Studies: No flowsheet data found.       Social History:   Social History     Socioeconomic History    Marital status:      Spouse name: Not on file    Number of children: Not on file    Years of education: Not on file    Highest education level: Not on file   Occupational History    Not on file   Tobacco Use    Smoking status: Never Smoker    Smokeless tobacco: Never Used   Substance and Sexual Activity    Alcohol use: Yes     Alcohol/week: 0.0 standard drinks    Drug use: No    Sexual activity: Not on file     Comment: wife   Other Topics Concern    Not on file   Social History Narrative    Has 2 stepdaughters as well       Social Determinants of Health     Financial Resource Strain:     Difficulty of Paying Living Expenses: Not on file   Food Insecurity:     Worried About Running Out of Food in the Last Year: Not on file    Osito of Food in the Last Year: Not on file   Transportation Needs:     Lack of Transportation (Medical): Not on file    Lack of Transportation (Non-Medical):  Not on file   Physical Activity:     Days of Exercise per Week: Not on file    Minutes of Exercise per Session: Not on file   Stress:     Feeling of Stress : Not on file   Social Connections:     Frequency of Communication with Friends and Family: Not on file    Frequency of Social Gatherings with Friends and Family: Not on file    Attends Yazidi Services: Not on file    Active Member of [address] [place] [address] or Organizations: Not on file    Attends Club or Organization Meetings: Not on file    Marital Status: Not on file   Intimate Partner Violence:     Fear of Current or Ex-Partner: Not on file    Emotionally Abused: Not on file    Physically Abused: Not on file    Sexually Abused: Not on file   Housing Stability:     Unable to Pay for Housing in the Last Year: Not on file    Number of [place] in the Last Year: Not on file    Unstable Housing in the Last Year: Not on file         Family History:   Family History   Problem Relation Age of Onset    Stroke Brother     Hypertension Brother     Diabetes Brother     Hypertension Sister     Hypertension Father     Cancer Father         lung    Hypertension Mother     Heart Disease Mother CHF         Patient Medications:   Current Outpatient Medications   Medication Sig    traZODone (DESYREL) 50 MG tablet Take 1 tablet by mouth nightly    cyclobenzaprine (FLEXERIL) 10 mg tablet Take 1 tablet by mouth 3 times daily as needed for Muscle spasms    atenolol (TENORMIN) 50 MG tablet Take 1 tablet by mouth daily    buPROPion (WELLBUTRIN XL) 150 MG extended release tablet Take 1 tablet by mouth every morning    furosemide (LASIX) 40 MG tablet Take 1 tablet by mouth daily    potassium chloride (KLOR-CON) 8 MEQ extended release tablet Take 1 tablet by mouth 2 times daily TAKE 1 TABLET BY MOUTH TWICE A DAY    RABEprazole (ACIPHEX) 20 MG tablet Take 1 tablet by mouth daily    valsartan (DIOVAN) 320 MG tablet Take 1 tablet by mouth daily    hydrALAZINE (APRESOLINE) 100 MG tablet Take 1 tablet by mouth 3 times daily    aspirin 81 MG EC tablet Take 81 mg by mouth daily    fluticasone (FLONASE) 50 MCG/ACT nasal spray 2 sprays to each nostril QD    meclizine (ANTIVERT) 12.5 MG tablet Take 12.5 mg by mouth 3 times daily as needed     No current facility-administered medications for this visit. REVIEW OF SYSTEMS:      CONSTITUTIONAL:   There is no history of fever, chills, night sweats, weight loss, weight gain, persistent fatigue, or lethargy/hypersomnolence. CARDIAC:+ high BP   No chest pain, pressure, discomfort, palpitations, orthopnea, murmurs, or edema. GI:   No dysphagia, heartburn reflux, nausea/vomiting, diarrhea, abdominal pain, or bleeding. NEURO:   There is no history of AMS, persistent headache, decreased level of consciousness, seizures, or motor or sensory deficits. PHYSICAL EXAM:    Vitals:    06/15/22 0752   BP: 138/86   Pulse: 68   Resp: 15   Temp: 98 °F (36.7 °C)   SpO2: 97%       Physical Exam:  GENERAL APPEARANCE:   The patient is overweight and in no respiratory distress, on RA. HEENT:   PERRL. Conjunctivae unremarkable.    Nasal mucosa is without epistaxis, exudate, or polyps. Gums and dentition are unremarkable. There is no oropharyngeal narrowing. TMs are clear. NECK/LYMPHATIC:   Symmetrical with no elevation of jugular venous pulsation. Trachea midline. No thyroid enlargement. No cervical adenopathy. LUNGS:   Normal respiratory effort with symmetrical lung expansion. Breath sounds clear. HEART:   There is a regular rate and rhythm. No murmur, rub, or gallop. There is no edema in the lower extremities. ABDOMEN:   Soft and non-tender. No hepatosplenomegaly. Bowel sounds are normal.     NEURO:   The patient is alert and oriented to person, place, and time. Memory appears intact and mood is normal.  No gross sensorimotor deficits are present. ASSESSMENT:         Diagnosis Orders   1. MELISSA (obstructive sleep apnea) -continue CPAP, his AHI is well controlled so I will not make any changes on pressure. I will check an ELDA on APAP to ensure adequate oxygenation. I will also trial him on trazodone 50mg qhs. He was advised to contact me if it is not working. Pulse oximetry, overnight   2. Nocturnal hypoxemia -continue APAP, check ELDA on APAP Pulse oximetry, overnight          Medical Decision Making:       Orders:   Orders Placed This Encounter   Procedures    Pulse oximetry, overnight        Dr. Kira Chacon was the onsite physician. He was available to answer any questions.      Plan:  Follow up in the sleep center in 3 months    Time spent in preparation, chart review, imaging review and direct patient care was 26 minutes         CHUCK Hampton  6/15/2022,  Electronically signed

## 2022-06-15 ENCOUNTER — TELEPHONE (OUTPATIENT)
Dept: CARDIOLOGY CLINIC | Age: 57
End: 2022-06-15

## 2022-06-15 ENCOUNTER — PATIENT MESSAGE (OUTPATIENT)
Dept: CARDIOLOGY CLINIC | Age: 57
End: 2022-06-15

## 2022-06-15 ENCOUNTER — OFFICE VISIT (OUTPATIENT)
Dept: SLEEP MEDICINE | Age: 57
End: 2022-06-15
Payer: COMMERCIAL

## 2022-06-15 VITALS
SYSTOLIC BLOOD PRESSURE: 138 MMHG | WEIGHT: 253.9 LBS | TEMPERATURE: 98 F | RESPIRATION RATE: 15 BRPM | DIASTOLIC BLOOD PRESSURE: 86 MMHG | HEIGHT: 75 IN | HEART RATE: 68 BPM | BODY MASS INDEX: 31.57 KG/M2 | OXYGEN SATURATION: 97 %

## 2022-06-15 DIAGNOSIS — G47.34 NOCTURNAL HYPOXEMIA: ICD-10-CM

## 2022-06-15 DIAGNOSIS — G47.33 OSA (OBSTRUCTIVE SLEEP APNEA): Primary | ICD-10-CM

## 2022-06-15 PROCEDURE — 99213 OFFICE O/P EST LOW 20 MIN: CPT | Performed by: PHYSICIAN ASSISTANT

## 2022-06-15 RX ORDER — TRAZODONE HYDROCHLORIDE 50 MG/1
50 TABLET ORAL NIGHTLY
Qty: 30 TABLET | Refills: 2 | Status: SHIPPED | OUTPATIENT
Start: 2022-06-15

## 2022-06-15 ASSESSMENT — SLEEP AND FATIGUE QUESTIONNAIRES
HOW LIKELY ARE YOU TO NOD OFF OR FALL ASLEEP WHILE LYING DOWN TO REST IN THE AFTERNOON WHEN CIRCUMSTANCES PERMIT: 0
HOW LIKELY ARE YOU TO NOD OFF OR FALL ASLEEP WHILE SITTING INACTIVE IN A PUBLIC PLACE: 0
HOW LIKELY ARE YOU TO NOD OFF OR FALL ASLEEP WHILE WATCHING TV: 0
HOW LIKELY ARE YOU TO NOD OFF OR FALL ASLEEP IN A CAR, WHILE STOPPED FOR A FEW MINUTES IN TRAFFIC: 0
ESS TOTAL SCORE: 0
HOW LIKELY ARE YOU TO NOD OFF OR FALL ASLEEP WHILE SITTING AND TALKING TO SOMEONE: 0
HOW LIKELY ARE YOU TO NOD OFF OR FALL ASLEEP WHEN YOU ARE A PASSENGER IN A CAR FOR AN HOUR WITHOUT A BREAK: 0
HOW LIKELY ARE YOU TO NOD OFF OR FALL ASLEEP WHILE SITTING QUIETLY AFTER LUNCH WITHOUT ALCOHOL: 0
HOW LIKELY ARE YOU TO NOD OFF OR FALL ASLEEP WHILE SITTING AND READING: 0

## 2022-06-15 NOTE — TELEPHONE ENCOUNTER
Pt.called back and I told him that Ladi Lujan said the renal artery denervation has not yet been approved by the FDA na dis still in clinical trials. Patient would like to know how/if he could take part in the clinical trial?  Note sent to Ladi Lujan.

## 2022-06-15 NOTE — TELEPHONE ENCOUNTER
Pt.called back and I told him that Darrian Pierce said the renal artery denervation has not yet been approved by the FDA and is still in clinical trials.     Patient would like to know how/if he could take part in the clinical trial?

## 2022-06-15 NOTE — PATIENT INSTRUCTIONS
Patient Education        Insomnia: Care Instructions  Overview     Insomnia is the inability to sleep well. Insomnia may make it hard for you to get to sleep, stay asleep, or sleep as long as you need to. This can make you tired and grouchy during the day. It can also make you forgetful, lesseffective at work, and unhappy. Insomnia can be linked to many things. These include health problems,medicines, and stressful events. Treatment may include treating problems that may be linked with your insomnia. Treatment also includes behavior and lifestyle changes. This may include cognitive-behavioral therapy for insomnia (CBT-I). CBT-I uses different ways to help you change your thoughts and behaviors that may interfere with sleep. Your doctor can recommend specific things you can try. Examples include doing relaxation exercises, keeping regular bedtimes and wake times, limiting alcohol, and making healthy sleep habits. Some people decide to take medicinefor a while to help with sleep. Follow-up care is a key part of your treatment and safety. Be sure to make and go to all appointments, and call your doctor if you are having problems. It's also a good idea to know your test results and keep alist of the medicines you take. How can you care for yourself at home? Cognitive-behavioral therapy for insomnia (CBT-I)   If your doctor recommends CBT-I, follow your treatment plan. Your doctor will give you instructions that are unique for you.  Your plan will likely include a few things that you can try at home. For example:  ? Try meditation or other relaxation techniques before you go to bed. ? Go to bed at the same time every night, and wake up at the same time every morning. Do not take naps during the day. ? Do not stay in bed awake for too long.  If you can't fall asleep, or if you wake up in the middle of the night and can't get back to sleep within about 15 to 20 minutes, get out of bed and go to another room until you feel sleepy. ? If watching the clock makes you anxious, turn it facing away from you so you cannot see the time. ? If you worry when you lie down, start a worry book. Well before bedtime, write down your worries, and then set the book and your concerns aside. Healthy sleep habits   If your doctor recommends it, try making healthy sleep habits. For example:  ? Keep your bedroom quiet, dark, and cool. ? Do not have drinks with caffeine, such as coffee or black tea, for 8 hours before bed. ? Do not smoke or use other types of tobacco near bedtime. Nicotine is a stimulant and can keep you awake. ? Avoid drinking alcohol late in the evening, because it can cause you to wake in the middle of the night. ? Do not eat a big meal close to bedtime. If you are hungry, eat a light snack. ? Do not drink a lot of water close to bedtime, because the need to urinate may wake you up during the night. ? Do not read, watch TV, or use your phone in bed. Use the bed only for sleeping and sex. Medicine   Be safe with medicines. Take your medicines exactly as prescribed. Call your doctor if you think you are having a problem with your medicine.  Talk with your doctor before you try an over-the-counter medicine, herbal product, or supplement to try to improve your sleep. Your doctor can recommend how much to take and when to take it. Make sure your doctor knows all of the medicines, vitamins, herbal products, and supplements you take.  You will get more details on the specific medicines your doctor prescribes. When should you call for help? Watch closely for changes in your health, and be sure to contact your doctor if:     Your efforts to improve your sleep do not work.      Your insomnia gets worse.      You have been feeling down, depressed, or hopeless or have lost interest in things that you usually enjoy. Where can you learn more? Go to [email or web address] and sign in to your MyChart account. Enter P513 in the [facility] box to learn more about \"Insomnia: Care Instructions. \"     If you do not have an account, please click on the \"Sign Up Now\" link. Current as of: June 16, 2021               Content Version: 13.2  © 6226-4565 [employer]. Care instructions adapted under license by [facility] ([facility]). If you have questions about a medical condition or this instruction, always ask your healthcare professional. [address] any warranty or liability for your use of this information.

## 2022-07-07 ENCOUNTER — OFFICE VISIT (OUTPATIENT)
Dept: UROLOGY | Age: 57
End: 2022-07-07
Payer: COMMERCIAL

## 2022-07-07 DIAGNOSIS — R39.15 URINARY URGENCY: ICD-10-CM

## 2022-07-07 DIAGNOSIS — N20.0 KIDNEY STONE: ICD-10-CM

## 2022-07-07 DIAGNOSIS — Z12.5 SCREENING PSA (PROSTATE SPECIFIC ANTIGEN): ICD-10-CM

## 2022-07-07 DIAGNOSIS — N20.1 LEFT URETERAL STONE: Primary | ICD-10-CM

## 2022-07-07 DIAGNOSIS — R35.0 URINARY FREQUENCY: ICD-10-CM

## 2022-07-07 DIAGNOSIS — N52.9 ERECTILE DYSFUNCTION, UNSPECIFIED ERECTILE DYSFUNCTION TYPE: ICD-10-CM

## 2022-07-07 LAB
BILIRUBIN, URINE, POC: NEGATIVE
BLOOD URINE, POC: NEGATIVE
GLUCOSE URINE, POC: NEGATIVE
KETONES, URINE, POC: NEGATIVE
LEUKOCYTE ESTERASE, URINE, POC: NEGATIVE
NITRITE, URINE, POC: NEGATIVE
PH, URINE, POC: 6 (ref 4.6–8)
PROTEIN,URINE, POC: NEGATIVE
PVR, POC: ABNORMAL CC
SPECIFIC GRAVITY, URINE, POC: 1.02 (ref 1–1.03)
URINALYSIS CLARITY, POC: ABNORMAL
URINALYSIS COLOR, POC: ABNORMAL
UROBILINOGEN, POC: ABNORMAL

## 2022-07-07 PROCEDURE — 99204 OFFICE O/P NEW MOD 45 MIN: CPT | Performed by: NURSE PRACTITIONER

## 2022-07-07 PROCEDURE — 51798 US URINE CAPACITY MEASURE: CPT | Performed by: NURSE PRACTITIONER

## 2022-07-07 PROCEDURE — 81003 URINALYSIS AUTO W/O SCOPE: CPT | Performed by: NURSE PRACTITIONER

## 2022-07-07 PROCEDURE — 74018 RADEX ABDOMEN 1 VIEW: CPT | Performed by: NURSE PRACTITIONER

## 2022-07-07 ASSESSMENT — ENCOUNTER SYMPTOMS
EYES NEGATIVE: 1
RESPIRATORY NEGATIVE: 1
BACK PAIN: 1

## 2022-07-07 NOTE — PROGRESS NOTES
[facility]  [address] [facility]    [address] [facility] [address], [address] [facility] [facility]  [phone]          Jordi Finn  : 1965    Chief Complaint   Patient presents with    Nephrolithiasis    New Patient          LIAM Finn is a 60 y.o. male w hx of HTN and kidney stone being seen today as a new pt referred by PCP for kidney stone. Presented to [facility] ER on  w c/o left flank pain. CT abd pelvis wo contrast revealed 4 mm left UVJ stone w hydronephrosis, lynda punctate non obstructing stones, and BWT. Images are NA. Cr 1.42. WBC 6.4. Discharged home w scripts for flomax, Norco, and Zofran. Today, pt is asx. Reports he passed stone shortly after ER visit. KUB in office today reviewed by myself and shows lynda renal stones. Has hx of stones requiring ESWL w stent many yrs ago. Has made several dietary changes. Reports he does have UF and UU mostly in am, weak stream in the afternoon, and nocturia 1-3. Reports he was prev followed by our office in early  and was dx w BPH. Was on flomax for a period of time, however this was discontinued at some point for unknown reason. No records available. Reports ED for approx 1 yr. He is on several BP meds that are currently being adjusted by cardiology and feels this is affecting his ability for erection. Not on NTG. No PSA on file. Can not recall last KIERA. No family hx of  Ca. Cr 1.30 on 22 after stone passage. PVR 99 cc via US in office today.                Past Medical History:   Diagnosis Date    Adverse effect of anesthesia     after hernia surgery in 2016 here at [facility] \"I had trouble breathing and I blacked out\"    BMI 31.0-31.9,adult     Chronic pain     DDD (degenerative disc disease), cervical 2016    several surgeries to correct    GERD (gastroesophageal reflux disease)     seldom     History of kidney stones     numerous, lithotripsy x 1    Hypertension     controlled with meds    Kidney stone     Moderate single current episode of major depressive disorder ([facility] [address] 3/16/2018    Nausea & vomiting     sometimes after anesthesia    OA (osteoarthritis)     PUD (peptic ulcer disease)     no recent episodes    Sleep apnea     c-pap    Thromboembolus ([facility] [address] 2010    left knee s/p knee arth    Thromboembolus ([facility] [address] 2007    s/p lithotripsy-     Vertigo      Past Surgical History:   Procedure Laterality Date    APPENDECTOMY  as teen    Izabela Schwartz CERVICAL DISCECTOMY  2015   [address]    x 3 fusion    CHOLECYSTECTOMY, LAPAROSCOPIC  2011    HERNIA REPAIR      umbilical     HERNIA REPAIR Bilateral 2016    KNEE ARTHROSCOPY Left     left knee x 2    LITHOTRIPSY      LUMBAR LAMINECTOMY  1995    lumbar laminectomy    LYSIS OF ADHESIONS  2012    ORTHOPEDIC SURGERY Left 12/2017    Left knee manipulation    [address]Y  2014   [address]    TOTAL KNEE ARTHROPLASTY  2018    left     TOTAL KNEE ARTHROPLASTY Left 10/09/2017     Current Outpatient Medications   Medication Sig Dispense Refill    traZODone (DESYREL) 50 MG tablet Take 1 tablet by mouth nightly 30 tablet 2    cyclobenzaprine (FLEXERIL) 10 mg tablet Take 1 tablet by mouth 3 times daily as needed for Muscle spasms 20 tablet 1    atenolol (TENORMIN) 50 MG tablet Take 1 tablet by mouth daily 90 tablet 3    buPROPion (WELLBUTRIN XL) 150 MG extended release tablet Take 1 tablet by mouth every morning 90 tablet 3    furosemide (LASIX) 40 MG tablet Take 1 tablet by mouth daily 90 tablet 3    potassium chloride (KLOR-CON) 8 MEQ extended release tablet Take 1 tablet by mouth 2 times daily TAKE 1 TABLET BY MOUTH TWICE A  tablet 2    RABEprazole (ACIPHEX) 20 MG tablet Take 1 tablet by mouth daily 90 tablet 3    valsartan (DIOVAN) 320 MG tablet Take 1 tablet by mouth daily 90 tablet 3    hydrALAZINE (APRESOLINE) 100 MG tablet Take 1 tablet by mouth 3 times daily 90 tablet 11    aspirin 81 MG EC tablet Take 81 mg by mouth daily      fluticasone (FLONASE) 50 MCG/ACT nasal spray 2 sprays to each nostril QD      meclizine (ANTIVERT) 12.5 MG tablet Take 12.5 mg by mouth 3 times daily as needed       No current facility-administered medications for this visit. Allergies   Allergen Reactions    Codeine Itching     Social History     Socioeconomic History    Marital status:      Spouse name: Not on file    Number of children: Not on file    Years of education: Not on file    Highest education level: Not on file   Occupational History    Not on file   Tobacco Use    Smoking status: Never Smoker    Smokeless tobacco: Never Used   Substance and Sexual Activity    Alcohol use: Yes     Alcohol/week: 0.0 standard drinks    Drug use: No    Sexual activity: Not on file     Comment: wife   Other Topics Concern    Not on file   Social History Narrative    Has 2 stepdaughters as well       Social Determinants of Health     Financial Resource Strain:     Difficulty of Paying Living Expenses: Not on file   Food Insecurity:     Worried About Running Out of Food in the Last Year: Not on file    Osito of Food in the Last Year: Not on file   Transportation Needs:     Lack of Transportation (Medical): Not on file    Lack of Transportation (Non-Medical):  Not on file   Physical Activity:     Days of Exercise per Week: Not on file    Minutes of Exercise per Session: Not on file   Stress:     Feeling of Stress : Not on file   Social Connections:     Frequency of Communication with Friends and Family: Not on file    Frequency of Social Gatherings with Friends and Family: Not on file    Attends [employer] Services: Not on file    Active Member of Clubs or Organizations: Not on file    Attends Club or Organization Meetings: Not on file    Marital Status: Not on file   Intimate Partner Violence:     Fear of Current or Ex-Partner: Not on file    Emotionally Abused: Not on file    Physically Abused: Not on file    Sexually Abused: Not on file   Housing Stability:     Unable to Pay for Housing in the Last Year: Not on file    Number of Places Lived in the Last Year: Not on file    Unstable Housing in the Last Year: Not on file     Family History   Problem Relation Age of Onset    Stroke Brother     Hypertension Brother     Diabetes Brother     Hypertension Sister     Hypertension Father     Cancer Father         lung    Hypertension Mother     Heart Disease Mother         CHF       Review of Systems  Skin: Negative skin ROS  Eyes: Eyes negative  ENT: Positive for high frequency hearing loss and dry mouth. Respiratory: Respiratory negative  Cardiovascular: Neg cardio ROSPositive for hypertension and leg swelling. GI: Neg GI ROS  Genitourinary: Positive for history of urolithiasis, nocturia, slower stream, incomplete emptying and erectile dysfunction. Musculoskeletal: Positive for back pain, arthralgias and neck pain. Neurological: Positive for dizziness and numbness. Psychological: Neg psych ROS  Endocrine: Positive for fatigue and heat intolerance. Hem/Lymphatic: Hematologic/lymphatic negativePositive for easy bruising.       Urinalysis  UA - Dipstick  Results for orders placed or performed in visit on 07/07/22   AMB POC URINALYSIS DIP STICK AUTO W/O MICRO   Result Value Ref Range    Color (UA POC)      Clarity (UA POC)      Glucose, Urine, POC Negative Negative    Bilirubin, Urine, POC Negative Negative    KETONES, Urine, POC Negative Negative    Specific Gravity, Urine, POC 1.020 1.001 - 1.035    Blood (UA POC) Negative Negative    pH, Urine, POC 6.0 4.6 - 8.0    Protein, Urine, POC Negative Negative    Urobilinogen, POC 2 mg/dL     Nitrite, Urine, POC Negative Negative    Leukocyte Esterase, Urine, POC Negative Negative       PHYSICAL EXAM    General appearance - well appearing and in no distress  Mental status - alert, oriented to person, place, and time  Neck - supple, no significant adenopathy  Chest/Lung-  Quiet, even and easy respiratory effort without use of accessory muscles  Skin - normal coloration and turgor, no rashes        Assessment and Plan    ICD-10-CM    1. Left ureteral stone  T28.8 Basic Metabolic Panel     AMB POC PVR, CRYSTAL,POST-VOID RES,US,NON-IMAGING   2. Kidney stone  N20.0 AMB POC XRAY ABDOMEN 1 VIEW     AMB POC URINALYSIS DIP STICK AUTO W/O MICRO     AMB POC PVR, CRYSTAL,POST-VOID RES,US,NON-IMAGING   3. Erectile dysfunction, unspecified erectile dysfunction type  N52.9 AMB POC PVR, CRYSTAL,POST-VOID RES,US,NON-IMAGING   4. Urinary urgency  R39.15    5. Urinary frequency  R35.0    6. Screening PSA (prostate specific antigen)  Z12.5 PSA, Diagnostic   Left ureteral stone has passed. Cr normal. Stone prevention discussed. Discussed tx options for renals stones including observation w KUB vs ESWL. Pt opts for observation. Risks, benefits, and alternatives reviewed. Will reach out to Dr. Burgess Bill for permission to start viagra. Pt will be notified of response. Sx sound consistent w BPH. Will check PSA and KIERA in 3 months. Consider flomax if sx worsen. ROV in 3 months w PSA 1 week prior. To call sooner if needed. Gena Carolina is supervising physician today and he approves plan of care. Addendum on 7/8/22 at 735:  Received ok for pt to begin viagra per Dr. Burgess Bill w [facility] cardiology. Will send script.      Nico Elizabeth, APRN - CNP

## 2022-07-08 RX ORDER — SILDENAFIL 100 MG/1
100 TABLET, FILM COATED ORAL PRN
Qty: 10 TABLET | Refills: 0 | Status: SHIPPED | OUTPATIENT
Start: 2022-07-08

## 2022-07-12 LAB
ALBUMIN SERPL-MCNC: 3.9 G/DL (ref 3.5–5)
ALBUMIN/GLOB SERPL: 1.4 {RATIO} (ref 1.1–2.2)
ALP SERPL-CCNC: 44 U/L (ref 50–136)
ALT SERPL-CCNC: 40 U/L (ref 30–65)
ANION GAP SERPL CALC-SCNC: 7 MMOL/L (ref 5–15)
AST SERPL-CCNC: 21 U/L (ref 15–37)
BILIRUB SERPL-MCNC: 0.5 MG/DL
BUN SERPL-MCNC: 15 MG/DL (ref 6–20)
CALCIUM SERPL-MCNC: 9 MG/DL (ref 8.5–10.1)
CHLORIDE SERPL-SCNC: 106 MMOL/L (ref 97–108)
CO2 SERPL-SCNC: 28 MMOL/L (ref 21–32)
CREAT SERPL-MCNC: 1.3 MG/DL (ref 0.7–1.3)
GLOBULIN SER CALC-MCNC: 2.7 G/DL (ref 2–4)
GLUCOSE SERPL-MCNC: 94 MG/DL (ref 50–100)
POTASSIUM SERPL-SCNC: 3.9 MMOL/L (ref 3.5–5.1)
PROT SERPL-MCNC: 6.6 G/DL (ref 6.4–8.2)
SODIUM SERPL-SCNC: 141 MMOL/L (ref 136–145)

## 2022-07-13 ENCOUNTER — TELEPHONE (OUTPATIENT)
Dept: SLEEP MEDICINE | Age: 57
End: 2022-07-13

## 2022-07-13 NOTE — TELEPHONE ENCOUNTER
Contact pt and lvm ----- Message from CHUCK Hayes sent at 7/12/2022  1:38 PM EDT -----  Please call pt to let him know that his ELDA is normal on CPAP. Thank you!

## 2022-07-17 ENCOUNTER — TELEPHONE (OUTPATIENT)
Dept: FAMILY MEDICINE CLINIC | Facility: CLINIC | Age: 57
End: 2022-07-17

## 2022-07-17 DIAGNOSIS — U07.1 COVID: Primary | ICD-10-CM

## 2022-07-18 ENCOUNTER — TELEPHONE (OUTPATIENT)
Dept: FAMILY MEDICINE CLINIC | Facility: CLINIC | Age: 57
End: 2022-07-18

## 2022-07-18 NOTE — TELEPHONE ENCOUNTER
I spoke with the patient yesterday and he had 2 positive COVID tests yesterday. I sent in Paxlovid for him. COVID can make you very congested and I think that is why he is so congested. Antibiotics are not appropriate. He needs to take the antiviral treatment Paxlovid, and use Sudafed for the congestion.

## 2022-07-18 NOTE — TELEPHONE ENCOUNTER
Pt called stating that he spoke with doctor on call over the weekend. Pt states that he has tested for COVID and tested negative. Pt states that he has colored mucus and thinks its an infection. Pt is wanting an antibiotic to be called in. Please advise.

## 2023-02-24 ENCOUNTER — OFFICE VISIT (OUTPATIENT)
Dept: CARDIOLOGY CLINIC | Age: 58
End: 2023-02-24

## 2023-02-24 VITALS
SYSTOLIC BLOOD PRESSURE: 170 MMHG | DIASTOLIC BLOOD PRESSURE: 110 MMHG | HEART RATE: 64 BPM | WEIGHT: 260 LBS | BODY MASS INDEX: 32.33 KG/M2 | HEIGHT: 75 IN

## 2023-02-24 DIAGNOSIS — I89.0 LYMPHEDEMA, NOT ELSEWHERE CLASSIFIED: ICD-10-CM

## 2023-02-24 DIAGNOSIS — I10 ESSENTIAL (PRIMARY) HYPERTENSION: ICD-10-CM

## 2023-02-24 DIAGNOSIS — Z86.718 PERSONAL HISTORY OF OTHER VENOUS THROMBOSIS AND EMBOLISM: Primary | ICD-10-CM

## 2023-02-24 RX ORDER — SPIRONOLACTONE 25 MG/1
50 TABLET ORAL DAILY
Qty: 60 TABLET | Refills: 11 | Status: SHIPPED | OUTPATIENT
Start: 2023-02-24 | End: 2024-02-24

## 2023-02-24 RX ORDER — SPIRONOLACTONE 25 MG/1
25 TABLET ORAL DAILY
COMMUNITY
Start: 2022-11-04 | End: 2023-02-24 | Stop reason: SDUPTHER

## 2023-02-24 NOTE — PROGRESS NOTES
[address], [address] [place] [address], [address] [place] [address]  PHONE: [phone]    SUBJECTIVE: Augustina Perez (1965) is a 60 y.o. male seen for a follow up visit regarding the following:        ICD-10-CM ICD-9-CM   1. Essential hypertension  I10 401.9   2. History of DVT (deep vein thrombosis)  Z86.718 V12.51   3. Lymphedema  I89.0 457.1         05/13/22      Pt presents for follow up, feeling well overall today. Denies chest pain or palpitations. Pt reports taking spironolactone for 5 days since last visit and experienced bilateral LE edema so the patient d/c the medication. With history of DVT LE US from 1/20/22 - normal LE US and bilateral calculi seen on renal US, no hydronephrosis    ECHO on 1/12/22 shows left ventricle size is normal. Mildly increased wall thickness. Normal wall motion. Normal left ventricular systolic function with a visually estimated EF of 60 - 65%. Normal diastolic function. EKG 1/3/22 shows normal sinus rhythm. BP continues to run quite high. Pt checks BP at home and endorses similar values to clinic (160-170's over 's). Norvasc previously made LE edema worse. Pt has not been back to the lymphedema clinic in some time. Pt is interested in using an at home LE compression device. Since 2 knee replacements his edema is worse. Uses CPAP machine, following up with sleep medicine. Patient had iatrogenic swelling from both Norvasc and Aldactone    05/13/22  Pt doing well. No chest pain. No palpitations. Patient denies syncope. No dyspnea. States they are taking meds. Maintains a normal level of activity for them without symptoms. No dizziness or lightheadedness. All above conditions stable. With today's blood pressure reading it appears as though his hydralazine has positively affected our overall hypertension management.   I have discussed that if he has elevated blood pressures at home an extra dose of hydralazine is reasonable and appropriate  Past Medical History, Past Surgical History, Family history, Social History, and Medications were all reviewed with the patient today and updated as necessary. 2/24/23  Reports BP at home is better than office reading.   Patient currently taking Aldactone 25 mg p.o. daily and hydralazine 100 mg twice a day      Allergies   Allergen Reactions    Codeine Itching     Past Medical History:   Diagnosis Date    Adverse effect of anesthesia     after hernia surgery in 2016 here at [address] \"I had trouble breathing and I blacked out\"    BMI 31.0-31.9,adult     Chronic pain     DDD (degenerative disc disease), cervical 07/20/2016    several surgeries to correct    GERD (gastroesophageal reflux disease)     seldom     History of kidney stones     numerous, lithotripsy x 1    Hypertension     controlled with meds    Moderate single current episode of major depressive disorder ([address] 3/16/2018    Nausea & vomiting     sometimes after anesthesia    OA (osteoarthritis)     PUD (peptic ulcer disease)     no recent episodes    Sleep apnea     c-pap    Thromboembolus ([address] 2010    left knee s/p knee arth    Thromboembolus ([address] 2007    s/p lithotripsy-     Vertigo      Past Surgical History:   Procedure Laterality Date    HX APPENDECTOMY  as teen     HX CERVICAL DISKECTOMY  2015    HX CERVICAL FUSION  1998    x 3 fusion    HX HERNIA REPAIR Bilateral 2016    HX HERNIA REPAIR      umbilical     HX KNEE ARTHROSCOPY Left     left knee x 2    HX KNEE REPLACEMENT Left 10/09/2017    HX KNEE REPLACEMENT  2018    left     HX LAP CHOLECYSTECTOMY  2011    HX LITHOTRIPSY      HX LUMBAR LAMINECTOMY  1995    lumbar laminectomy    HX LYSIS OF ADHESIONS  2012    HX ORTHOPAEDIC Left 12/2017    Left knee manipulation    HX SEPTOPLASTY  2014    HX TONSILLECTOMY  1993     Family History   Problem Relation Age of Onset    Heart Disease Mother         CHF    Hypertension Mother     Cancer Father         lung    Hypertension Father Hypertension Sister     Diabetes Brother     Stroke Brother     Hypertension Brother       Social History     Tobacco Use    Smoking status: Never Smoker    Smokeless tobacco: Never Used   Substance Use Topics    Alcohol use: Yes     Alcohol/week: 0.0 standard drinks     Types: 1 - 2 Cans of beer per week     Comment: about 4 drinks every 2 weeks     Pt does   have history of early onset cad or heart failure in immediate family members    Current Outpatient Medications:     valsartan (DIOVAN) 320 mg tablet, Take 1 Tablet by mouth daily. , Disp: 90 Tablet, Rfl: 3    hydrALAZINE (APRESOLINE) 50 mg tablet, Take 1 Tablet by mouth three (3) times daily. , Disp: 270 Tablet, Rfl: 3    RABEprazole (ACIPHEX) 20 mg TbEC, TAKE ONE TABLET BY MOUTH ONE TIME DAILY, Disp: 90 Tablet, Rfl: 3    cyclobenzaprine (FLEXERIL) 10 mg tablet, Take 1 Tablet by mouth three (3) times daily as needed for Muscle Spasm(s). , Disp: 20 Tablet, Rfl: 1    meloxicam (MOBIC) 7.5 mg tablet, Take 1 Tablet by mouth two (2) times a day., Disp: 60 Tablet, Rfl: 0    fluticasone propionate (Flonase) 50 mcg/actuation nasal spray, 2 sprays to each nostril QD, Disp: 1 Each, Rfl: 11    meclizine (ANTIVERT) 12.5 mg tablet, Take 1 Tablet by mouth three (3) times daily as needed for Dizziness. , Disp: 30 Tablet, Rfl: 3    ondansetron (Zofran ODT) 8 mg disintegrating tablet, Take 1 Tablet by mouth every eight (8) hours as needed for Nausea., Disp: 10 Tablet, Rfl: 0    atenoloL (TENORMIN) 50 mg tablet, Take 1 Tablet by mouth daily. , Disp: 90 Tablet, Rfl: 3    buPROPion XL (WELLBUTRIN XL) 150 mg tablet, Take 1 Tablet by mouth every morning., Disp: 90 Tablet, Rfl: 3    furosemide (LASIX) 40 mg tablet, Take 1 Tablet by mouth daily. , Disp: 90 Tablet, Rfl: 3    potassium chloride SR (KLOR-CON 8) 8 mEq tablet, TAKE 1 TABLET BY MOUTH TWICE A DAY, Disp: 180 Tablet, Rfl: 3    aspirin delayed-release 81 mg tablet, Take 81 mg by mouth daily. , Disp: , Rfl:     cpap machine kit, by Does Not Apply route. 12 cm, Disp: , Rfl:         ROS:  Review of Systems - General ROS: negative for - chills, fatigue or fever  Hematological and Lymphatic ROS: negative for - bleeding problems, bruising or jaundice  Respiratory ROS: no cough, shortness of breath, or wheezing  Cardiovascular ROS: no chest pain or dyspnea on exertion  Gastrointestinal ROS: no abdominal pain, change in bowel habits, or black or bloody stools  Neurological ROS: no TIA or stroke symptoms  All other systems negative. Wt Readings from Last 3 Encounters:   05/13/22 259 lb (117.5 kg)   03/25/22 262 lb 12.8 oz (119.2 kg)   03/07/22 262 lb (118.8 kg)     Temp Readings from Last 3 Encounters:   03/25/22 97.2 °F (36.2 °C) (Temporal)   03/07/22 97.1 °F (36.2 °C)   11/17/21 98.2 °F (36.8 °C)     BP Readings from Last 3 Encounters:   05/13/22 138/80   03/25/22 (!) 140/82   03/07/22 (!) 160/84     Pulse Readings from Last 3 Encounters:   05/13/22 66   03/25/22 64   03/07/22 87           PHYSICAL EXAM:  Visit Vitals  /80   Pulse 66   Ht 6' 3\" (1.905 m)   Wt 259 lb (117.5 kg)   BMI 32.37 kg/m²       Physical Examination: General appearance - alert, well appearing, and in no distress  Mental status - alert, oriented to person, place, and time  Eyes - pupils equal and reactive, extraocular eye movements intact  Neck/lymph - supple, no significant adenopathy  Chest/lungs - clear to auscultation, no wheezes, rales or rhonchi, symmetric air entry  Heart/CV - normal rate, regular rhythm, normal S1, S2, no murmurs, rubs, clicks or gallops  Abdomen/GI - soft, nontender, nondistended, no masses or organomegaly  Musculoskeletal - no joint tenderness, deformity or swelling  Extremities - peripheral pulses normal, pedal edema, no clubbing or cyanosis  Skin - normal coloration and turgor, no rashes, no suspicious skin lesions noted    EKG: normal EKG, normal sinus rhythm, unchanged from previous tracings.                    Medications reviewed and questions answered    No results found for this or any previous visit (from the past 672 hour(s)). Lab Results   Component Value Date/Time    Cholesterol, total 231 (H) 03/26/2021 08:40 AM    HDL Cholesterol 46 03/26/2021 08:40 AM    LDL, calculated 155 (H) 03/26/2021 08:40 AM    LDL, calculated 137 (H) 08/03/2018 04:08 PM    VLDL, calculated 30 03/26/2021 08:40 AM    VLDL, calculated 34 08/03/2018 04:08 PM    Triglyceride 165 (H) 03/26/2021 08:40 AM     01/12/22    ECHO ADULT COMPLETE 01/13/2022 1/13/2022    Interpretation Summary    Left Ventricle: Left ventricle size is normal. Mildly increased wall thickness. Normal wall motion. Normal left ventricular systolic function with a visually estimated EF of 60 - 65%. Normal diastolic function. Signed by: Ronel Leon MD on 1/13/2022  7:32 PM        ASSESSMENT and PLAN        1. Essential hypertension  - BP continues to be elevated - 176/100  - AMB POC EKG ROUTINE W/ 12 LEADS, INTER & REP    2. History of DVT (deep vein thrombosis)    ECHO on 1/12/22 shows left ventricle size is normal. Mildly increased wall thickness. Normal wall motion. Normal left ventricular systolic function with a visually estimated EF of 60 - 65%. Normal diastolic function. EKG 1/3/22 shows normal sinus rhythm  1/20/22 - normal LE US and bilateral calculi seen on renal US, no hydronephrosis    2/24/23  Overall patient appears to be doing much better than in previous visits   he is really on much more minimal medical regimen his blood pressure at home is a little higher than I would like and sometimes people can be responsive to higher doses of Aldactone I have suggested he try 50 mg a day versus all of the other litany of medications that he has been on in the past he can increase to 50 a day of Aldactone and report and over the next couple weeks by phone and we will follow-up in 6 months.   We will have patient increase Aldactone to 50 mg p.o. daily to see if we can get a little bit better blood pressure management he still though has      There appears to be no secondary causes of patient's hypertension. With his inability to take Norvasc and inability to take Aldactone we are left with adding hydralazine we will give this an attempt and then I have recommended searching for nonpharmacologic methods that may impact blood pressure such as biofeedback meditation etc. discussed the possibility that renal artery denervation may exist as an option    Orders Placed This Encounter    valsartan (DIOVAN) 320 mg tablet     Sig: Take 1 Tablet by mouth daily. Dispense:  90 Tablet     Refill:  3    hydrALAZINE (APRESOLINE) 50 mg tablet     Sig: Take 1 Tablet by mouth three (3) times daily. Dispense:  270 Tablet     Refill:  3       Pt is instructed to follow all appropriate cardiac risk factor recommendations and to be compliant with meds and testing. Instructed to follow up appropriately and seek urgent medical care if acute or unstable issues arise. Results of some tests may be viewed thru [address] but this does not substitute for follow up with MD. If follow up is not scheduled pt is instructed to call for follow up      Follow-up and Dispositions    Return in about 6 months (around 11/13/2022).        Continue current medical regimen including the hydralazine at 50 mg 3 times daily and follow-up in 6 months time        Milagros Su MD  5/13/2022  9:23 AM

## 2023-03-29 ENCOUNTER — OFFICE VISIT (OUTPATIENT)
Dept: FAMILY MEDICINE CLINIC | Facility: CLINIC | Age: 58
End: 2023-03-29
Payer: COMMERCIAL

## 2023-03-29 VITALS
DIASTOLIC BLOOD PRESSURE: 92 MMHG | HEART RATE: 76 BPM | TEMPERATURE: 99 F | BODY MASS INDEX: 32.33 KG/M2 | RESPIRATION RATE: 16 BRPM | OXYGEN SATURATION: 98 % | HEIGHT: 75 IN | WEIGHT: 260 LBS | SYSTOLIC BLOOD PRESSURE: 169 MMHG

## 2023-03-29 DIAGNOSIS — H81.03 MENIERE'S DISEASE OF BOTH EARS: ICD-10-CM

## 2023-03-29 DIAGNOSIS — F32.1 MODERATE SINGLE CURRENT EPISODE OF MAJOR DEPRESSIVE DISORDER (HCC): ICD-10-CM

## 2023-03-29 DIAGNOSIS — Z11.4 SCREENING FOR HIV (HUMAN IMMUNODEFICIENCY VIRUS): ICD-10-CM

## 2023-03-29 DIAGNOSIS — Z00.00 ROUTINE GENERAL MEDICAL EXAMINATION AT A HEALTH CARE FACILITY: Primary | ICD-10-CM

## 2023-03-29 DIAGNOSIS — L30.9 DERMATITIS: ICD-10-CM

## 2023-03-29 DIAGNOSIS — R53.82 CHRONIC FATIGUE: ICD-10-CM

## 2023-03-29 DIAGNOSIS — I10 ESSENTIAL HYPERTENSION: ICD-10-CM

## 2023-03-29 DIAGNOSIS — K21.9 GASTROESOPHAGEAL REFLUX DISEASE WITHOUT ESOPHAGITIS: ICD-10-CM

## 2023-03-29 DIAGNOSIS — E66.09 CLASS 1 OBESITY DUE TO EXCESS CALORIES WITH SERIOUS COMORBIDITY AND BODY MASS INDEX (BMI) OF 32.0 TO 32.9 IN ADULT: ICD-10-CM

## 2023-03-29 DIAGNOSIS — G47.33 OSA (OBSTRUCTIVE SLEEP APNEA): ICD-10-CM

## 2023-03-29 PROCEDURE — 3077F SYST BP >= 140 MM HG: CPT | Performed by: FAMILY MEDICINE

## 2023-03-29 PROCEDURE — 99214 OFFICE O/P EST MOD 30 MIN: CPT | Performed by: FAMILY MEDICINE

## 2023-03-29 PROCEDURE — 3080F DIAST BP >= 90 MM HG: CPT | Performed by: FAMILY MEDICINE

## 2023-03-29 PROCEDURE — 99396 PREV VISIT EST AGE 40-64: CPT | Performed by: FAMILY MEDICINE

## 2023-03-29 RX ORDER — ONDANSETRON 4 MG/1
4 TABLET, ORALLY DISINTEGRATING ORAL 3 TIMES DAILY PRN
Qty: 21 TABLET | Refills: 5 | Status: SHIPPED | OUTPATIENT
Start: 2023-03-29

## 2023-03-29 RX ORDER — PANTOPRAZOLE SODIUM 40 MG/1
40 TABLET, DELAYED RELEASE ORAL DAILY
Qty: 90 TABLET | Refills: 3 | Status: SHIPPED | OUTPATIENT
Start: 2023-03-29

## 2023-03-29 RX ORDER — LABETALOL 200 MG/1
200 TABLET, FILM COATED ORAL 2 TIMES DAILY
COMMUNITY

## 2023-03-29 RX ORDER — MECLIZINE HCL 12.5 MG/1
12.5 TABLET ORAL 3 TIMES DAILY PRN
Qty: 30 TABLET | Refills: 11 | Status: SHIPPED | OUTPATIENT
Start: 2023-03-29

## 2023-03-29 RX ORDER — RABEPRAZOLE SODIUM 20 MG/1
20 TABLET, DELAYED RELEASE ORAL DAILY
Qty: 90 TABLET | Refills: 3 | Status: CANCELLED | OUTPATIENT
Start: 2023-03-29

## 2023-03-29 RX ORDER — BUPROPION HYDROCHLORIDE 150 MG/1
150 TABLET ORAL EVERY MORNING
Qty: 90 TABLET | Refills: 3 | Status: SHIPPED | OUTPATIENT
Start: 2023-03-29

## 2023-03-29 SDOH — ECONOMIC STABILITY: FOOD INSECURITY: WITHIN THE PAST 12 MONTHS, YOU WORRIED THAT YOUR FOOD WOULD RUN OUT BEFORE YOU GOT MONEY TO BUY MORE.: NEVER TRUE

## 2023-03-29 SDOH — ECONOMIC STABILITY: INCOME INSECURITY: HOW HARD IS IT FOR YOU TO PAY FOR THE VERY BASICS LIKE FOOD, HOUSING, MEDICAL CARE, AND HEATING?: NOT HARD AT ALL

## 2023-03-29 SDOH — ECONOMIC STABILITY: HOUSING INSECURITY
IN THE LAST 12 MONTHS, WAS THERE A TIME WHEN YOU DID NOT HAVE A STEADY PLACE TO SLEEP OR SLEPT IN A SHELTER (INCLUDING NOW)?: NO

## 2023-03-29 SDOH — ECONOMIC STABILITY: FOOD INSECURITY: WITHIN THE PAST 12 MONTHS, THE FOOD YOU BOUGHT JUST DIDN'T LAST AND YOU DIDN'T HAVE MONEY TO GET MORE.: NEVER TRUE

## 2023-03-29 NOTE — PROGRESS NOTES
arthroscopy (Left); Lithotripsy; orthopedic surgery (Left, 12/2017); and Total knee arthroplasty (Left, 10/09/2017). Mr. Tayler Barker   Current Outpatient Medications   Medication Sig Dispense Refill    buPROPion (WELLBUTRIN XL) 150 MG extended release tablet Take 1 tablet by mouth every morning 90 tablet 3    meclizine (ANTIVERT) 12.5 MG tablet Take 1 tablet by mouth 3 times daily as needed for Dizziness or Nausea 30 tablet 11    labetalol (NORMODYNE) 200 MG tablet Take 200 mg by mouth 2 times daily      ondansetron (ZOFRAN-ODT) 4 MG disintegrating tablet Take 1 tablet by mouth 3 times daily as needed for Nausea or Vomiting 21 tablet 5    pantoprazole (PROTONIX) 40 MG tablet Take 1 tablet by mouth daily 90 tablet 3    spironolactone (ALDACTONE) 25 MG tablet Take 2 tablets by mouth daily 60 tablet 11    aspirin 81 MG EC tablet Take 81 mg by mouth daily      fluticasone (FLONASE) 50 MCG/ACT nasal spray 2 sprays to each nostril QD       No current facility-administered medications for this visit. Mr. Tayler Barker  reports that he has never smoked. He has never used smokeless tobacco. He reports current alcohol use. He reports that he does not use drugs. Mr. Tayler Barker family history includes Cancer in his father; Diabetes in his brother; Heart Disease in his mother; Hypertension in his brother, father, mother, and sister; Stroke in his brother. Allergies   Allergen Reactions    Codeine Itching         BP (!) 169/92   Pulse 76   Temp 99 °F (37.2 °C)   Resp 16   Ht 6' 3\" (1.905 m)   Wt 260 lb (117.9 kg)   SpO2 98%   BMI 32.50 kg/m²       General: alert, oriented x 3, pleasant. HEENT: Normocephalic, atraumatic, pupils equal and reactive to light. Tympanic membranes intact without erythema or edema. Oropharynx clear. Neck: Supple, no masses or thyromegaly or lymphadenopathy. Lungs: clear to auscultation bilaterally without wheezing or rhonchi.   CV: regular rate and rhythm, without murmurs, rubs, or

## 2023-03-31 LAB
ALBUMIN SERPL-MCNC: 4.5 G/DL (ref 3.8–4.9)
ALBUMIN/GLOB SERPL: 2.4 {RATIO} (ref 1.2–2.2)
ALP SERPL-CCNC: 41 IU/L (ref 44–121)
ALT SERPL-CCNC: 38 IU/L (ref 0–44)
AST SERPL-CCNC: 24 IU/L (ref 0–40)
BASOPHILS # BLD AUTO: 0 X10E3/UL (ref 0–0.2)
BASOPHILS NFR BLD AUTO: 1 %
BILIRUB SERPL-MCNC: 0.4 MG/DL (ref 0–1.2)
BUN SERPL-MCNC: 15 MG/DL (ref 6–24)
BUN/CREAT SERPL: 11 (ref 9–20)
CALCIUM SERPL-MCNC: 9.8 MG/DL (ref 8.7–10.2)
CHLORIDE SERPL-SCNC: 101 MMOL/L (ref 96–106)
CHOLEST SERPL-MCNC: 248 MG/DL (ref 100–199)
CO2 SERPL-SCNC: 26 MMOL/L (ref 20–29)
CREAT SERPL-MCNC: 1.31 MG/DL (ref 0.76–1.27)
EGFRCR SERPLBLD CKD-EPI 2021: 63 ML/MIN/1.73
EOSINOPHIL # BLD AUTO: 0.2 X10E3/UL (ref 0–0.4)
EOSINOPHIL NFR BLD AUTO: 4 %
ERYTHROCYTE [DISTWIDTH] IN BLOOD BY AUTOMATED COUNT: 13.1 % (ref 11.6–15.4)
GLOBULIN SER CALC-MCNC: 1.9 G/DL (ref 1.5–4.5)
GLUCOSE SERPL-MCNC: 102 MG/DL (ref 70–99)
HCT VFR BLD AUTO: 46 % (ref 37.5–51)
HDLC SERPL-MCNC: 51 MG/DL
HGB BLD-MCNC: 15.6 G/DL (ref 13–17.7)
IMM GRANULOCYTES # BLD AUTO: 0 X10E3/UL (ref 0–0.1)
IMM GRANULOCYTES NFR BLD AUTO: 0 %
IMP & REVIEW OF LAB RESULTS: NORMAL
LDLC SERPL CALC-MCNC: 178 MG/DL (ref 0–99)
LYMPHOCYTES # BLD AUTO: 1.5 X10E3/UL (ref 0.7–3.1)
LYMPHOCYTES NFR BLD AUTO: 25 %
MCH RBC QN AUTO: 30.2 PG (ref 26.6–33)
MCHC RBC AUTO-ENTMCNC: 33.9 G/DL (ref 31.5–35.7)
MCV RBC AUTO: 89 FL (ref 79–97)
MONOCYTES # BLD AUTO: 0.5 X10E3/UL (ref 0.1–0.9)
MONOCYTES NFR BLD AUTO: 9 %
NEUTROPHILS # BLD AUTO: 3.5 X10E3/UL (ref 1.4–7)
NEUTROPHILS NFR BLD AUTO: 61 %
PLATELET # BLD AUTO: 192 X10E3/UL (ref 150–450)
POTASSIUM SERPL-SCNC: 4.6 MMOL/L (ref 3.5–5.2)
PROT SERPL-MCNC: 6.4 G/DL (ref 6–8.5)
RBC # BLD AUTO: 5.17 X10E6/UL (ref 4.14–5.8)
SODIUM SERPL-SCNC: 140 MMOL/L (ref 134–144)
T4 FREE SERPL-MCNC: 1.13 NG/DL (ref 0.82–1.77)
TRIGL SERPL-MCNC: 105 MG/DL (ref 0–149)
TSH SERPL DL<=0.005 MIU/L-ACNC: 2.51 UIU/ML (ref 0.45–4.5)
VLDLC SERPL CALC-MCNC: 19 MG/DL (ref 5–40)
WBC # BLD AUTO: 5.7 X10E3/UL (ref 3.4–10.8)

## 2023-04-02 LAB
HIV 2 RNA SERPL QL NAA+PROBE: NON REACTIVE
HIV1 RNA SERPL QL NAA+PROBE: NON REACTIVE

## 2023-04-03 ENCOUNTER — PATIENT MESSAGE (OUTPATIENT)
Dept: FAMILY MEDICINE CLINIC | Facility: CLINIC | Age: 58
End: 2023-04-03

## 2023-04-03 DIAGNOSIS — R53.82 CHRONIC FATIGUE: Primary | ICD-10-CM

## 2023-04-03 NOTE — TELEPHONE ENCOUNTER
From: SUSAN HOGUE  To: Abdi Downing  Sent: 4/3/2023 9:08 AM EDT  Subject: Results    Labs normal except cholesterol is very high at 248. You will need to be on a cholesterol medicine if it continues to run high like this. Really work on diet and recheck in 3 months. Please call and schedule a lab appt.

## 2023-04-04 NOTE — TELEPHONE ENCOUNTER
Please print out the testosterone and lipid panel order so that the patient can come pick them up and take them to an outside [facility] facility. Let him know when they are ready.

## 2023-04-20 ENCOUNTER — APPOINTMENT (RX ONLY)
Dept: URBAN - METROPOLITAN AREA CLINIC 330 | Facility: CLINIC | Age: 58
Setting detail: DERMATOLOGY
End: 2023-04-20

## 2023-04-20 DIAGNOSIS — L57.8 OTHER SKIN CHANGES DUE TO CHRONIC EXPOSURE TO NONIONIZING RADIATION: ICD-10-CM

## 2023-04-20 DIAGNOSIS — L20.89 OTHER ATOPIC DERMATITIS: ICD-10-CM | Status: INADEQUATELY CONTROLLED

## 2023-04-20 DIAGNOSIS — Z71.89 OTHER SPECIFIED COUNSELING: ICD-10-CM

## 2023-04-20 DIAGNOSIS — Z80.8 FAMILY HISTORY OF MALIGNANT NEOPLASM OF OTHER ORGANS OR SYSTEMS: ICD-10-CM

## 2023-04-20 DIAGNOSIS — L57.0 ACTINIC KERATOSIS: ICD-10-CM

## 2023-04-20 DIAGNOSIS — D22 MELANOCYTIC NEVI: ICD-10-CM

## 2023-04-20 PROBLEM — L20.84 INTRINSIC (ALLERGIC) ECZEMA: Status: ACTIVE | Noted: 2023-04-20

## 2023-04-20 PROBLEM — D48.5 NEOPLASM OF UNCERTAIN BEHAVIOR OF SKIN: Status: ACTIVE | Noted: 2023-04-20

## 2023-04-20 PROBLEM — D22.71 MELANOCYTIC NEVI OF RIGHT LOWER LIMB, INCLUDING HIP: Status: ACTIVE | Noted: 2023-04-20

## 2023-04-20 PROBLEM — D22.5 MELANOCYTIC NEVI OF TRUNK: Status: ACTIVE | Noted: 2023-04-20

## 2023-04-20 PROCEDURE — ? PRESCRIPTION MEDICATION MANAGEMENT

## 2023-04-20 PROCEDURE — ? EDUCATIONAL RESOURCES PROVIDED

## 2023-04-20 PROCEDURE — 99204 OFFICE O/P NEW MOD 45 MIN: CPT | Mod: 25

## 2023-04-20 PROCEDURE — ? BIOPSY BY SHAVE METHOD

## 2023-04-20 PROCEDURE — ? BODY PHOTOGRAPHY

## 2023-04-20 PROCEDURE — 11301 SHAVE SKIN LESION 0.6-1.0 CM: CPT

## 2023-04-20 PROCEDURE — ? MDM - TREATMENT GOALS

## 2023-04-20 PROCEDURE — 17003 DESTRUCT PREMALG LES 2-14: CPT

## 2023-04-20 PROCEDURE — ? LIQUID NITROGEN

## 2023-04-20 PROCEDURE — ? TREATMENT REGIMEN

## 2023-04-20 PROCEDURE — 11102 TANGNTL BX SKIN SINGLE LES: CPT | Mod: 59

## 2023-04-20 PROCEDURE — ? OTHER

## 2023-04-20 PROCEDURE — ? COUNSELING

## 2023-04-20 PROCEDURE — ? SHAVE REMOVAL

## 2023-04-20 PROCEDURE — 17000 DESTRUCT PREMALG LESION: CPT | Mod: 59

## 2023-04-20 PROCEDURE — ? PRESCRIPTION

## 2023-04-20 PROCEDURE — ? FULL BODY SKIN EXAM

## 2023-04-20 RX ORDER — CLOBETASOL PROPIONATE 0.5 MG/G
CREAM TOPICAL
Qty: 60 | Refills: 2 | Status: ERX | COMMUNITY
Start: 2023-04-20

## 2023-04-20 RX ADMIN — CLOBETASOL PROPIONATE: 0.5 CREAM TOPICAL at 00:00

## 2023-04-20 ASSESSMENT — LOCATION DETAILED DESCRIPTION DERM
LOCATION DETAILED: RIGHT LATERAL PROXIMAL CALF
LOCATION DETAILED: LEFT SUPERIOR PARIETAL SCALP
LOCATION DETAILED: RIGHT SUPERIOR PARIETAL SCALP
LOCATION DETAILED: RIGHT DISTAL DORSAL FOREARM
LOCATION DETAILED: INFERIOR THORACIC SPINE
LOCATION DETAILED: LEFT SUPERIOR OCCIPITAL SCALP
LOCATION DETAILED: LEFT PROXIMAL DORSAL FOREARM
LOCATION DETAILED: LEFT SUPERIOR LATERAL FOREHEAD
LOCATION DETAILED: POSTERIOR MID-PARIETAL SCALP
LOCATION DETAILED: SUPERIOR THORACIC SPINE
LOCATION DETAILED: LEFT MEDIAL UPPER BACK

## 2023-04-20 ASSESSMENT — LOCATION SIMPLE DESCRIPTION DERM
LOCATION SIMPLE: RIGHT FOREARM
LOCATION SIMPLE: LEFT FOREHEAD
LOCATION SIMPLE: LEFT FOREARM
LOCATION SIMPLE: LEFT OCCIPITAL SCALP
LOCATION SIMPLE: UPPER BACK
LOCATION SIMPLE: POSTERIOR SCALP
LOCATION SIMPLE: SCALP
LOCATION SIMPLE: RIGHT LOWER LEG
LOCATION SIMPLE: LEFT UPPER BACK

## 2023-04-20 ASSESSMENT — LOCATION ZONE DERM
LOCATION ZONE: SCALP
LOCATION ZONE: TRUNK
LOCATION ZONE: LEG
LOCATION ZONE: ARM
LOCATION ZONE: FACE

## 2023-04-20 ASSESSMENT — ITCH NUMERIC RATING SCALE: HOW SEVERE IS YOUR ITCHING?: 7

## 2023-04-20 ASSESSMENT — SEVERITY ASSESSMENT 2020: SEVERITY 2020: MODERATE

## 2023-04-20 ASSESSMENT — BSA RASH: BSA RASH: 20

## 2023-04-20 NOTE — PROCEDURE: SHAVE REMOVAL
Medical Necessity Information: It is in your best interest to select a reason for this procedure from the list below. All of these items fulfill various CMS LCD requirements except the new and changing color options.
Medical Necessity Clause: This procedure was medically necessary because the lesion that was treated was:
Lab: 6
Lab Facility: 0
Detail Level: Detailed
Was A Bandage Applied: Yes
Size Of Lesion In Cm (Required): 1
Depth Of Shave: dermis
Biopsy Method: scissors
Anesthesia Type: 1% lidocaine with 1:100,000 epinephrine and a 1:10 solution of 8.4% sodium bicarbonate
Hemostasis: Aluminum Chloride
Wound Care: Petrolatum
Accession #: skin path
Render Path Notes In Note?: No
Consent was obtained from the patient. The risks and benefits to therapy were discussed in detail. Specifically, the risks of infection, scarring, bleeding, prolonged wound healing, incomplete removal, allergy to anesthesia, nerve injury and recurrence were addressed. Prior to the procedure, the treatment site was clearly identified and confirmed by the patient. All components of Universal Protocol/PAUSE Rule completed.
Post-Care Instructions: I reviewed with the patient in detail post-care instructions. Patient is to keep the biopsy site dry overnight, and then apply bacitracin twice daily until healed. Patient may apply hydrogen peroxide soaks to remove any crusting.
Notification Instructions: Patient will be notified of pathology results. However, patient instructed to call the office if not contacted within 2 weeks.
Billing Type: Third-Party Bill

## 2023-04-20 NOTE — PROCEDURE: LIQUID NITROGEN
Duration Of Freeze Thaw-Cycle (Seconds): 2
Post-Care Instructions: I reviewed with the patient in detail post-care instructions. Patient is to wear sunprotection, and avoid picking at any of the treated lesions. Pt may apply Vaseline to crusted or scabbing areas.
Number Of Freeze-Thaw Cycles: 3 freeze-thaw cycles
Render Post-Care Instructions In Note?: no
Consent: The patient's consent was obtained including but not limited to risks of crusting, scabbing, blistering, scarring, darker or lighter pigmentary change, recurrence, incomplete removal and infection.
Show Aperture Variable?: Yes
Detail Level: Detailed

## 2023-04-20 NOTE — PROCEDURE: BODY PHOTOGRAPHY
Whole Body Statement: The whole body was photographed today.
Reason For Photography: The patient is obtaining whole body photography to observe existing suspicious moles and or monitor for the appearance of any new lesions.
Was The Entire Body Photographed (Cannot Bill Unless Entire Body Photographed)?: Please Select Yes or No - If you select Yes you are confirming that you took full body photographs
Detail Level: Detailed
Number Of Photographs (Optional- Will Not Render If 0): 2
Consent was obtained for whole body photography. Patient understands that photograph costs may not be covered by insurance.

## 2023-04-20 NOTE — PROCEDURE: PRESCRIPTION MEDICATION MANAGEMENT
Plan: Will see if rash resolves with clobetasol. If not, will consider Efudex in the fall.
Detail Level: Zone
Render In Strict Bullet Format?: No
Initiate Treatment: Clobetasol 0.05% cream.

## 2023-05-04 LAB
TESTOST FREE SERPL-MCNC: 12.5 PG/ML (ref 7.2–24)
TESTOST SERPL-MCNC: 344 NG/DL (ref 264–916)

## 2023-05-15 ENCOUNTER — PATIENT MESSAGE (OUTPATIENT)
Dept: FAMILY MEDICINE CLINIC | Facility: CLINIC | Age: 58
End: 2023-05-15

## 2023-05-15 RX ORDER — RABEPRAZOLE SODIUM 20 MG/1
20 TABLET, DELAYED RELEASE ORAL DAILY
Qty: 90 TABLET | Refills: 0 | Status: SHIPPED | OUTPATIENT
Start: 2023-05-15

## 2023-05-15 RX ORDER — RABEPRAZOLE SODIUM 20 MG/1
20 TABLET, DELAYED RELEASE ORAL DAILY
COMMUNITY
End: 2023-05-15 | Stop reason: SDUPTHER

## 2023-05-15 NOTE — TELEPHONE ENCOUNTER
From: Mecca Bundy  To: Dr. Maty Gallardo: 5/15/2023 11:50 AM EDT  Subject: Need Raberazole renewed    send to [facility] [address], [address] [facility]. The new stomach meds didnt work as well.

## 2023-05-16 ENCOUNTER — TELEPHONE (OUTPATIENT)
Dept: FAMILY MEDICINE CLINIC | Facility: CLINIC | Age: 58
End: 2023-05-16

## 2023-05-16 NOTE — TELEPHONE ENCOUNTER
Patient stated Dr Rosa Rivera put him on some new stomach medication but its not doing well for him and making him constipated hes asking her to put him back on the previous medication.  Please send in to pharmacy

## 2023-08-25 ENCOUNTER — TELEPHONE (OUTPATIENT)
Age: 58
End: 2023-08-25

## 2023-08-25 ENCOUNTER — OFFICE VISIT (OUTPATIENT)
Age: 58
End: 2023-08-25
Payer: COMMERCIAL

## 2023-08-25 VITALS
WEIGHT: 260 LBS | HEART RATE: 73 BPM | SYSTOLIC BLOOD PRESSURE: 151 MMHG | BODY MASS INDEX: 32.5 KG/M2 | DIASTOLIC BLOOD PRESSURE: 95 MMHG

## 2023-08-25 DIAGNOSIS — I10 ESSENTIAL (PRIMARY) HYPERTENSION: Primary | ICD-10-CM

## 2023-08-25 DIAGNOSIS — I89.0 LYMPHEDEMA, NOT ELSEWHERE CLASSIFIED: ICD-10-CM

## 2023-08-25 DIAGNOSIS — Z86.718 PERSONAL HISTORY OF OTHER VENOUS THROMBOSIS AND EMBOLISM: ICD-10-CM

## 2023-08-25 PROCEDURE — 3077F SYST BP >= 140 MM HG: CPT | Performed by: INTERNAL MEDICINE

## 2023-08-25 PROCEDURE — 99214 OFFICE O/P EST MOD 30 MIN: CPT | Performed by: INTERNAL MEDICINE

## 2023-08-25 PROCEDURE — 3080F DIAST BP >= 90 MM HG: CPT | Performed by: INTERNAL MEDICINE

## 2023-08-25 RX ORDER — ROSUVASTATIN CALCIUM 40 MG/1
40 TABLET, COATED ORAL EVERY EVENING
Qty: 90 TABLET | Refills: 3 | Status: SHIPPED | OUTPATIENT
Start: 2023-08-25

## 2023-08-25 NOTE — TELEPHONE ENCOUNTER
PT JUST SEEN TODAY ,DR Lan Montesinos  GAVE HIM A RX FOR JARDIANCE AND PHARMACY CALLED HIM SAYING THERE WAS NO GENERIC FOR THIS MED AND IT WOULD BE OVER 1000 DOLLARS FOR THIS MED ,SOPT IS WANTING TO SEE IF THERE IS ANOTHER MED THAT HAS A GENERIC THAT CAN BE CALLED INTO [facility]  PLEASE CALL [phone]

## 2023-08-25 NOTE — PROGRESS NOTES
CHUCK Darling  [address] [facility], [address]  PHONE: [phone]    SUBJECTIVE: Tess Delcid (1965) is a 60 y.o. male seen for a follow up visit regarding the following:        ICD-10-CM ICD-9-CM   1. Essential hypertension  I10 401.9   2. History of DVT (deep vein thrombosis)  Z86.718 V12.51   3. Lymphedema  I89.0 457.1         05/13/22      Pt presents for follow up, feeling well overall today. Denies chest pain or palpitations. Pt reports taking spironolactone for 5 days since last visit and experienced bilateral LE edema so the patient d/c the medication. With history of DVT LE US from 1/20/22 - normal LE US and bilateral calculi seen on renal US, no hydronephrosis    ECHO on 1/12/22 shows left ventricle size is normal. Mildly increased wall thickness. Normal wall motion. Normal left ventricular systolic function with a visually estimated EF of 60 - 65%. Normal diastolic function. EKG 1/3/22 shows normal sinus rhythm. BP continues to run quite high. Pt checks BP at home and endorses similar values to clinic (160-170's over 's). Norvasc previously made LE edema worse. Pt has not been back to the lymphedema clinic in some time. Pt is interested in using an at home LE compression device. Since 2 knee replacements his edema is worse. Uses CPAP machine, following up with sleep medicine. Patient had iatrogenic swelling from both Norvasc and Aldactone    05/13/22  Pt doing well. No chest pain. No palpitations. Patient denies syncope. No dyspnea. States they are taking meds. Maintains a normal level of activity for them without symptoms. No dizziness or lightheadedness. All above conditions stable. With today's blood pressure reading it appears as though his hydralazine has positively affected our overall hypertension management.   I have discussed that if he has elevated blood pressures at home an extra dose of hydralazine is reasonable and

## 2023-08-28 RX ORDER — RABEPRAZOLE SODIUM 20 MG/1
TABLET, DELAYED RELEASE ORAL
Qty: 90 TABLET | Refills: 0 | Status: SHIPPED | OUTPATIENT
Start: 2023-08-28

## 2023-08-28 RX ORDER — RABEPRAZOLE SODIUM 20 MG/1
20 TABLET, DELAYED RELEASE ORAL DAILY
Qty: 90 TABLET | Refills: 0 | OUTPATIENT
Start: 2023-08-28

## 2023-08-31 NOTE — TELEPHONE ENCOUNTER
Pt called back. Informed pt that it looks like he needs a PA. Pt stated he looked into some other options and is getting the medication for $10 a month. Then informed pt to call us back then if he has any issues.  Pt verbalized understanding

## 2023-09-28 ENCOUNTER — APPOINTMENT (RX ONLY)
Dept: URBAN - METROPOLITAN AREA CLINIC 330 | Facility: CLINIC | Age: 58
Setting detail: DERMATOLOGY
End: 2023-09-28

## 2023-09-28 DIAGNOSIS — D69.2 OTHER NONTHROMBOCYTOPENIC PURPURA: ICD-10-CM

## 2023-09-28 DIAGNOSIS — L82.0 INFLAMED SEBORRHEIC KERATOSIS: ICD-10-CM

## 2023-09-28 DIAGNOSIS — Z85.820 PERSONAL HISTORY OF MALIGNANT MELANOMA OF SKIN: ICD-10-CM

## 2023-09-28 DIAGNOSIS — Z85.828 PERSONAL HISTORY OF OTHER MALIGNANT NEOPLASM OF SKIN: ICD-10-CM

## 2023-09-28 DIAGNOSIS — D22 MELANOCYTIC NEVI: ICD-10-CM | Status: STABLE

## 2023-09-28 DIAGNOSIS — L57.0 ACTINIC KERATOSIS: ICD-10-CM

## 2023-09-28 DIAGNOSIS — Z80.8 FAMILY HISTORY OF MALIGNANT NEOPLASM OF OTHER ORGANS OR SYSTEMS: ICD-10-CM

## 2023-09-28 DIAGNOSIS — L20.89 OTHER ATOPIC DERMATITIS: ICD-10-CM | Status: IMPROVED

## 2023-09-28 PROBLEM — D48.5 NEOPLASM OF UNCERTAIN BEHAVIOR OF SKIN: Status: ACTIVE | Noted: 2023-09-28

## 2023-09-28 PROBLEM — D22.5 MELANOCYTIC NEVI OF TRUNK: Status: ACTIVE | Noted: 2023-09-28

## 2023-09-28 PROBLEM — D22.72 MELANOCYTIC NEVI OF LEFT LOWER LIMB, INCLUDING HIP: Status: ACTIVE | Noted: 2023-09-28

## 2023-09-28 PROCEDURE — ? COUNSELING

## 2023-09-28 PROCEDURE — ? BIOPSY BY SHAVE METHOD

## 2023-09-28 PROCEDURE — ? OTHER

## 2023-09-28 PROCEDURE — 11102 TANGNTL BX SKIN SINGLE LES: CPT | Mod: 59

## 2023-09-28 PROCEDURE — ? LIQUID NITROGEN

## 2023-09-28 PROCEDURE — ? MDM - TREATMENT GOALS

## 2023-09-28 PROCEDURE — ? MEDICAL PHOTOGRAPHY REVIEW

## 2023-09-28 PROCEDURE — ? FULL BODY SKIN EXAM

## 2023-09-28 PROCEDURE — ? PRESCRIPTION MEDICATION MANAGEMENT

## 2023-09-28 PROCEDURE — 17004 DESTROY PREMAL LESIONS 15/>: CPT

## 2023-09-28 PROCEDURE — 99213 OFFICE O/P EST LOW 20 MIN: CPT | Mod: 25

## 2023-09-28 PROCEDURE — 17110 DESTRUCTION B9 LES UP TO 14: CPT | Mod: 59

## 2023-09-28 PROCEDURE — ? BODY PHOTOGRAPHY

## 2023-09-28 ASSESSMENT — LOCATION ZONE DERM
LOCATION ZONE: EAR
LOCATION ZONE: SCALP
LOCATION ZONE: ARM
LOCATION ZONE: FACE
LOCATION ZONE: HAND
LOCATION ZONE: TRUNK
LOCATION ZONE: LEG

## 2023-09-28 ASSESSMENT — LOCATION SIMPLE DESCRIPTION DERM
LOCATION SIMPLE: RIGHT HAND
LOCATION SIMPLE: SCALP
LOCATION SIMPLE: UPPER BACK
LOCATION SIMPLE: RIGHT LOWER LEG
LOCATION SIMPLE: LEFT HAND
LOCATION SIMPLE: LEFT FOREARM
LOCATION SIMPLE: LEFT POSTERIOR THIGH
LOCATION SIMPLE: CHEST
LOCATION SIMPLE: POSTERIOR SCALP
LOCATION SIMPLE: LEFT FOREHEAD
LOCATION SIMPLE: LEFT EAR
LOCATION SIMPLE: LEFT OCCIPITAL SCALP
LOCATION SIMPLE: RIGHT LOWER BACK
LOCATION SIMPLE: RIGHT EAR
LOCATION SIMPLE: RIGHT FOREARM

## 2023-09-28 ASSESSMENT — LOCATION DETAILED DESCRIPTION DERM
LOCATION DETAILED: LEFT SUPERIOR OCCIPITAL SCALP
LOCATION DETAILED: RIGHT SUPERIOR FRONTAL SCALP
LOCATION DETAILED: RIGHT SUPERIOR POSTERIOR PARIETAL SCALP
LOCATION DETAILED: LEFT ULNAR DORSAL HAND
LOCATION DETAILED: LEFT SUPERIOR FOREHEAD
LOCATION DETAILED: RIGHT MEDIAL INFERIOR CHEST
LOCATION DETAILED: LEFT DISTAL RADIAL DORSAL FOREARM
LOCATION DETAILED: INFERIOR THORACIC SPINE
LOCATION DETAILED: LEFT SUPERIOR HELIX
LOCATION DETAILED: RIGHT SUPERIOR HELIX
LOCATION DETAILED: RIGHT RADIAL DORSAL HAND
LOCATION DETAILED: LEFT PROXIMAL DORSAL FOREARM
LOCATION DETAILED: RIGHT DISTAL DORSAL FOREARM
LOCATION DETAILED: RIGHT INFERIOR LATERAL MIDBACK
LOCATION DETAILED: MID-OCCIPITAL SCALP
LOCATION DETAILED: RIGHT CENTRAL PARIETAL SCALP
LOCATION DETAILED: LEFT PROXIMAL POSTERIOR THIGH
LOCATION DETAILED: RIGHT LATERAL PROXIMAL CALF
LOCATION DETAILED: RIGHT DISTAL RADIAL DORSAL FOREARM
LOCATION DETAILED: LEFT DISTAL DORSAL FOREARM

## 2023-09-28 ASSESSMENT — ITCH NUMERIC RATING SCALE: HOW SEVERE IS YOUR ITCHING?: 0

## 2023-09-28 ASSESSMENT — SEVERITY ASSESSMENT 2020: SEVERITY 2020: CLEAR

## 2023-09-28 NOTE — PROCEDURE: BIOPSY BY SHAVE METHOD
Detail Level: Detailed
Depth Of Biopsy: dermis
Was A Bandage Applied: Yes
Size Of Lesion In Cm: 0
Accession #: Skin path
Biopsy Type: H and E
Biopsy Method: Dermablade
Anesthesia Type: 1% lidocaine with epinephrine
Anesthesia Volume In Cc (Will Not Render If 0): 0.5
Hemostasis: Drysol
Wound Care: Petrolatum
Dressing: bandage
Destruction After The Procedure: No
Type Of Destruction Used: Curettage
Curettage Text: The wound bed was treated with curettage after the biopsy was performed.
Cryotherapy Text: The wound bed was treated with cryotherapy after the biopsy was performed.
Electrodesiccation Text: The wound bed was treated with electrodesiccation after the biopsy was performed.
Electrodesiccation And Curettage Text: The wound bed was treated with electrodesiccation and curettage after the biopsy was performed.
Silver Nitrate Text: The wound bed was treated with silver nitrate after the biopsy was performed.
Lab: 6
Lab Facility: 3
Consent: Written consent was obtained and risks were reviewed including but not limited to scarring, infection, bleeding, scabbing, incomplete removal, nerve damage and allergy to anesthesia.
Post-Care Instructions: I reviewed with the patient in detail post-care instructions. Patient is to keep the biopsy site dry overnight, and then apply bacitracin twice daily until healed. Patient may apply hydrogen peroxide soaks to remove any crusting.
Notification Instructions: Patient will be notified of biopsy results. However, patient instructed to call the office if not contacted within 2 weeks.
Billing Type: Third-Party Bill
Information: Selecting Yes will display possible errors in your note based on the variables you have selected. This validation is only offered as a suggestion for you. PLEASE NOTE THAT THE VALIDATION TEXT WILL BE REMOVED WHEN YOU FINALIZE YOUR NOTE. IF YOU WANT TO FAX A PRELIMINARY NOTE YOU WILL NEED TO TOGGLE THIS TO 'NO' IF YOU DO NOT WANT IT IN YOUR FAXED NOTE.

## 2023-09-28 NOTE — PROCEDURE: BODY PHOTOGRAPHY
Consent was obtained for whole body photography. Patient understands that photograph costs may not be covered by insurance.
Detail Level: Detailed
Reason For Photography: The patient is obtaining whole body photography to observe existing suspicious moles and or monitor for the appearance of any new lesions.
Whole Body Statement: The whole body was photographed today.
Was The Entire Body Photographed (Cannot Bill Unless Entire Body Photographed)?: Please Select Yes or No - If you select Yes you are confirming that you took full body photographs
Number Of Photographs (Optional- Will Not Render If 0): 2

## 2023-09-28 NOTE — PROCEDURE: OTHER
Note Text (......Xxx Chief Complaint.): This diagnosis correlates with the
Render Risk Assessment In Note?: yes
Other (Free Text): Patient consent was obtained to proceed with the visit and recommended plan of care after discussion of all risks and benefits, including the risks of COVID-19 exposure.
Detail Level: Generalized
Other (Free Text): Lesion treated by Dr. Estrada on 5/17/23 via excision.
Detail Level: Simple

## 2023-09-28 NOTE — PROCEDURE: LIQUID NITROGEN
Consent: The patient's consent was obtained including but not limited to risks of crusting, scabbing, blistering, scarring, darker or lighter pigmentary change, recurrence, incomplete removal and infection.
Post-Care Instructions: I reviewed with the patient in detail post-care instructions. Patient is to wear sunprotection, and avoid picking at any of the treated lesions. Pt may apply Vaseline to crusted or scabbing areas.
Detail Level: Detailed
Render Post-Care Instructions In Note?: no
Show Aperture Variable?: Yes
Duration Of Freeze Thaw-Cycle (Seconds): 2
Number Of Freeze-Thaw Cycles: 3 freeze-thaw cycles
Medical Necessity Information: It is in your best interest to select a reason for this procedure from the list below. All of these items fulfill various CMS LCD requirements except the new and changing color options.
Medical Necessity Clause: This procedure was medically necessary because the lesions that were treated were:
Spray Paint Text: The liquid nitrogen was applied to the skin utilizing a spray paint frosting technique.

## 2023-09-28 NOTE — PROCEDURE: PRESCRIPTION MEDICATION MANAGEMENT
Plan: Patient has been using clobetasol daily since 04/2023. Advised patient to discontinue daily use. Discussed topical steroid risk and appropriate use.
Detail Level: Zone
Render In Strict Bullet Format?: No

## 2023-09-28 NOTE — PROCEDURE: COUNSELING
Detail Level: Simple
Detail Level: Detailed
Detail Level: Generalized
Sunscreen Recommendations: SPF 30. Apply every two hours.

## 2023-09-28 NOTE — PROCEDURE: MIPS QUALITY
Quality 402: Tobacco Use And Help With Quitting Among Adolescents: Patient screened for tobacco and never smoked
Quality 130: Documentation Of Current Medications In The Medical Record: Current Medications Documented
Detail Level: Detailed
Quality 431: Preventive Care And Screening: Unhealthy Alcohol Use - Screening: Patient not identified as an unhealthy alcohol user when screened for unhealthy alcohol use using a systematic screening method
Quality 226: Preventive Care And Screening: Tobacco Use: Screening And Cessation Intervention: Patient screened for tobacco use and is an ex/non-smoker
Medication (1) And Associated J-Code Units: Ustekinumab (Stelara), 1mg
Quality 486: Dermatitis - Improvement In Patient-Reported Itch Severity: Itch severity assessment score is reduced by 2 or more points from the initial (index) assessment score to the follow-up visit score

## 2023-10-09 ENCOUNTER — TELEPHONE (OUTPATIENT)
Age: 58
End: 2023-10-09

## 2023-10-09 DIAGNOSIS — F32.1 MODERATE SINGLE CURRENT EPISODE OF MAJOR DEPRESSIVE DISORDER (HCC): ICD-10-CM

## 2023-10-09 DIAGNOSIS — H81.03 MENIERE'S DISEASE OF BOTH EARS: ICD-10-CM

## 2023-10-09 RX ORDER — RABEPRAZOLE SODIUM 20 MG/1
20 TABLET, DELAYED RELEASE ORAL DAILY
Qty: 90 TABLET | Refills: 0 | Status: SHIPPED | OUTPATIENT
Start: 2023-10-09

## 2023-10-09 RX ORDER — SIMVASTATIN 40 MG
40 TABLET ORAL EVERY EVENING
Qty: 30 TABLET | Refills: 5 | Status: SHIPPED | OUTPATIENT
Start: 2023-10-09

## 2023-10-09 RX ORDER — ONDANSETRON 4 MG/1
4 TABLET, ORALLY DISINTEGRATING ORAL 3 TIMES DAILY PRN
Qty: 21 TABLET | Refills: 5 | Status: SHIPPED | OUTPATIENT
Start: 2023-10-09

## 2023-10-09 RX ORDER — BUPROPION HYDROCHLORIDE 150 MG/1
150 TABLET ORAL EVERY MORNING
Qty: 90 TABLET | Refills: 3 | Status: SHIPPED | OUTPATIENT
Start: 2023-10-09

## 2023-10-09 RX ORDER — MECLIZINE HCL 12.5 MG/1
12.5 TABLET ORAL 3 TIMES DAILY PRN
Qty: 30 TABLET | Refills: 11 | Status: SHIPPED | OUTPATIENT
Start: 2023-10-09

## 2023-10-09 RX ORDER — SPIRONOLACTONE 50 MG/1
50 TABLET, FILM COATED ORAL DAILY
Qty: 90 TABLET | Refills: 3 | Status: SHIPPED | OUTPATIENT
Start: 2023-10-09 | End: 2024-10-08

## 2023-10-09 NOTE — TELEPHONE ENCOUNTER
Requested Prescriptions     Pending Prescriptions Disp Refills    spironolactone (ALDACTONE) 50 MG tablet 90 tablet 3     Sig: Take 1 tablet by mouth daily

## 2023-10-09 NOTE — TELEPHONE ENCOUNTER
I called and informed pt. of MD response and he v/u. Med escribed as below  Requested Prescriptions     Signed Prescriptions Disp Refills    simvastatin (ZOCOR) 40 MG tablet 30 tablet 5     Sig: Take 1 tablet by mouth every evening     Authorizing Provider: Aydee Sarkar     Ordering User: LATOSHA Flores

## 2023-10-09 NOTE — TELEPHONE ENCOUNTER
----- Message from Saul Mcdowell MA sent at 10/9/2023 12:08 PM EDT -----  Regarding: FW: Brigitte  Contact: [phone]    ----- Message -----  From: Cha Powers"  Sent: 10/9/2023   8:57 AM EDT  To: [facility] Clinical Staff  Subject: Crestor                                          Yes I have been on Crestor for three weeks or so! Having many side affects and need a change of meds. Headaches, nausea, abdominal pain, and do not sleep. I was reading where Zocor has the least amount of side affects.

## 2023-10-13 RX ORDER — LABETALOL 200 MG/1
200 TABLET, FILM COATED ORAL 2 TIMES DAILY
Qty: 180 TABLET | Refills: 3 | Status: SHIPPED | OUTPATIENT
Start: 2023-10-13

## 2023-10-13 NOTE — TELEPHONE ENCOUNTER
Patient needs refill of Labetalol 200 mg one tablet bid.   to [facility] in [place]

## 2023-10-13 NOTE — TELEPHONE ENCOUNTER
Requested Prescriptions     Pending Prescriptions Disp Refills    labetalol (NORMODYNE) 200 MG tablet 180 tablet 3     Sig: Take 1 tablet by mouth 2 times daily

## 2023-10-19 ENCOUNTER — APPOINTMENT (RX ONLY)
Dept: URBAN - METROPOLITAN AREA CLINIC 330 | Facility: CLINIC | Age: 58
Setting detail: DERMATOLOGY
End: 2023-10-19

## 2023-10-19 DIAGNOSIS — L57.0 ACTINIC KERATOSIS: ICD-10-CM

## 2023-10-19 PROBLEM — C44.719 BASAL CELL CARCINOMA OF SKIN OF LEFT LOWER LIMB, INCLUDING HIP: Status: ACTIVE | Noted: 2023-10-19

## 2023-10-19 PROCEDURE — 17263 DSTRJ MAL LES T/A/L 2.1-3.0: CPT

## 2023-10-19 PROCEDURE — 17000 DESTRUCT PREMALG LESION: CPT | Mod: 59

## 2023-10-19 PROCEDURE — ? LIQUID NITROGEN

## 2023-10-19 PROCEDURE — 17003 DESTRUCT PREMALG LES 2-14: CPT

## 2023-10-19 PROCEDURE — ? COUNSELING

## 2023-10-19 PROCEDURE — ? CURETTAGE AND DESTRUCTION

## 2023-10-19 ASSESSMENT — LOCATION ZONE DERM
LOCATION ZONE: FACE
LOCATION ZONE: SCALP

## 2023-10-19 ASSESSMENT — LOCATION SIMPLE DESCRIPTION DERM
LOCATION SIMPLE: LEFT FOREHEAD
LOCATION SIMPLE: FRONTAL SCALP
LOCATION SIMPLE: RIGHT FOREHEAD

## 2023-10-19 ASSESSMENT — PAIN INTENSITY VAS: HOW INTENSE IS YOUR PAIN 0 BEING NO PAIN, 10 BEING THE MOST SEVERE PAIN POSSIBLE?: 1/10 PAIN

## 2023-10-19 ASSESSMENT — LOCATION DETAILED DESCRIPTION DERM
LOCATION DETAILED: RIGHT SUPERIOR LATERAL FOREHEAD
LOCATION DETAILED: MEDIAL FRONTAL SCALP
LOCATION DETAILED: RIGHT FOREHEAD
LOCATION DETAILED: RIGHT SUPERIOR FOREHEAD
LOCATION DETAILED: LEFT FOREHEAD
LOCATION DETAILED: RIGHT INFERIOR FOREHEAD
LOCATION DETAILED: RIGHT INFERIOR LATERAL FOREHEAD
LOCATION DETAILED: RIGHT LATERAL FOREHEAD

## 2023-10-19 NOTE — PROCEDURE: CURETTAGE AND DESTRUCTION
Detail Level: Detailed
Number Of Curettages: 4
Size Of Lesion In Cm: 2.6
Add Intralesional Injection: No
Concentration (Mg/Ml Or Millions Of Plaque Forming Units/Cc): 0.01
Total Volume (Ccs): 1
Anesthesia Type: 1% lidocaine with epinephrine and a 1:10 solution of 8.4% sodium bicarbonate
Cautery Type: electrodesiccation
What Was Performed First?: Curettage
Final Size Statement: The size of the lesion after curettage was
Additional Information: (Optional): The wound was cleaned, and a pressure dressing was applied.  The patient received detailed post-op instructions.
Consent was obtained from the patient. The risks, benefits and alternatives to therapy were discussed in detail. Specifically, the risks of infection, scarring, bleeding, prolonged wound healing, nerve injury, incomplete removal, allergy to anesthesia and recurrence were addressed. Alternatives to ED&C, such as: surgical removal and XRT were also discussed.  Prior to the procedure, the treatment site was clearly identified and confirmed by the patient. All components of Universal Protocol/PAUSE Rule completed.
Post-Care Instructions: I reviewed with the patient in detail post-care instructions. Patient is to keep the area dry for 48 hours, and not to engage in any swimming until the area is healed. Should the patient develop any fevers, chills, bleeding, severe pain patient will contact the office immediately.
Bill As A Line Item Or As Units: Line Item

## 2023-10-30 ENCOUNTER — OFFICE VISIT (OUTPATIENT)
Age: 58
End: 2023-10-30
Payer: COMMERCIAL

## 2023-10-30 VITALS
SYSTOLIC BLOOD PRESSURE: 174 MMHG | HEIGHT: 75 IN | WEIGHT: 257 LBS | BODY MASS INDEX: 31.95 KG/M2 | HEART RATE: 65 BPM | DIASTOLIC BLOOD PRESSURE: 92 MMHG

## 2023-10-30 DIAGNOSIS — Z86.718 PERSONAL HISTORY OF OTHER VENOUS THROMBOSIS AND EMBOLISM: ICD-10-CM

## 2023-10-30 DIAGNOSIS — I89.0 LYMPHEDEMA, NOT ELSEWHERE CLASSIFIED: ICD-10-CM

## 2023-10-30 DIAGNOSIS — I10 ESSENTIAL (PRIMARY) HYPERTENSION: Primary | ICD-10-CM

## 2023-10-30 PROCEDURE — 99214 OFFICE O/P EST MOD 30 MIN: CPT | Performed by: INTERNAL MEDICINE

## 2023-10-30 PROCEDURE — 3077F SYST BP >= 140 MM HG: CPT | Performed by: INTERNAL MEDICINE

## 2023-10-30 PROCEDURE — 3079F DIAST BP 80-89 MM HG: CPT | Performed by: INTERNAL MEDICINE

## 2023-10-30 RX ORDER — DOXAZOSIN MESYLATE 4 MG/1
4 TABLET ORAL DAILY
Qty: 30 TABLET | Refills: 3 | Status: SHIPPED | OUTPATIENT
Start: 2023-10-30

## 2023-10-30 NOTE — PROGRESS NOTES
appropriate  Past Medical History, Past Surgical History, Family history, Social History, and Medications were all reviewed with the patient today and updated as necessary. 2/24/23  Reports BP at home is better than office reading. Patient currently taking Aldactone 25 mg p.o. daily and hydralazine 100 mg twice a day    The 10-year ASCVD risk score (Alma STERN, et al., 2019) is: 19.9%    Values used to calculate the score:      Age: 59 years      Sex: Male      Is Non- : No      Diabetic: No      Tobacco smoker: No      Systolic Blood Pressure: 380 mmHg      Is BP treated: Yes      HDL Cholesterol: 51 mg/dL      Total Cholesterol: 248 mg/dL  Has nellie and uses machine  Neg renal art duplex  Echocardiogram about a year and a half ago shows some mild LVH left atrial enlargement E/E prime ratio 14 this is essentially consistent with diastolic dysfunction and then with lower extremity edema would be consistent with HFpEF may benefit from Jardiance 10 mg p.o. daily    10/30/23  Pt doing well. No chest pain. No palpitations. Patient denies syncope. No dyspnea. States they are taking meds. Maintains a normal level of activity for them without symptoms. No dizziness or lightheadedness. All above conditions stable.   Bp still high  Refill Jardiance in the hopes that his insurance company will cover it and we can explore either patient assistance or [place] source    Start cardura        Allergies   Allergen Reactions    Codeine Itching     Past Medical History:   Diagnosis Date    Adverse effect of anesthesia     after hernia surgery in 2016 here at [address] \"I had trouble breathing and I blacked out\"    BMI 31.0-31.9,adult     Chronic pain     DDD (degenerative disc disease), cervical 07/20/2016    several surgeries to correct    GERD (gastroesophageal reflux disease)     seldom     History of kidney stones     numerous, lithotripsy x 1    Hypertension     controlled with meds    Moderate single

## 2023-11-08 ENCOUNTER — TELEPHONE (OUTPATIENT)
Age: 58
End: 2023-11-08

## 2023-11-08 NOTE — TELEPHONE ENCOUNTER
Subha Quintanilla calling in to follow up on Jardiance. Phone: [phone]  Fax: [phone]    Can take verbal orders, requires 90 day supply with 3 refills.

## 2023-11-10 ENCOUNTER — TELEPHONE (OUTPATIENT)
Age: 58
End: 2023-11-10

## 2023-11-10 NOTE — TELEPHONE ENCOUNTER
Desiree Degroot with [address] with [facility] called back due to a VM that was left.  Kyra Khalil number is [phone]

## 2024-01-24 ASSESSMENT — PATIENT HEALTH QUESTIONNAIRE - PHQ9
10. IF YOU CHECKED OFF ANY PROBLEMS, HOW DIFFICULT HAVE THESE PROBLEMS MADE IT FOR YOU TO DO YOUR WORK, TAKE CARE OF THINGS AT HOME, OR GET ALONG WITH OTHER PEOPLE: 1
8. MOVING OR SPEAKING SO SLOWLY THAT OTHER PEOPLE COULD HAVE NOTICED. OR THE OPPOSITE - BEING SO FIDGETY OR RESTLESS THAT YOU HAVE BEEN MOVING AROUND A LOT MORE THAN USUAL: NOT AT ALL
5. POOR APPETITE OR OVEREATING: 0
10. IF YOU CHECKED OFF ANY PROBLEMS, HOW DIFFICULT HAVE THESE PROBLEMS MADE IT FOR YOU TO DO YOUR WORK, TAKE CARE OF THINGS AT HOME, OR GET ALONG WITH OTHER PEOPLE: SOMEWHAT DIFFICULT
6. FEELING BAD ABOUT YOURSELF - OR THAT YOU ARE A FAILURE OR HAVE LET YOURSELF OR YOUR FAMILY DOWN: 0
4. FEELING TIRED OR HAVING LITTLE ENERGY: 2
4. FEELING TIRED OR HAVING LITTLE ENERGY: MORE THAN HALF THE DAYS
7. TROUBLE CONCENTRATING ON THINGS, SUCH AS READING THE NEWSPAPER OR WATCHING TELEVISION: 2
2. FEELING DOWN, DEPRESSED OR HOPELESS: NOT AT ALL
6. FEELING BAD ABOUT YOURSELF - OR THAT YOU ARE A FAILURE OR HAVE LET YOURSELF OR YOUR FAMILY DOWN: NOT AT ALL
SUM OF ALL RESPONSES TO PHQ QUESTIONS 1-9: 7
SUM OF ALL RESPONSES TO PHQ QUESTIONS 1-9: 7
1. LITTLE INTEREST OR PLEASURE IN DOING THINGS: NOT AT ALL
8. MOVING OR SPEAKING SO SLOWLY THAT OTHER PEOPLE COULD HAVE NOTICED. OR THE OPPOSITE, BEING SO FIGETY OR RESTLESS THAT YOU HAVE BEEN MOVING AROUND A LOT MORE THAN USUAL: 0
SUM OF ALL RESPONSES TO PHQ QUESTIONS 1-9: 7
9. THOUGHTS THAT YOU WOULD BE BETTER OFF DEAD, OR OF HURTING YOURSELF: NOT AT ALL
SUM OF ALL RESPONSES TO PHQ9 QUESTIONS 1 & 2: 0
1. LITTLE INTEREST OR PLEASURE IN DOING THINGS: 0
SUM OF ALL RESPONSES TO PHQ QUESTIONS 1-9: 7
3. TROUBLE FALLING OR STAYING ASLEEP: 3
9. THOUGHTS THAT YOU WOULD BE BETTER OFF DEAD, OR OF HURTING YOURSELF: 0
5. POOR APPETITE OR OVEREATING: NOT AT ALL
3. TROUBLE FALLING OR STAYING ASLEEP: NEARLY EVERY DAY
SUM OF ALL RESPONSES TO PHQ QUESTIONS 1-9: 7
7. TROUBLE CONCENTRATING ON THINGS, SUCH AS READING THE NEWSPAPER OR WATCHING TELEVISION: MORE THAN HALF THE DAYS
2. FEELING DOWN, DEPRESSED OR HOPELESS: 0

## 2024-01-25 NOTE — PROGRESS NOTES
[facility]  [address]  [place] [address]  [phone]    Patient Name:  Matheus Tyler  YOB: 1965      Office Visit 1/26/2024    CHIEF COMPLAINT:    Chief Complaint   Patient presents with    CPAP/BiPAP    Sleep Apnea    Gas     Wakes up around 2 am . Bloated and gassy    Follow-up         HISTORY OF PRESENT ILLNESS:  Patient is an 58 y.o. male seen today for follow up of MELISSA. Pt had a PSG/HST on 8/2/16 with an AHI of 8.5/hr with desaturations to 87%. Pt is on CPAP 10-15 cm H2O.   Pt reports that he had been doing well but about recently, he started having issues with waking up feeling gassy and bloated. He reports that sometime around 2am pretty much daily and then he tosses and turns for about 2 hours.   He reports that otherwise he is doing well with PAP therapy. He has good energy during the day and he is no longer groggy. He is using a full face mask. His mask is fitting well.   He is having some sinus issues and is followed by ENT. Pt is awaiting a procedure to help with nasal inflammation and narrowing. Pt's BP is elevated today. He is out of one of his BP meds but is going to pick it up today.   Pt has excellent compliance with use 90/90 days with an average use of 7 hrs and 49  mins per day. Pt has a mask leak of 11.3L/min with an AHI of 1/hr Pt is benefiting and tolerating PAP therapy.           1/26/2024     7:58 AM 6/15/2022     7:50 AM 3/7/2022     8:32 AM   Sleep Medicine   Sitting and reading 0 0 1   Watching TV 0 0 1   Sitting, inactive in a public place (e.g. a theatre or a meeting) 0 0 1   As a passenger in a car for an hour without a break 2 0 2   Lying down to rest in the afternoon when circumstances permit 1 0 1   Sitting and talking to someone 0 0 1   Sitting quietly after a lunch without alcohol 0 0 1   In a car, while stopped for a few minutes in traffic 0 0 1   [place] Sleepiness Score 3 0 9         Past Medical History:   Diagnosis Date

## 2024-01-26 ENCOUNTER — OFFICE VISIT (OUTPATIENT)
Dept: SLEEP MEDICINE | Age: 59
End: 2024-01-26
Payer: COMMERCIAL

## 2024-01-26 VITALS
TEMPERATURE: 97.2 F | RESPIRATION RATE: 16 BRPM | DIASTOLIC BLOOD PRESSURE: 84 MMHG | HEIGHT: 75 IN | SYSTOLIC BLOOD PRESSURE: 162 MMHG | HEART RATE: 83 BPM | WEIGHT: 259.6 LBS | OXYGEN SATURATION: 96 % | BODY MASS INDEX: 32.28 KG/M2

## 2024-01-26 DIAGNOSIS — G47.34 NOCTURNAL HYPOXEMIA: ICD-10-CM

## 2024-01-26 DIAGNOSIS — G47.33 OSA (OBSTRUCTIVE SLEEP APNEA): Primary | ICD-10-CM

## 2024-01-26 PROCEDURE — 99213 OFFICE O/P EST LOW 20 MIN: CPT | Performed by: PHYSICIAN ASSISTANT

## 2024-01-26 PROCEDURE — 3077F SYST BP >= 140 MM HG: CPT | Performed by: PHYSICIAN ASSISTANT

## 2024-01-26 PROCEDURE — 3079F DIAST BP 80-89 MM HG: CPT | Performed by: PHYSICIAN ASSISTANT

## 2024-01-26 ASSESSMENT — SLEEP AND FATIGUE QUESTIONNAIRES
HOW LIKELY ARE YOU TO NOD OFF OR FALL ASLEEP IN A CAR, WHILE STOPPED FOR A FEW MINUTES IN TRAFFIC: 0
HOW LIKELY ARE YOU TO NOD OFF OR FALL ASLEEP WHEN YOU ARE A PASSENGER IN A CAR FOR AN HOUR WITHOUT A BREAK: 2
HOW LIKELY ARE YOU TO NOD OFF OR FALL ASLEEP WHILE SITTING INACTIVE IN A PUBLIC PLACE: 0
HOW LIKELY ARE YOU TO NOD OFF OR FALL ASLEEP WHILE SITTING AND TALKING TO SOMEONE: 0
HOW LIKELY ARE YOU TO NOD OFF OR FALL ASLEEP WHILE SITTING QUIETLY AFTER LUNCH WITHOUT ALCOHOL: 0
ESS TOTAL SCORE: 3
HOW LIKELY ARE YOU TO NOD OFF OR FALL ASLEEP WHILE SITTING AND READING: 0
HOW LIKELY ARE YOU TO NOD OFF OR FALL ASLEEP WHILE WATCHING TV: 0
HOW LIKELY ARE YOU TO NOD OFF OR FALL ASLEEP WHILE LYING DOWN TO REST IN THE AFTERNOON WHEN CIRCUMSTANCES PERMIT: 1

## 2024-01-29 ENCOUNTER — OFFICE VISIT (OUTPATIENT)
Dept: FAMILY MEDICINE CLINIC | Facility: CLINIC | Age: 59
End: 2024-01-29
Payer: COMMERCIAL

## 2024-01-29 VITALS
SYSTOLIC BLOOD PRESSURE: 142 MMHG | DIASTOLIC BLOOD PRESSURE: 89 MMHG | BODY MASS INDEX: 32.2 KG/M2 | WEIGHT: 259 LBS | HEART RATE: 85 BPM | OXYGEN SATURATION: 95 % | TEMPERATURE: 97.6 F | HEIGHT: 75 IN | RESPIRATION RATE: 16 BRPM

## 2024-01-29 DIAGNOSIS — L20.84 INTRINSIC ECZEMA: ICD-10-CM

## 2024-01-29 DIAGNOSIS — K21.9 GASTROESOPHAGEAL REFLUX DISEASE WITHOUT ESOPHAGITIS: ICD-10-CM

## 2024-01-29 DIAGNOSIS — I10 ESSENTIAL HYPERTENSION: ICD-10-CM

## 2024-01-29 DIAGNOSIS — M79.672 LEFT FOOT PAIN: Primary | ICD-10-CM

## 2024-01-29 PROCEDURE — 3079F DIAST BP 80-89 MM HG: CPT | Performed by: FAMILY MEDICINE

## 2024-01-29 PROCEDURE — 99214 OFFICE O/P EST MOD 30 MIN: CPT | Performed by: FAMILY MEDICINE

## 2024-01-29 PROCEDURE — 3077F SYST BP >= 140 MM HG: CPT | Performed by: FAMILY MEDICINE

## 2024-01-29 RX ORDER — RABEPRAZOLE SODIUM 20 MG/1
20 TABLET, DELAYED RELEASE ORAL DAILY
Qty: 90 TABLET | Refills: 3 | Status: SHIPPED | OUTPATIENT
Start: 2024-01-29

## 2024-01-29 RX ORDER — CLOBETASOL PROPIONATE 0.5 MG/G
CREAM TOPICAL
Qty: 60 G | Refills: 1 | Status: SHIPPED | OUTPATIENT
Start: 2024-01-29

## 2024-01-29 RX ORDER — MELOXICAM 7.5 MG/1
7.5 TABLET ORAL DAILY
Qty: 30 TABLET | Refills: 3 | Status: SHIPPED | OUTPATIENT
Start: 2024-01-29

## 2024-01-29 NOTE — PROGRESS NOTES
[facility] [place] [address]  Phone: [phone] Fax [phone]  Tash Villa MD  1/29/2024           Mr. Tyler  is a 58 y.o.  year old  male patient who comes in complaining of a 2 month history of left heel pain.  It hurts a little bit when he gets up and walks but it seems to get worse with time.  He thought it might be plantar fasciitis but the stretches and the heel pads have not helped him.  He has taken Advil without a lot of relief.    He does need his Aciphex refilled for reflux.    There is also steroid cream he needs refilled that he gets usually from his dermatologist for eczema.  Mr. Tyler  has  has a past medical history of Adverse effect of anesthesia, BMI 31.0-31.9,adult, Chronic pain, DDD (degenerative disc disease), cervical, GERD (gastroesophageal reflux disease), History of kidney stones, Hypertension, Kidney stone, Moderate single current episode of major depressive disorder (HCC), Nausea & vomiting, OA (osteoarthritis), PUD (peptic ulcer disease), Sleep apnea, Thromboembolus (HCC), Thromboembolus (HCC), and Vertigo.    Mr. Tyler  has  has a past surgical history that includes Total knee arthroplasty (2018); hernia repair; Septoplasty (2014); hernia repair (Bilateral, 2016); Tonsillectomy (1993); Cervical discectomy (2015); lumbar laminectomy (1995); cervical fusion (1998); Cholecystectomy, laparoscopic (2011); Appendectomy (as teen ); lysis of adhesions (2012); Knee arthroscopy (Left); Lithotripsy; orthopedic surgery (Left, 12/2017); and Total knee arthroplasty (Left, 10/09/2017).    Mr. Tyler   Current Outpatient Medications   Medication Sig Dispense Refill    RABEprazole (ACIPHEX) 20 MG tablet Take 1 tablet by mouth daily 90 tablet 3    clobetasol (TEMOVATE) 0.05 % cream Apply topically 2 times daily. 60 g 1    meloxicam (MOBIC) 7.5 MG tablet Take 1 tablet by mouth daily 30 tablet 3    empagliflozin (JARDIANCE) 10 MG tablet Take 1

## 2024-01-31 ENCOUNTER — OFFICE VISIT (OUTPATIENT)
Age: 59
End: 2024-01-31
Payer: COMMERCIAL

## 2024-01-31 VITALS
HEIGHT: 75 IN | DIASTOLIC BLOOD PRESSURE: 90 MMHG | BODY MASS INDEX: 32.08 KG/M2 | SYSTOLIC BLOOD PRESSURE: 154 MMHG | WEIGHT: 258 LBS | HEART RATE: 75 BPM

## 2024-01-31 DIAGNOSIS — I10 ESSENTIAL (PRIMARY) HYPERTENSION: Primary | ICD-10-CM

## 2024-01-31 DIAGNOSIS — I89.0 LYMPHEDEMA, NOT ELSEWHERE CLASSIFIED: ICD-10-CM

## 2024-01-31 DIAGNOSIS — R06.09 DYSPNEA ON EXERTION: ICD-10-CM

## 2024-01-31 DIAGNOSIS — Z86.718 PERSONAL HISTORY OF OTHER VENOUS THROMBOSIS AND EMBOLISM: ICD-10-CM

## 2024-01-31 PROCEDURE — 93000 ELECTROCARDIOGRAM COMPLETE: CPT | Performed by: INTERNAL MEDICINE

## 2024-01-31 PROCEDURE — 3077F SYST BP >= 140 MM HG: CPT | Performed by: INTERNAL MEDICINE

## 2024-01-31 PROCEDURE — 99214 OFFICE O/P EST MOD 30 MIN: CPT | Performed by: INTERNAL MEDICINE

## 2024-01-31 PROCEDURE — 3080F DIAST BP >= 90 MM HG: CPT | Performed by: INTERNAL MEDICINE

## 2024-01-31 RX ORDER — BUMETANIDE 2 MG/1
2 TABLET ORAL DAILY
Qty: 90 TABLET | Refills: 3 | Status: SHIPPED | OUTPATIENT
Start: 2024-01-31

## 2024-01-31 RX ORDER — LABETALOL 200 MG/1
200 TABLET, FILM COATED ORAL 2 TIMES DAILY
Qty: 180 TABLET | Refills: 3 | Status: SHIPPED | OUTPATIENT
Start: 2024-01-31

## 2024-01-31 RX ORDER — DOXAZOSIN MESYLATE 4 MG/1
4 TABLET ORAL DAILY
Qty: 90 TABLET | Refills: 3 | Status: SHIPPED | OUTPATIENT
Start: 2024-01-31

## 2024-01-31 RX ORDER — SIMVASTATIN 40 MG
40 TABLET ORAL EVERY EVENING
Qty: 90 TABLET | Refills: 3 | Status: SHIPPED | OUTPATIENT
Start: 2024-01-31

## 2024-01-31 RX ORDER — SPIRONOLACTONE 50 MG/1
50 TABLET, FILM COATED ORAL 2 TIMES DAILY
Qty: 180 TABLET | Refills: 3 | Status: SHIPPED | OUTPATIENT
Start: 2024-01-31 | End: 2025-01-30

## 2024-01-31 NOTE — PROGRESS NOTES
[facility] CARDIOLOGY, [address], [address]  [place] [address]  PHONE: [phone]    SUBJECTIVE: /HPI  Matheus Tyler (1965) is a 58 y.o. male seen for a follow up visit regarding the following:   Specialty Problems          Cardiology Problems    Essential hypertension         Pt states that since he has been taking the Jardiance, his edema has decreased and he doesn't feel as lethargic. He is still concerned with the swelling in his LE which has not completely resolved. Patient has been hypertensive at home in the 140s and 150s. He denies any chest pain or shortness of breath. No dizziness or palpitations. PT denies any syncope.     ECHO: 1/22    Left Ventricle: Left ventricle size is normal. Mildly increased wall thickness. Normal wall motion. Normal left ventricular systolic function with a visually estimated EF of 60 - 65%. Normal diastolic function.    Past Medical History, Past Surgical History, Family history, Social History, and Medications were all reviewed with the patient today and updated as necessary.    Allergies   Allergen Reactions    Codeine Itching     Past Medical History:   Diagnosis Date    Adverse effect of anesthesia     after hernia surgery in 2016 here at [facility] \"I had trouble breathing and I blacked out\"    BMI 31.0-31.9,adult     Chronic pain     DDD (degenerative disc disease), cervical 07/20/2016    several surgeries to correct    GERD (gastroesophageal reflux disease)     seldom     History of kidney stones     numerous, lithotripsy x 1    Hypertension     controlled with meds    Kidney stone     Moderate single current episode of major depressive disorder (HCC) 3/16/2018    Nausea & vomiting     sometimes after anesthesia    OA (osteoarthritis)     PUD (peptic ulcer disease)     no recent episodes    Sleep apnea     c-pap    Thromboembolus (HCC) 2010    left knee s/p knee arth    Thromboembolus (HCC) 2007    s/p lithotripsy-     Vertigo      Past Surgical

## 2024-02-06 LAB
25(OH)D3+25(OH)D2 SERPL-MCNC: 22 NG/ML (ref 30–100)
ALBUMIN SERPL-MCNC: 4.2 G/DL (ref 3.8–4.9)
ALBUMIN/GLOB SERPL: 2.2 {RATIO} (ref 1.2–2.2)
ALP SERPL-CCNC: 43 IU/L (ref 44–121)
ALT SERPL-CCNC: 27 IU/L (ref 0–44)
AST SERPL-CCNC: 26 IU/L (ref 0–40)
BASOPHILS # BLD AUTO: 0 X10E3/UL (ref 0–0.2)
BASOPHILS NFR BLD AUTO: 1 %
BILIRUB SERPL-MCNC: 0.4 MG/DL (ref 0–1.2)
BUN SERPL-MCNC: 20 MG/DL (ref 8–27)
BUN/CREAT SERPL: 15 (ref 10–24)
CALCIUM SERPL-MCNC: 9.6 MG/DL (ref 8.6–10.2)
CHLORIDE SERPL-SCNC: 102 MMOL/L (ref 96–106)
CHOLEST SERPL-MCNC: 170 MG/DL (ref 100–199)
CO2 SERPL-SCNC: 23 MMOL/L (ref 20–29)
CREAT SERPL-MCNC: 1.3 MG/DL (ref 0.76–1.27)
EGFRCR SERPLBLD CKD-EPI 2021: 63 ML/MIN/1.73
EOSINOPHIL # BLD AUTO: 0.4 X10E3/UL (ref 0–0.4)
EOSINOPHIL NFR BLD AUTO: 7 %
ERYTHROCYTE [DISTWIDTH] IN BLOOD BY AUTOMATED COUNT: 12.9 % (ref 11.6–15.4)
GLOBULIN SER CALC-MCNC: 1.9 G/DL (ref 1.5–4.5)
GLUCOSE SERPL-MCNC: 124 MG/DL (ref 70–99)
HBA1C MFR BLD: 6.6 % (ref 4.8–5.6)
HCT VFR BLD AUTO: 45.4 % (ref 37.5–51)
HDLC SERPL-MCNC: 45 MG/DL
HGB BLD-MCNC: 15.6 G/DL (ref 13–17.7)
IMM GRANULOCYTES # BLD AUTO: 0 X10E3/UL (ref 0–0.1)
IMM GRANULOCYTES NFR BLD AUTO: 0 %
LDLC SERPL CALC-MCNC: 92 MG/DL (ref 0–99)
LYMPHOCYTES # BLD AUTO: 1.4 X10E3/UL (ref 0.7–3.1)
LYMPHOCYTES NFR BLD AUTO: 26 %
MCH RBC QN AUTO: 30.7 PG (ref 26.6–33)
MCHC RBC AUTO-ENTMCNC: 34.4 G/DL (ref 31.5–35.7)
MCV RBC AUTO: 89 FL (ref 79–97)
MONOCYTES # BLD AUTO: 0.5 X10E3/UL (ref 0.1–0.9)
MONOCYTES NFR BLD AUTO: 10 %
NEUTROPHILS # BLD AUTO: 2.9 X10E3/UL (ref 1.4–7)
NEUTROPHILS NFR BLD AUTO: 56 %
PLATELET # BLD AUTO: 202 X10E3/UL (ref 150–450)
POTASSIUM SERPL-SCNC: 4.6 MMOL/L (ref 3.5–5.2)
PROT SERPL-MCNC: 6.1 G/DL (ref 6–8.5)
RBC # BLD AUTO: 5.08 X10E6/UL (ref 4.14–5.8)
SODIUM SERPL-SCNC: 138 MMOL/L (ref 134–144)
TRIGL SERPL-MCNC: 194 MG/DL (ref 0–149)
VLDLC SERPL CALC-MCNC: 33 MG/DL (ref 5–40)
WBC # BLD AUTO: 5.2 X10E3/UL (ref 3.4–10.8)

## 2024-02-07 LAB
IRON SATN MFR SERPL: 24 % (ref 15–55)
IRON SERPL-MCNC: 93 UG/DL (ref 38–169)
LPA SERPL-SCNC: <8.4 NMOL/L
TIBC SERPL-MCNC: 394 UG/DL (ref 250–450)
UIBC SERPL-MCNC: 301 UG/DL (ref 111–343)

## 2024-02-15 ENCOUNTER — HOSPITAL ENCOUNTER (OUTPATIENT)
Dept: CT IMAGING | Age: 59
Discharge: HOME | End: 2024-02-15
Attending: INTERNAL MEDICINE

## 2024-02-15 DIAGNOSIS — R06.09 DYSPNEA ON EXERTION: ICD-10-CM

## 2024-02-15 DIAGNOSIS — I10 ESSENTIAL (PRIMARY) HYPERTENSION: ICD-10-CM

## 2024-02-15 DIAGNOSIS — I89.0 LYMPHEDEMA, NOT ELSEWHERE CLASSIFIED: ICD-10-CM

## 2024-02-15 PROCEDURE — 75571 CT HRT W/O DYE W/CA TEST: CPT

## 2024-02-22 RX ORDER — DOXAZOSIN MESYLATE 4 MG/1
4 TABLET ORAL DAILY
Qty: 90 TABLET | Refills: 3 | Status: SHIPPED | OUTPATIENT
Start: 2024-02-22

## 2024-02-27 ENCOUNTER — PATIENT MESSAGE (OUTPATIENT)
Dept: FAMILY MEDICINE CLINIC | Facility: CLINIC | Age: 59
End: 2024-02-27

## 2024-02-27 DIAGNOSIS — J32.9 RECURRENT SINUSITIS: Primary | ICD-10-CM

## 2024-02-28 ENCOUNTER — PATIENT MESSAGE (OUTPATIENT)
Age: 59
End: 2024-02-28

## 2024-02-28 ENCOUNTER — TELEPHONE (OUTPATIENT)
Age: 59
End: 2024-02-28

## 2024-02-28 NOTE — TELEPHONE ENCOUNTER
Pt.still c/o ongoing dizziness.He has had this since November nothing new.He stays fatigued.Had ECHO and NUKE.Was asking for sooner appt.w/.I told him I have nothing sooner.He does want to know if  could review ECHO/Nuke and see if any concerns?

## 2024-02-28 NOTE — TELEPHONE ENCOUNTER
----- Message from Trinidad Flynn MA sent at 2/28/2024  4:17 PM EST -----  Regarding: FW: Jardiance 10mg  Contact: [phone]    ----- Message -----  From: Amber Rendon MA  Sent: 2/28/2024   3:52 PM EST  To: Trinidad Flynn MA  Subject: FW: Jardiance 10mg                                 ----- Message -----  From: Matheus Tyler \"Jonathan\"  Sent: 2/28/2024   3:48 PM EST  To: \[facility]\"" Cardiology Clinical Staff  Subject: Jardiance 10mg                                   Also, I seem to be having side affects from meds! -Ongoing dizziness, off and on feel nauseated, so tired and fatigued and stay sleepy, have to force myself to move. Could it be meds and some of the findings on my Echo? I’m really confused and don’t know what to do! All has been going on since November. And i have had a Sinus infection since November.  Can’t get rid of it with meds, nasal sprays etc.

## 2024-02-28 NOTE — TELEPHONE ENCOUNTER
From: Matheus Tyler  To: Dr. Tash Villa  Sent: 2/27/2024 1:46 PM EST  Subject: Nine Sinus Infections 2023    Yes, i’d like a referral to Dr. Javier Copeland at [facility]. [phone].  Dr. Helena Alicea has previously provided care at [facility]. for all nine infections. Need a second opinion on Surgery she has suggested   Need a referral from [facility]. Can i do video vs in office for the referral?

## 2024-02-29 ENCOUNTER — CLINICAL DOCUMENTATION (OUTPATIENT)
Dept: CARDIOLOGY | Age: 59
End: 2024-02-29

## 2024-02-29 ENCOUNTER — TELEPHONE (OUTPATIENT)
Dept: SLEEP MEDICINE | Age: 59
End: 2024-02-29

## 2024-02-29 NOTE — TELEPHONE ENCOUNTER
Please advise----- Message from Matheus Tyler sent at 2/29/2024  9:50 AM EST -----  Regarding: Sleep Apnea worsened  Contact: [phone]  Yes, my sleep habits for the past 6-8 months have worsened. My heart Doctor Jose J Early thinks that my lack of sleep is driving my BP up! I’m on BP meds but with them it’s 150’s-160’s/90’s. I feel like death every day. I’m at a loss on what to do for good sleep! I’ve had a sinus infection nine times in a year! Any correlation of these?

## 2024-02-29 NOTE — TELEPHONE ENCOUNTER
Do not see anything on the echo or nuclear or explain dizziness or fatigue there are no concerns on either of these tests symptoms sound like he may have worsening sleep apnea

## 2024-03-04 ENCOUNTER — TELEPHONE (OUTPATIENT)
Dept: SLEEP MEDICINE | Age: 59
End: 2024-03-04

## 2024-03-04 DIAGNOSIS — G47.34 NOCTURNAL HYPOXEMIA: ICD-10-CM

## 2024-03-04 DIAGNOSIS — G47.33 OSA (OBSTRUCTIVE SLEEP APNEA): Primary | ICD-10-CM

## 2024-03-04 NOTE — TELEPHONE ENCOUNTER
----- Message from Matheus Tyler sent at 3/4/2024  9:52 AM EST -----  Regarding: sleep problem  Contact: [phone]  I can make any time work. Here’s my number [phone].   Just give me a time and I will be available.

## 2024-03-04 NOTE — TELEPHONE ENCOUNTER
I called pt and we discussed his sleeping issues. He reports that he is able to fall asleep easily but he wakes up around 2:30-3:30 am and is unable to go back to sleep. Occasionally, he will be able to fall back to sleep right before his alarm goes off. He is now feeling terrible. He is tired all of the time.   His cardiologist is concerned that his BP has been elevated. Pt has has had an echo and a stress test which looked ok. He had a calcium scoring test with score of 146 but he was told that that is not likely the issue.   Pt has seen ENT and is awaiting turbinate reduction as he has had 9 sinus infections in the last year. Pt is just exhausted and frustrated.   We discussed adjusting his CPAP pressure as well as a trial of extended release melatonin. Pt is agreeable. I will order a new machine as his machine is old.     CHUCK Dias

## 2024-03-08 RX ORDER — BUMETANIDE 2 MG/1
2 TABLET ORAL DAILY
Qty: 90 TABLET | Refills: 3 | OUTPATIENT
Start: 2024-03-08

## 2024-03-13 ENCOUNTER — OFFICE VISIT (OUTPATIENT)
Dept: ENT CLINIC | Age: 59
End: 2024-03-13
Payer: COMMERCIAL

## 2024-03-13 VITALS
SYSTOLIC BLOOD PRESSURE: 152 MMHG | WEIGHT: 249 LBS | BODY MASS INDEX: 31.95 KG/M2 | HEIGHT: 74 IN | DIASTOLIC BLOOD PRESSURE: 82 MMHG

## 2024-03-13 DIAGNOSIS — J31.0 RHINITIS MEDICAMENTOSA: Primary | ICD-10-CM

## 2024-03-13 DIAGNOSIS — J34.3 NASAL TURBINATE HYPERTROPHY: ICD-10-CM

## 2024-03-13 DIAGNOSIS — T48.5X5A RHINITIS MEDICAMENTOSA: Primary | ICD-10-CM

## 2024-03-13 PROCEDURE — 3077F SYST BP >= 140 MM HG: CPT | Performed by: OTOLARYNGOLOGY

## 2024-03-13 PROCEDURE — 31231 NASAL ENDOSCOPY DX: CPT | Performed by: OTOLARYNGOLOGY

## 2024-03-13 PROCEDURE — 99244 OFF/OP CNSLTJ NEW/EST MOD 40: CPT | Performed by: OTOLARYNGOLOGY

## 2024-03-13 PROCEDURE — 3079F DIAST BP 80-89 MM HG: CPT | Performed by: OTOLARYNGOLOGY

## 2024-03-13 RX ORDER — FLUOCINOLONE ACETONIDE 0.11 MG/ML
OIL AURICULAR (OTIC)
COMMUNITY
Start: 2024-01-02

## 2024-03-13 NOTE — PROGRESS NOTES
Nasal turbinate hypertrophy  J34.3            Discussed diagnosis of rhinitis medicamentosa and weaning protocol off of four-way spray.  Samples were provided of Ryaltris nasal spray and he was instructed on twice daily use.    Serial exam in 1 month.    The patient diagnoses and management plan were discussed at length. They  demonstrated an understanding of the plan and stated that all questions were answered to their satisfaction.   Please CC referring provider, thank you.       PATIENT EDUCATION / INSTRUCTIONS GIVEN FOR:  Nasal Hygiene   Afrin weaning protocol

## 2024-03-19 ENCOUNTER — APPOINTMENT (RX ONLY)
Dept: URBAN - METROPOLITAN AREA CLINIC 330 | Facility: CLINIC | Age: 59
Setting detail: DERMATOLOGY
End: 2024-03-19

## 2024-03-19 DIAGNOSIS — L57.0 ACTINIC KERATOSIS: ICD-10-CM

## 2024-03-19 DIAGNOSIS — D22 MELANOCYTIC NEVI: ICD-10-CM | Status: STABLE

## 2024-03-19 DIAGNOSIS — Z85.820 PERSONAL HISTORY OF MALIGNANT MELANOMA OF SKIN: ICD-10-CM

## 2024-03-19 DIAGNOSIS — Z80.8 FAMILY HISTORY OF MALIGNANT NEOPLASM OF OTHER ORGANS OR SYSTEMS: ICD-10-CM

## 2024-03-19 DIAGNOSIS — L57.8 OTHER SKIN CHANGES DUE TO CHRONIC EXPOSURE TO NONIONIZING RADIATION: ICD-10-CM

## 2024-03-19 DIAGNOSIS — Z85.828 PERSONAL HISTORY OF OTHER MALIGNANT NEOPLASM OF SKIN: ICD-10-CM

## 2024-03-19 PROBLEM — D22.5 MELANOCYTIC NEVI OF TRUNK: Status: ACTIVE | Noted: 2024-03-19

## 2024-03-19 PROCEDURE — ? OBSERVATION

## 2024-03-19 PROCEDURE — ? OTHER

## 2024-03-19 PROCEDURE — ? FULL BODY SKIN EXAM

## 2024-03-19 PROCEDURE — 17000 DESTRUCT PREMALG LESION: CPT

## 2024-03-19 PROCEDURE — ? SUNSCREEN RECOMMENDATIONS

## 2024-03-19 PROCEDURE — ? LIQUID NITROGEN

## 2024-03-19 PROCEDURE — ? MEDICAL PHOTOGRAPHY REVIEW

## 2024-03-19 PROCEDURE — 99213 OFFICE O/P EST LOW 20 MIN: CPT | Mod: 25

## 2024-03-19 PROCEDURE — 17003 DESTRUCT PREMALG LES 2-14: CPT

## 2024-03-19 PROCEDURE — ? COUNSELING

## 2024-03-19 ASSESSMENT — LOCATION DETAILED DESCRIPTION DERM
LOCATION DETAILED: LEFT DISTAL RADIAL DORSAL FOREARM
LOCATION DETAILED: RIGHT PROXIMAL RADIAL DORSAL FOREARM
LOCATION DETAILED: LEFT SUPERIOR PARIETAL SCALP
LOCATION DETAILED: RIGHT MEDIAL INFERIOR CHEST
LOCATION DETAILED: LEFT PROXIMAL DORSAL FOREARM
LOCATION DETAILED: RIGHT DISTAL DORSAL FOREARM
LOCATION DETAILED: RIGHT RADIAL DORSAL HAND
LOCATION DETAILED: LEFT PROXIMAL RADIAL DORSAL FOREARM
LOCATION DETAILED: RIGHT SUPERIOR PARIETAL SCALP
LOCATION DETAILED: LEFT LATERAL DISTAL PRETIBIAL REGION
LOCATION DETAILED: RIGHT DISTAL RADIAL DORSAL FOREARM
LOCATION DETAILED: SUPERIOR THORACIC SPINE
LOCATION DETAILED: LEFT ANTIHELIX
LOCATION DETAILED: LEFT SUPERIOR MEDIAL FOREHEAD
LOCATION DETAILED: LEFT RADIAL DORSAL HAND
LOCATION DETAILED: RIGHT LATERAL PROXIMAL CALF
LOCATION DETAILED: INFERIOR THORACIC SPINE

## 2024-03-19 ASSESSMENT — LOCATION ZONE DERM
LOCATION ZONE: FACE
LOCATION ZONE: LEG
LOCATION ZONE: HAND
LOCATION ZONE: SCALP
LOCATION ZONE: ARM
LOCATION ZONE: EAR
LOCATION ZONE: TRUNK

## 2024-03-19 ASSESSMENT — LOCATION SIMPLE DESCRIPTION DERM
LOCATION SIMPLE: RIGHT HAND
LOCATION SIMPLE: LEFT HAND
LOCATION SIMPLE: CHEST
LOCATION SIMPLE: LEFT PRETIBIAL REGION
LOCATION SIMPLE: SCALP
LOCATION SIMPLE: RIGHT FOREARM
LOCATION SIMPLE: LEFT EAR
LOCATION SIMPLE: UPPER BACK
LOCATION SIMPLE: LEFT FOREARM
LOCATION SIMPLE: RIGHT LOWER LEG
LOCATION SIMPLE: LEFT FOREHEAD

## 2024-03-19 NOTE — PROCEDURE: LIQUID NITROGEN
Duration Of Freeze Thaw-Cycle (Seconds): 2
Render Note In Bullet Format When Appropriate: No
Show Applicator Variable?: Yes
Detail Level: Detailed
Post-Care Instructions: I reviewed with the patient in detail post-care instructions. Patient is to wear sunprotection, and avoid picking at any of the treated lesions. Pt may apply Vaseline to crusted or scabbing areas.
Consent: The patient's consent was obtained including but not limited to risks of crusting, scabbing, blistering, scarring, darker or lighter pigmentary change, recurrence, incomplete removal and infection.
Number Of Freeze-Thaw Cycles: 3 freeze-thaw cycles

## 2024-03-19 NOTE — PROCEDURE: COUNSELING
Detail Level: Generalized
Sunscreen Recommendations: SPF 30. Apply every two hours.
Detail Level: Detailed
Detail Level: Zone

## 2024-03-19 NOTE — PROCEDURE: OTHER
Other (Free Text): Lesion treated by Dr. Estrada on 5/17/23 via excision.
Note Text (......Xxx Chief Complaint.): This diagnosis correlates with the
Render Risk Assessment In Note?: yes
Detail Level: Simple
Other (Free Text): Patient consent was obtained to proceed with the visit and recommended plan of care after discussion of all risks and benefits, including the risks of COVID-19 exposure.
Detail Level: Generalized

## 2024-03-19 NOTE — PROCEDURE: SUNSCREEN RECOMMENDATIONS
General Sunscreen Counseling: I recommended a broad spectrum sunscreen with a SPF of 30 or higher.  I explained that SPF 30 sunscreens block approximately 97 percent of the sun's harmful rays.  Sunscreens should be applied at least 15 minutes prior to expected sun exposure and then every 2 hours after that as long as sun exposure continues. If swimming or exercising sunscreen should be reapplied every 45 minutes to an hour after getting wet or sweating.  One ounce, or the equivalent of a shot glass full of sunscreen, is adequate to protect the skin not covered by a bathing suit. Sun protective clothing can be used in lieu of sunscreen but must be worn the entire time you are exposed to the sun's rays. Discussed mineral based sunscreen if sensitive to chemicals.
Detail Level: Detailed

## 2024-03-28 NOTE — PROGRESS NOTES
Jardiance.  Follow-up with PCP for further management      Continue meds as below    Current Outpatient Medications:     Misc. Devices (CPAP MACHINE) MISC, by Other route, Disp: , Rfl:     fluocinolone (DERMOTIC) 0.01 % OIL oil, INSTILL FIVE DROPS TO BOTH EARS TWICE A DAY FOR 7 DAYS, Disp: , Rfl:     SPIRONOLACTONE-HCTZ PO, , Disp: , Rfl:     RABEprazole 5 MG CPSP, Take by mouth, Disp: , Rfl:     doxazosin (CARDURA) 4 MG tablet, Take 1 tablet by mouth daily, Disp: 90 tablet, Rfl: 3    spironolactone (ALDACTONE) 50 MG tablet, Take 1 tablet by mouth 2 times daily, Disp: 180 tablet, Rfl: 3    simvastatin (ZOCOR) 40 MG tablet, Take 1 tablet by mouth every evening, Disp: 90 tablet, Rfl: 3    labetalol (NORMODYNE) 200 MG tablet, Take 1 tablet by mouth 2 times daily, Disp: 180 tablet, Rfl: 3    empagliflozin (JARDIANCE) 10 MG tablet, Take 1 tablet by mouth daily, Disp: 90 tablet, Rfl: 3    bumetanide (BUMEX) 2 MG tablet, Take 1 tablet by mouth daily, Disp: 90 tablet, Rfl: 3    RABEprazole (ACIPHEX) 20 MG tablet, Take 1 tablet by mouth daily, Disp: 90 tablet, Rfl: 3    clobetasol (TEMOVATE) 0.05 % cream, Apply topically 2 times daily., Disp: 60 g, Rfl: 1    meloxicam (MOBIC) 7.5 MG tablet, Take 1 tablet by mouth daily, Disp: 30 tablet, Rfl: 3    buPROPion (WELLBUTRIN XL) 150 MG extended release tablet, Take 1 tablet by mouth every morning, Disp: 90 tablet, Rfl: 3    meclizine (ANTIVERT) 12.5 MG tablet, Take 1 tablet by mouth 3 times daily as needed for Dizziness or Nausea, Disp: 30 tablet, Rfl: 11    ondansetron (ZOFRAN-ODT) 4 MG disintegrating tablet, Take 1 tablet by mouth 3 times daily as needed for Nausea or Vomiting, Disp: 21 tablet, Rfl: 5    aspirin 81 MG EC tablet, Take 1 tablet by mouth daily, Disp: , Rfl:     fluticasone (FLONASE) 50 MCG/ACT nasal spray, 2 sprays to each nostril QD, Disp: , Rfl:     Gino Lehman  3/28/2024  12:03 PM    Appropriate evaluation of guideline directed medical therapy for the patient's

## 2024-03-29 ENCOUNTER — TELEPHONE (OUTPATIENT)
Age: 59
End: 2024-03-29

## 2024-03-29 ENCOUNTER — OFFICE VISIT (OUTPATIENT)
Age: 59
End: 2024-03-29
Payer: COMMERCIAL

## 2024-03-29 VITALS
BODY MASS INDEX: 30.46 KG/M2 | DIASTOLIC BLOOD PRESSURE: 78 MMHG | SYSTOLIC BLOOD PRESSURE: 138 MMHG | HEIGHT: 75 IN | WEIGHT: 245 LBS | HEART RATE: 64 BPM

## 2024-03-29 DIAGNOSIS — E11.29 TYPE 2 DIABETES MELLITUS WITH OTHER DIABETIC KIDNEY COMPLICATION, WITHOUT LONG-TERM CURRENT USE OF INSULIN (HCC): ICD-10-CM

## 2024-03-29 DIAGNOSIS — I10 ESSENTIAL (PRIMARY) HYPERTENSION: Primary | ICD-10-CM

## 2024-03-29 DIAGNOSIS — E78.5 HYPERLIPIDEMIA, UNSPECIFIED HYPERLIPIDEMIA TYPE: ICD-10-CM

## 2024-03-29 DIAGNOSIS — G47.33 OSA (OBSTRUCTIVE SLEEP APNEA): ICD-10-CM

## 2024-03-29 DIAGNOSIS — R06.09 DYSPNEA ON EXERTION: ICD-10-CM

## 2024-03-29 DIAGNOSIS — E55.9 VITAMIN D DEFICIENCY: ICD-10-CM

## 2024-03-29 PROCEDURE — 3075F SYST BP GE 130 - 139MM HG: CPT | Performed by: INTERNAL MEDICINE

## 2024-03-29 PROCEDURE — 99214 OFFICE O/P EST MOD 30 MIN: CPT | Performed by: INTERNAL MEDICINE

## 2024-03-29 PROCEDURE — 3044F HG A1C LEVEL LT 7.0%: CPT | Performed by: INTERNAL MEDICINE

## 2024-03-29 PROCEDURE — 3078F DIAST BP <80 MM HG: CPT | Performed by: INTERNAL MEDICINE

## 2024-03-29 RX ORDER — DOXAZOSIN MESYLATE 4 MG/1
4 TABLET ORAL DAILY
Qty: 90 TABLET | Refills: 3 | Status: SHIPPED | OUTPATIENT
Start: 2024-03-29

## 2024-03-29 RX ORDER — SIMVASTATIN 40 MG
40 TABLET ORAL EVERY EVENING
Qty: 90 TABLET | Refills: 3 | Status: SHIPPED | OUTPATIENT
Start: 2024-03-29 | End: 2024-03-29

## 2024-03-29 RX ORDER — BUMETANIDE 2 MG/1
2 TABLET ORAL DAILY
Qty: 90 TABLET | Refills: 3 | Status: SHIPPED | OUTPATIENT
Start: 2024-03-29

## 2024-03-29 RX ORDER — ATORVASTATIN CALCIUM 40 MG/1
40 TABLET, FILM COATED ORAL DAILY
Qty: 90 TABLET | Refills: 3 | Status: SHIPPED | OUTPATIENT
Start: 2024-03-29

## 2024-03-29 RX ORDER — SPIRONOLACTONE 50 MG/1
50 TABLET, FILM COATED ORAL 2 TIMES DAILY
Qty: 180 TABLET | Refills: 3 | Status: SHIPPED | OUTPATIENT
Start: 2024-03-29 | End: 2025-03-29

## 2024-03-29 RX ORDER — LABETALOL 200 MG/1
200 TABLET, FILM COATED ORAL 2 TIMES DAILY
Qty: 180 TABLET | Refills: 3 | Status: SHIPPED | OUTPATIENT
Start: 2024-03-29

## 2024-03-29 NOTE — TELEPHONE ENCOUNTER
Called pharmacy and let them know it should just be the atorvastatin instead of the simvastatin. JS

## 2024-04-04 ENCOUNTER — OFFICE VISIT (OUTPATIENT)
Dept: ENT CLINIC | Age: 59
End: 2024-04-04
Payer: COMMERCIAL

## 2024-04-04 VITALS
DIASTOLIC BLOOD PRESSURE: 78 MMHG | SYSTOLIC BLOOD PRESSURE: 158 MMHG | BODY MASS INDEX: 29.83 KG/M2 | WEIGHT: 245 LBS | HEIGHT: 76 IN

## 2024-04-04 DIAGNOSIS — J34.3 NASAL TURBINATE HYPERTROPHY: ICD-10-CM

## 2024-04-04 DIAGNOSIS — M95.0 NASAL VALVE COLLAPSE: ICD-10-CM

## 2024-04-04 DIAGNOSIS — J34.89 REFRACTORY OBSTRUCTION OF NASAL AIRWAY: Primary | ICD-10-CM

## 2024-04-04 PROCEDURE — 99214 OFFICE O/P EST MOD 30 MIN: CPT | Performed by: OTOLARYNGOLOGY

## 2024-04-04 PROCEDURE — 3078F DIAST BP <80 MM HG: CPT | Performed by: OTOLARYNGOLOGY

## 2024-04-04 PROCEDURE — 3077F SYST BP >= 140 MM HG: CPT | Performed by: OTOLARYNGOLOGY

## 2024-04-04 NOTE — PROGRESS NOTES
Javier Copeland MD [facility]  [address] [place] [id number]  P: [phone]          4/4/2024    Chief Complaint   Patient presents with    Follow-up    Sinus Problem     Rhinitis medicamentosa         HPI:  \"Jonathan\" is a 58 year old male following for rhinitis medicamentosa. He see's some improvement but reports in the morning he wakes up not getting good flow with CPAP on left side. Saline rinses everyday. Utilized Ryaltris sample. Seemed to help some.  Still having difficulty tolerating CPAP at night due to severe episodes of congestion.      Current Outpatient Medications   Medication Sig Dispense Refill    atorvastatin (LIPITOR) 40 MG tablet Take 1 tablet by mouth daily 90 tablet 3    bumetanide (BUMEX) 2 MG tablet Take 1 tablet by mouth daily 90 tablet 3    doxazosin (CARDURA) 4 MG tablet Take 1 tablet by mouth daily 90 tablet 3    empagliflozin (JARDIANCE) 10 MG tablet Take 1 tablet by mouth daily 90 tablet 3    labetalol (NORMODYNE) 200 MG tablet Take 1 tablet by mouth 2 times daily 180 tablet 3    spironolactone (ALDACTONE) 50 MG tablet Take 1 tablet by mouth 2 times daily 180 tablet 3    Misc. Devices (CPAP MACHINE) MISC by Other route      fluocinolone (DERMOTIC) 0.01 % OIL oil INSTILL FIVE DROPS TO BOTH EARS TWICE A DAY FOR 7 DAYS      SPIRONOLACTONE-HCTZ PO       RABEprazole 5 MG CPSP Take by mouth      RABEprazole (ACIPHEX) 20 MG tablet Take 1 tablet by mouth daily 90 tablet 3    clobetasol (TEMOVATE) 0.05 % cream Apply topically 2 times daily. 60 g 1    meloxicam (MOBIC) 7.5 MG tablet Take 1 tablet by mouth daily 30 tablet 3    buPROPion (WELLBUTRIN XL) 150 MG extended release tablet Take 1 tablet by mouth every morning 90 tablet 3    meclizine (ANTIVERT) 12.5 MG tablet Take 1 tablet by mouth 3 times daily as needed for Dizziness or Nausea 30 tablet 11    ondansetron (ZOFRAN-ODT) 4 MG disintegrating tablet Take 1 tablet by mouth 3 times daily as needed for Nausea or Vomiting 21 tablet

## 2024-04-11 ENCOUNTER — TELEPHONE (OUTPATIENT)
Age: 59
End: 2024-04-11

## 2024-04-11 NOTE — TELEPHONE ENCOUNTER
Cardiac Clearance        Physician or Practice Requesting:[facility]  : ?  Contact Phone Number: [phone]  Fax Number: [phone]  Date of Surgery/Procedure:05/13/24  Type of Surgery or Procedure: Sinus SX  Type of Anesthesia: General  Type of Clearance Requested: cardiac and med  Medication to Hold:Aspirin  Days to Hold: 81 mg

## 2024-05-13 ENCOUNTER — OUTSIDE SERVICES (OUTPATIENT)
Dept: ENT CLINIC | Age: 59
End: 2024-05-13
Payer: COMMERCIAL

## 2024-05-13 DIAGNOSIS — J34.3 HYPERTROPHY OF INFERIOR NASAL TURBINATE: Primary | ICD-10-CM

## 2024-05-13 PROCEDURE — 30140 RESECT INFERIOR TURBINATE: CPT | Performed by: OTOLARYNGOLOGY

## 2024-05-13 NOTE — OP NOTE
Operative Note      Patient: Matheus Tyler  YOB: 1965  MRN: [id number]    Date of Procedure: 5/13/2024                Pre-operative Diagnosis:  Refractory nasal obstruction  Bilateral inferior turbinate hypertrophy    Post-op Diagnosis:  Refractory nasal obstruction  Bilateral inferior turbinate hypertrophy    Procedure:  Bilateral endoscopic submucous resection of the inferior nasal turbinates    Surgeon:  Javier Copeland MD     Assistant:  None    Anesthesia Type:  Gen.    EBL:  2 mL    Specimen:  None sent.     Splints/Implants:  None     Findings  Bilateral bony inferior turbinate hypertrophy    Description of Procedure:   Following discussion of the risks, benefits, indications, and alternatives  of the above-mentioned procedure, the patient was taken back to the  operating room and placed supine on the operating room table.  General endotracheal  anesthesia was induced by the Anesthesia Service and consent  was confirmed and time-out was performed.      The head of the patient was then prepped and draped in the usual fashion with the eyes protected. Next, the inferior turbinates were infiltrated with 1% lidocaine with 1 100,000 epinephrine. The nasal mucosa was decongested with Afrin-soaked pledgets bilaterally.  A 15 blade was used to make an incision from the tail to the tip of the undersurface of the left inferior turbinate. Medial and lateral mucoperiosteal flaps were elevated off the turbinate bone, which was then resected. The flaps were then cauterized, reapproximated, and lateralized to the lateral nasal wall. In an identical manner I performed right endoscopic submucous resection of the inferior nasal turbinate.     The nasal passages were then copiously lavaged with saline solution and the mucosa again decongested with Afrin-soaked pledgets. Some warm bacitracin was used to dress the inferior meatus bilaterally.     Patient care was hen turned back to the Anesthesia Service, who

## 2024-05-29 ENCOUNTER — OFFICE VISIT (OUTPATIENT)
Dept: ENT CLINIC | Age: 59
End: 2024-05-29
Payer: COMMERCIAL

## 2024-05-29 VITALS
DIASTOLIC BLOOD PRESSURE: 88 MMHG | BODY MASS INDEX: 29.83 KG/M2 | WEIGHT: 245 LBS | SYSTOLIC BLOOD PRESSURE: 148 MMHG | HEIGHT: 76 IN

## 2024-05-29 DIAGNOSIS — J34.89 NASAL CRUSTING: ICD-10-CM

## 2024-05-29 DIAGNOSIS — Z09 SURGERY FOLLOW-UP: Primary | ICD-10-CM

## 2024-05-29 PROCEDURE — 31237 NSL/SINS NDSC SURG BX POLYPC: CPT | Performed by: OTOLARYNGOLOGY

## 2024-05-29 PROCEDURE — 99213 OFFICE O/P EST LOW 20 MIN: CPT | Performed by: OTOLARYNGOLOGY

## 2024-05-29 NOTE — PROGRESS NOTES
mouth daily      fluticasone (FLONASE) 50 MCG/ACT nasal spray 2 sprays to each nostril QD       No facility-administered encounter medications on file as of 5/29/2024.       Past Medical History:   Diagnosis Date    Adverse effect of anesthesia     after hernia surgery in 2016 here at [facility] \"I had trouble breathing and I blacked out\"    BMI 31.0-31.9,adult     Chronic pain     DDD (degenerative disc disease), cervical 07/20/2016    several surgeries to correct    GERD (gastroesophageal reflux disease)     seldom     History of kidney stones     numerous, lithotripsy x 1    Hypertension     controlled with meds    Kidney stone     Moderate single current episode of major depressive disorder (HCC) 3/16/2018    Nausea & vomiting     sometimes after anesthesia    OA (osteoarthritis)     PUD (peptic ulcer disease)     no recent episodes    Sleep apnea     c-pap    Thromboembolus (HCC) 2010    left knee s/p knee arth    Thromboembolus (HCC) 2007    s/p lithotripsy-     Vertigo        Past Surgical History:   Procedure Laterality Date    APPENDECTOMY  as teen     CERVICAL DISCECTOMY  2015    CERVICAL FUSION  1998    x 3 fusion    CHOLECYSTECTOMY, LAPAROSCOPIC  2011    HERNIA REPAIR      umbilical     HERNIA REPAIR Bilateral 2016    KNEE ARTHROSCOPY Left     left knee x 2    LITHOTRIPSY      LUMBAR LAMINECTOMY  1995    lumbar laminectomy    LYSIS OF ADHESIONS  2012    ORTHOPEDIC SURGERY Left 12/2017    Left knee manipulation    SEPTOPLASTY  2014    SINUS SURGERY      TONSILLECTOMY  1993    TOTAL KNEE ARTHROPLASTY  2018    left     TOTAL KNEE ARTHROPLASTY Left 10/09/2017       Family History   Problem Relation Age of Onset    Stroke Brother     Hypertension Brother     Diabetes Brother     Hypertension Sister     Hypertension Father     Cancer Father         lung    Hypertension Mother     Heart Disease Mother         CHF       Social History     Socioeconomic History    Marital status:      Spouse name: None

## 2024-07-18 DIAGNOSIS — L20.84 INTRINSIC ECZEMA: ICD-10-CM

## 2024-07-18 RX ORDER — CLOBETASOL PROPIONATE 0.5 MG/G
CREAM TOPICAL
Qty: 60 G | Refills: 0 | Status: SHIPPED | OUTPATIENT
Start: 2024-07-18

## 2024-08-21 ENCOUNTER — OFFICE VISIT (OUTPATIENT)
Dept: ENT CLINIC | Age: 59
End: 2024-08-21
Payer: COMMERCIAL

## 2024-08-21 VITALS
BODY MASS INDEX: 30.84 KG/M2 | WEIGHT: 248 LBS | DIASTOLIC BLOOD PRESSURE: 88 MMHG | SYSTOLIC BLOOD PRESSURE: 148 MMHG | HEIGHT: 75 IN

## 2024-08-21 DIAGNOSIS — R51.9 SINUS HEADACHE: Primary | ICD-10-CM

## 2024-08-21 PROCEDURE — 3079F DIAST BP 80-89 MM HG: CPT | Performed by: OTOLARYNGOLOGY

## 2024-08-21 PROCEDURE — 99213 OFFICE O/P EST LOW 20 MIN: CPT | Performed by: OTOLARYNGOLOGY

## 2024-08-21 PROCEDURE — 3077F SYST BP >= 140 MM HG: CPT | Performed by: OTOLARYNGOLOGY

## 2024-08-21 NOTE — PROGRESS NOTES
the lips, gums, hard/soft palate, tongue, tonsillar fossae and oropharynx for mass, lesions or mucosal abnormalities was performed.   The base of tongue and floor of mouth were inspected for lesions and palpated for mass or nodularity.   Mirror exam of the larynx to assess for vocal fold mobility and any gross mass or lesion was performed. Mirror exam of the nasopharynx was attempted, to assess for gross mass or lesion of the nasopharynx or any of adenoidal hyperplasia or inflammation.   Any abnormalities requiring further examination by flexible endoscopy will be described below.      RESPIRATION:   Respiratory effort was assessed for increased work of breathing and inspiratory or expiratory wheezing.   Chest expansion was noted for symmetry.      CARDIOVASCULAR:   Gross examination for peripheral vascular edema and jugular venous distension was performed.          PERTINENT PHYSICAL EXAM FINDINGS   Airway widely patent and mucosa pink and healthy          ASSESSMENT AND PLAN:       ICD-10-CM    1. Sinus headache  R51.9             No evidence of rhinosinusitis. Discussed sleep hygiene.     RTC one year or sooner PRN.      The patient diagnoses and management plan were discussed at length. They  demonstrated and understanding of the plan and stated that all questions were answered to their satisfaction.     PATIENT EDUCATION / INSTRUCTIONS GIVEN FOR:   Sinonasal hygiene   Sleep hygiene

## 2024-09-10 ENCOUNTER — OFFICE VISIT (OUTPATIENT)
Age: 59
End: 2024-09-10
Payer: COMMERCIAL

## 2024-09-10 VITALS
DIASTOLIC BLOOD PRESSURE: 92 MMHG | HEIGHT: 75 IN | WEIGHT: 252 LBS | SYSTOLIC BLOOD PRESSURE: 140 MMHG | HEART RATE: 68 BPM | BODY MASS INDEX: 31.33 KG/M2

## 2024-09-10 DIAGNOSIS — I10 ESSENTIAL (PRIMARY) HYPERTENSION: Primary | ICD-10-CM

## 2024-09-10 DIAGNOSIS — G47.33 OSA (OBSTRUCTIVE SLEEP APNEA): ICD-10-CM

## 2024-09-10 DIAGNOSIS — E11.29 TYPE 2 DIABETES MELLITUS WITH OTHER DIABETIC KIDNEY COMPLICATION, WITHOUT LONG-TERM CURRENT USE OF INSULIN (HCC): ICD-10-CM

## 2024-09-10 DIAGNOSIS — R06.09 DYSPNEA ON EXERTION: ICD-10-CM

## 2024-09-10 PROBLEM — E11.9 TYPE 2 DIABETES MELLITUS (HCC): Status: ACTIVE | Noted: 2024-09-10

## 2024-09-10 PROCEDURE — 3080F DIAST BP >= 90 MM HG: CPT | Performed by: INTERNAL MEDICINE

## 2024-09-10 PROCEDURE — 3044F HG A1C LEVEL LT 7.0%: CPT | Performed by: INTERNAL MEDICINE

## 2024-09-10 PROCEDURE — 99214 OFFICE O/P EST MOD 30 MIN: CPT | Performed by: INTERNAL MEDICINE

## 2024-09-10 PROCEDURE — 3077F SYST BP >= 140 MM HG: CPT | Performed by: INTERNAL MEDICINE

## 2024-09-10 RX ORDER — DOXAZOSIN 4 MG/1
4 TABLET ORAL DAILY
Qty: 90 TABLET | Refills: 3 | Status: SHIPPED | OUTPATIENT
Start: 2024-09-10 | End: 2024-09-10 | Stop reason: ALTCHOICE

## 2024-09-10 RX ORDER — BUMETANIDE 2 MG/1
2 TABLET ORAL DAILY
Qty: 90 TABLET | Refills: 3 | Status: SHIPPED | OUTPATIENT
Start: 2024-09-10

## 2024-09-10 RX ORDER — LABETALOL 200 MG/1
200 TABLET, FILM COATED ORAL 2 TIMES DAILY
Qty: 180 TABLET | Refills: 3 | Status: SHIPPED | OUTPATIENT
Start: 2024-09-10

## 2024-09-10 RX ORDER — SPIRONOLACTONE 50 MG/1
50 TABLET, FILM COATED ORAL 2 TIMES DAILY
Qty: 180 TABLET | Refills: 3 | Status: SHIPPED | OUTPATIENT
Start: 2024-09-10 | End: 2025-09-10

## 2024-10-10 DIAGNOSIS — F32.1 MODERATE SINGLE CURRENT EPISODE OF MAJOR DEPRESSIVE DISORDER (HCC): ICD-10-CM

## 2024-10-11 RX ORDER — BUPROPION HYDROCHLORIDE 150 MG/1
150 TABLET ORAL EVERY MORNING
Qty: 30 TABLET | Refills: 1 | Status: SHIPPED | OUTPATIENT
Start: 2024-10-11

## 2024-11-27 ENCOUNTER — TELEPHONE (OUTPATIENT)
Age: 59
End: 2024-11-27

## 2024-12-09 DIAGNOSIS — F32.1 MODERATE SINGLE CURRENT EPISODE OF MAJOR DEPRESSIVE DISORDER (HCC): ICD-10-CM

## 2024-12-10 RX ORDER — BUPROPION HYDROCHLORIDE 150 MG/1
150 TABLET ORAL EVERY MORNING
Qty: 30 TABLET | Refills: 1 | Status: SHIPPED | OUTPATIENT
Start: 2024-12-10

## 2024-12-16 ENCOUNTER — OFFICE VISIT (OUTPATIENT)
Age: 59
End: 2024-12-16
Payer: COMMERCIAL

## 2024-12-16 VITALS
HEART RATE: 61 BPM | BODY MASS INDEX: 31.46 KG/M2 | DIASTOLIC BLOOD PRESSURE: 81 MMHG | HEIGHT: 75 IN | WEIGHT: 253 LBS | SYSTOLIC BLOOD PRESSURE: 138 MMHG

## 2024-12-16 DIAGNOSIS — E78.5 DYSLIPIDEMIA: ICD-10-CM

## 2024-12-16 DIAGNOSIS — E11.29 TYPE 2 DIABETES MELLITUS WITH OTHER DIABETIC KIDNEY COMPLICATION, WITHOUT LONG-TERM CURRENT USE OF INSULIN (HCC): ICD-10-CM

## 2024-12-16 DIAGNOSIS — G47.33 OSA (OBSTRUCTIVE SLEEP APNEA): ICD-10-CM

## 2024-12-16 DIAGNOSIS — I10 ESSENTIAL (PRIMARY) HYPERTENSION: Primary | ICD-10-CM

## 2024-12-16 PROCEDURE — 3044F HG A1C LEVEL LT 7.0%: CPT | Performed by: INTERNAL MEDICINE

## 2024-12-16 PROCEDURE — 99214 OFFICE O/P EST MOD 30 MIN: CPT | Performed by: INTERNAL MEDICINE

## 2024-12-16 PROCEDURE — 3075F SYST BP GE 130 - 139MM HG: CPT | Performed by: INTERNAL MEDICINE

## 2024-12-16 PROCEDURE — 3079F DIAST BP 80-89 MM HG: CPT | Performed by: INTERNAL MEDICINE

## 2024-12-16 RX ORDER — SPIRONOLACTONE 50 MG/1
50 TABLET, FILM COATED ORAL 2 TIMES DAILY
Qty: 180 TABLET | Refills: 3 | Status: SHIPPED | OUTPATIENT
Start: 2024-12-16 | End: 2025-12-16

## 2024-12-16 RX ORDER — BUMETANIDE 2 MG/1
2 TABLET ORAL DAILY
Qty: 90 TABLET | Refills: 3 | Status: SHIPPED | OUTPATIENT
Start: 2024-12-16

## 2024-12-16 RX ORDER — LABETALOL 200 MG/1
200 TABLET, FILM COATED ORAL 2 TIMES DAILY
Qty: 180 TABLET | Refills: 3 | Status: SHIPPED | OUTPATIENT
Start: 2024-12-16

## 2024-12-16 NOTE — PROGRESS NOTES
[facility] CARDIOLOGY, [address], [address]  [place] [address]  PHONE: [phone]    SUBJECTIVE: /HPI  Matheus Tyler (1965) is a 59 y.o. male seen for a follow up visit regarding the following:   Specialty Problems          Cardiology Problems    Essential hypertension         Pt doing well. No chest pain. No palpitations. Patient denies syncope. No dyspnea. States they are taking meds. Maintains a normal level of activity for them without symptoms. No dizziness or lightheadedness. All above conditions stable.      Past Medical History, Past Surgical History, Family history, Social History, and Medications were all reviewed with the patient today and updated as necessary.    Allergies   Allergen Reactions    Codeine Itching     Past Medical History:   Diagnosis Date    Adverse effect of anesthesia     after hernia surgery in 2016 here at [facility] \"I had trouble breathing and I blacked out\"    BMI 31.0-31.9,adult     Chronic pain     DDD (degenerative disc disease), cervical 07/20/2016    several surgeries to correct    GERD (gastroesophageal reflux disease)     seldom     History of kidney stones     numerous, lithotripsy x 1    Hypertension     controlled with meds    Kidney stone     Moderate single current episode of major depressive disorder (HCC) 3/16/2018    Nausea & vomiting     sometimes after anesthesia    OA (osteoarthritis)     PUD (peptic ulcer disease)     no recent episodes    Sleep apnea     c-pap    Thromboembolus (HCC) 2010    left knee s/p knee arth    Thromboembolus (HCC) 2007    s/p lithotripsy-     Type 2 diabetes mellitus 9/10/2024    Vertigo      Past Surgical History:   Procedure Laterality Date    APPENDECTOMY  as teen     CERVICAL DISCECTOMY  2015    CERVICAL FUSION  1998    x 3 fusion    CHOLECYSTECTOMY, LAPAROSCOPIC  2011    HERNIA REPAIR      umbilical     HERNIA REPAIR Bilateral 2016    KNEE ARTHROSCOPY Left     left knee x 2    LITHOTRIPSY      LUMBAR

## 2024-12-31 ENCOUNTER — APPOINTMENT (OUTPATIENT)
Dept: URBAN - METROPOLITAN AREA CLINIC 330 | Facility: CLINIC | Age: 59
Setting detail: DERMATOLOGY
End: 2024-12-31

## 2024-12-31 DIAGNOSIS — Z85.828 PERSONAL HISTORY OF OTHER MALIGNANT NEOPLASM OF SKIN: ICD-10-CM

## 2024-12-31 DIAGNOSIS — Z80.8 FAMILY HISTORY OF MALIGNANT NEOPLASM OF OTHER ORGANS OR SYSTEMS: ICD-10-CM

## 2024-12-31 DIAGNOSIS — Z85.820 PERSONAL HISTORY OF MALIGNANT MELANOMA OF SKIN: ICD-10-CM

## 2024-12-31 DIAGNOSIS — L57.0 ACTINIC KERATOSIS: ICD-10-CM

## 2024-12-31 DIAGNOSIS — L57.8 OTHER SKIN CHANGES DUE TO CHRONIC EXPOSURE TO NONIONIZING RADIATION: ICD-10-CM

## 2024-12-31 DIAGNOSIS — D22 MELANOCYTIC NEVI: ICD-10-CM | Status: STABLE

## 2024-12-31 PROBLEM — D22.5 MELANOCYTIC NEVI OF TRUNK: Status: ACTIVE | Noted: 2024-12-31

## 2024-12-31 PROCEDURE — ? OTHER

## 2024-12-31 PROCEDURE — ? COUNSELING

## 2024-12-31 PROCEDURE — ? OBSERVATION

## 2024-12-31 PROCEDURE — 17003 DESTRUCT PREMALG LES 2-14: CPT

## 2024-12-31 PROCEDURE — 17000 DESTRUCT PREMALG LESION: CPT

## 2024-12-31 PROCEDURE — ? MEDICAL PHOTOGRAPHY REVIEW

## 2024-12-31 PROCEDURE — ? TREATMENT REGIMEN

## 2024-12-31 PROCEDURE — ? FULL BODY SKIN EXAM

## 2024-12-31 PROCEDURE — ? LIQUID NITROGEN

## 2024-12-31 PROCEDURE — 99214 OFFICE O/P EST MOD 30 MIN: CPT | Mod: 25

## 2024-12-31 PROCEDURE — ? MDM - TREATMENT GOALS

## 2024-12-31 PROCEDURE — ? PRESCRIPTION

## 2024-12-31 PROCEDURE — ? SUNSCREEN RECOMMENDATIONS

## 2024-12-31 RX ORDER — FLUOROURACIL 5 MG/G
CREAM TOPICAL
Qty: 40 | Refills: 1 | Status: ERX | COMMUNITY
Start: 2024-12-31

## 2024-12-31 RX ADMIN — FLUOROURACIL: 5 CREAM TOPICAL at 00:00

## 2024-12-31 ASSESSMENT — LOCATION SIMPLE DESCRIPTION DERM
LOCATION SIMPLE: LEFT CHEEK
LOCATION SIMPLE: UPPER BACK
LOCATION SIMPLE: RIGHT FOREHEAD
LOCATION SIMPLE: RIGHT LOWER LEG
LOCATION SIMPLE: RIGHT OCCIPITAL SCALP
LOCATION SIMPLE: RIGHT SCALP
LOCATION SIMPLE: LEFT EAR
LOCATION SIMPLE: LEFT PRETIBIAL REGION
LOCATION SIMPLE: SCALP
LOCATION SIMPLE: RIGHT FOREARM
LOCATION SIMPLE: CHEST
LOCATION SIMPLE: LEFT FOREARM

## 2024-12-31 ASSESSMENT — LOCATION ZONE DERM
LOCATION ZONE: TRUNK
LOCATION ZONE: LEG
LOCATION ZONE: FACE
LOCATION ZONE: SCALP
LOCATION ZONE: EAR
LOCATION ZONE: ARM

## 2024-12-31 ASSESSMENT — LOCATION DETAILED DESCRIPTION DERM
LOCATION DETAILED: SUPERIOR THORACIC SPINE
LOCATION DETAILED: LEFT SUPERIOR CENTRAL MALAR CHEEK
LOCATION DETAILED: RIGHT MEDIAL FRONTAL SCALP
LOCATION DETAILED: RIGHT LATERAL PROXIMAL CALF
LOCATION DETAILED: RIGHT SUPERIOR OCCIPITAL SCALP
LOCATION DETAILED: RIGHT DISTAL DORSAL FOREARM
LOCATION DETAILED: RIGHT SUPERIOR FOREHEAD
LOCATION DETAILED: RIGHT SUPERIOR PARIETAL SCALP
LOCATION DETAILED: INFERIOR THORACIC SPINE
LOCATION DETAILED: LEFT ANTIHELIX
LOCATION DETAILED: LEFT DISTAL DORSAL FOREARM
LOCATION DETAILED: RIGHT MEDIAL INFERIOR CHEST
LOCATION DETAILED: LEFT LATERAL DISTAL PRETIBIAL REGION
LOCATION DETAILED: LEFT SUPERIOR PARIETAL SCALP

## 2024-12-31 NOTE — PROCEDURE: TREATMENT REGIMEN
Detail Level: Zone
Samples Given: Eucerin advanced repair sample given for pt to pretreat arms bid x 2 weeks

## 2024-12-31 NOTE — PROCEDURE: OTHER
Other (Free Text): Lesion treated by Dr. Estrada on 5/17/23 via excision.
Note Text (......Xxx Chief Complaint.): This diagnosis correlates with the
Render Risk Assessment In Note?: yes
Detail Level: Simple

## 2024-12-31 NOTE — PROCEDURE: REASSURANCE
Detail Level: Detailed
When Should The Patient Follow-Up For Their Next Full-Body Skin Exam?: 6 Months
Quality 137: Melanoma: Continuity Of Care - Recall System: Patient information entered into a recall system that includes: target date for the next exam specified AND a process to follow up with patients regarding missed or unscheduled appointments
Hide Additional Notes?: No
Additional Notes (Optional): Compared to photo this lesion has resolved

## 2025-01-05 DIAGNOSIS — L20.84 INTRINSIC ECZEMA: ICD-10-CM

## 2025-01-05 DIAGNOSIS — F32.1 MODERATE SINGLE CURRENT EPISODE OF MAJOR DEPRESSIVE DISORDER (HCC): ICD-10-CM

## 2025-01-07 RX ORDER — BUPROPION HYDROCHLORIDE 150 MG/1
150 TABLET ORAL EVERY MORNING
Qty: 30 TABLET | Refills: 3 | Status: SHIPPED | OUTPATIENT
Start: 2025-01-07

## 2025-01-07 RX ORDER — CLOBETASOL PROPIONATE 0.5 MG/G
CREAM TOPICAL
Qty: 60 G | Refills: 0 | Status: SHIPPED | OUTPATIENT
Start: 2025-01-07

## 2025-01-09 ENCOUNTER — TELEMEDICINE (OUTPATIENT)
Dept: FAMILY MEDICINE CLINIC | Facility: CLINIC | Age: 60
End: 2025-01-09
Payer: COMMERCIAL

## 2025-01-09 DIAGNOSIS — R42 DIZZINESS: ICD-10-CM

## 2025-01-09 DIAGNOSIS — J32.9 RECURRENT SINUSITIS: ICD-10-CM

## 2025-01-09 DIAGNOSIS — R09.82 POST-NASAL DRIP: Primary | ICD-10-CM

## 2025-01-09 PROCEDURE — 99213 OFFICE O/P EST LOW 20 MIN: CPT

## 2025-01-09 RX ORDER — DOXYCYCLINE 100 MG/1
100 CAPSULE ORAL 2 TIMES DAILY
COMMUNITY
Start: 2025-01-03 | End: 2025-01-13

## 2025-01-09 RX ORDER — BENZONATATE 100 MG/1
CAPSULE ORAL
Qty: 20 CAPSULE | Refills: 0 | Status: SHIPPED | OUTPATIENT
Start: 2025-01-09

## 2025-01-09 RX ORDER — FLUTICASONE PROPIONATE 50 MCG
2 SPRAY, SUSPENSION (ML) NASAL DAILY
Qty: 16 G | Refills: 0 | Status: SHIPPED | OUTPATIENT
Start: 2025-01-09

## 2025-01-09 RX ORDER — AZELASTINE 1 MG/ML
1 SPRAY, METERED NASAL 2 TIMES DAILY
Qty: 60 ML | Refills: 1 | Status: SHIPPED | OUTPATIENT
Start: 2025-01-09

## 2025-01-09 ASSESSMENT — PATIENT HEALTH QUESTIONNAIRE - PHQ9
9. THOUGHTS THAT YOU WOULD BE BETTER OFF DEAD, OR OF HURTING YOURSELF: NOT AT ALL
8. MOVING OR SPEAKING SO SLOWLY THAT OTHER PEOPLE COULD HAVE NOTICED. OR THE OPPOSITE, BEING SO FIGETY OR RESTLESS THAT YOU HAVE BEEN MOVING AROUND A LOT MORE THAN USUAL: NOT AT ALL
7. TROUBLE CONCENTRATING ON THINGS, SUCH AS READING THE NEWSPAPER OR WATCHING TELEVISION: NEARLY EVERY DAY
5. POOR APPETITE OR OVEREATING: NOT AT ALL
4. FEELING TIRED OR HAVING LITTLE ENERGY: NEARLY EVERY DAY
1. LITTLE INTEREST OR PLEASURE IN DOING THINGS: NEARLY EVERY DAY
SUM OF ALL RESPONSES TO PHQ QUESTIONS 1-9: 12
10. IF YOU CHECKED OFF ANY PROBLEMS, HOW DIFFICULT HAVE THESE PROBLEMS MADE IT FOR YOU TO DO YOUR WORK, TAKE CARE OF THINGS AT HOME, OR GET ALONG WITH OTHER PEOPLE: NOT DIFFICULT AT ALL
SUM OF ALL RESPONSES TO PHQ QUESTIONS 1-9: 12
SUM OF ALL RESPONSES TO PHQ QUESTIONS 1-9: 12
2. FEELING DOWN, DEPRESSED OR HOPELESS: NOT AT ALL
SUM OF ALL RESPONSES TO PHQ9 QUESTIONS 1 & 2: 3
3. TROUBLE FALLING OR STAYING ASLEEP: NEARLY EVERY DAY
SUM OF ALL RESPONSES TO PHQ QUESTIONS 1-9: 12
6. FEELING BAD ABOUT YOURSELF - OR THAT YOU ARE A FAILURE OR HAVE LET YOURSELF OR YOUR FAMILY DOWN: NOT AT ALL

## 2025-01-09 ASSESSMENT — ENCOUNTER SYMPTOMS
SORE THROAT: 1
COUGH: 1
ABDOMINAL PAIN: 0
SINUS PAIN: 1
NAUSEA: 0
RHINORRHEA: 1

## 2025-01-09 NOTE — PROGRESS NOTES
-    Neck: [x] No visualized mass [] Abnormal -     Pulmonary/Chest: [x] Respiratory effort normal   [x] No visualized signs of difficulty breathing or respiratory distress        [] Abnormal - cough     Musculoskeletal:   [x] Normal gait with no signs of ataxia         [x] Normal range of motion of neck        [] Abnormal -     Neurological:        [x] No Facial Asymmetry (Cranial nerve 7 motor function) (limited exam due to video visit)          [x] No gaze palsy        [] Abnormal -          Skin:        [x] No significant exanthematous lesions or discoloration noted on facial skin         [] Abnormal -            Psychiatric:       [x] Normal Affect [] Abnormal -        [x] No Hallucinations    Other pertinent observable physical exam findings:-             --Shantell Aguila MD

## 2025-01-13 NOTE — TELEPHONE ENCOUNTER
Requested Prescriptions     Pending Prescriptions Disp Refills    doxazosin (CARDURA XL) 4 MG extended release tablet 180 tablet 3     Sig: Take 1 tablet by mouth in the morning and at bedtime           Verified rx. Last OV 12/16/24. Erx to pharm on file.    
none

## 2025-02-07 ENCOUNTER — TELEPHONE (OUTPATIENT)
Dept: SLEEP MEDICINE | Age: 60
End: 2025-02-07

## 2025-02-10 ENCOUNTER — OFFICE VISIT (OUTPATIENT)
Dept: SLEEP MEDICINE | Age: 60
End: 2025-02-10
Payer: COMMERCIAL

## 2025-02-10 VITALS
WEIGHT: 257 LBS | DIASTOLIC BLOOD PRESSURE: 88 MMHG | HEART RATE: 65 BPM | SYSTOLIC BLOOD PRESSURE: 154 MMHG | RESPIRATION RATE: 14 BRPM | OXYGEN SATURATION: 95 % | BODY MASS INDEX: 32.98 KG/M2 | HEIGHT: 74 IN

## 2025-02-10 DIAGNOSIS — G47.33 OSA (OBSTRUCTIVE SLEEP APNEA): Primary | ICD-10-CM

## 2025-02-10 DIAGNOSIS — G47.00 PERSISTENT DISORDER OF INITIATING OR MAINTAINING SLEEP: ICD-10-CM

## 2025-02-10 DIAGNOSIS — G47.34 NOCTURNAL HYPOXEMIA: ICD-10-CM

## 2025-02-10 PROCEDURE — G2211 COMPLEX E/M VISIT ADD ON: HCPCS | Performed by: NURSE PRACTITIONER

## 2025-02-10 PROCEDURE — 99213 OFFICE O/P EST LOW 20 MIN: CPT | Performed by: NURSE PRACTITIONER

## 2025-02-10 PROCEDURE — 3077F SYST BP >= 140 MM HG: CPT | Performed by: NURSE PRACTITIONER

## 2025-02-10 PROCEDURE — 3079F DIAST BP 80-89 MM HG: CPT | Performed by: NURSE PRACTITIONER

## 2025-02-10 RX ORDER — TRAZODONE HYDROCHLORIDE 50 MG/1
25 TABLET, FILM COATED ORAL NIGHTLY
Qty: 30 TABLET | Refills: 0 | Status: SHIPPED | OUTPATIENT
Start: 2025-02-10

## 2025-02-10 ASSESSMENT — SLEEP AND FATIGUE QUESTIONNAIRES
HOW LIKELY ARE YOU TO NOD OFF OR FALL ASLEEP WHEN YOU ARE A PASSENGER IN A CAR FOR AN HOUR WITHOUT A BREAK: SLIGHT CHANCE OF DOZING
HOW LIKELY ARE YOU TO NOD OFF OR FALL ASLEEP WHILE WATCHING TV: WOULD NEVER DOZE
HOW LIKELY ARE YOU TO NOD OFF OR FALL ASLEEP WHILE SITTING INACTIVE IN A PUBLIC PLACE: WOULD NEVER DOZE
HOW LIKELY ARE YOU TO NOD OFF OR FALL ASLEEP WHILE SITTING QUIETLY AFTER LUNCH WITHOUT ALCOHOL: WOULD NEVER DOZE
ESS TOTAL SCORE: 2
HOW LIKELY ARE YOU TO NOD OFF OR FALL ASLEEP WHILE LYING DOWN TO REST IN THE AFTERNOON WHEN CIRCUMSTANCES PERMIT: SLIGHT CHANCE OF DOZING
HOW LIKELY ARE YOU TO NOD OFF OR FALL ASLEEP WHILE SITTING AND TALKING TO SOMEONE: WOULD NEVER DOZE
HOW LIKELY ARE YOU TO NOD OFF OR FALL ASLEEP WHILE SITTING AND READING: WOULD NEVER DOZE
HOW LIKELY ARE YOU TO NOD OFF OR FALL ASLEEP IN A CAR, WHILE STOPPED FOR A FEW MINUTES IN TRAFFIC: WOULD NEVER DOZE

## 2025-02-10 NOTE — PATIENT INSTRUCTIONS
Continue CPAP 7-12 cm H2O with nightly compliance  New CPAP supplies ordered  Trazodone prescribed  Recommendations as above  Follow-up in 1 year or sooner if needed

## 2025-02-10 NOTE — PROGRESS NOTES
[facility]  [address]  [place] [address]  [phone]    Patient Name:  Matheus Tyler  YOB: 1965      Office Visit 2/10/2025    CHIEF COMPLAINT:    Chief Complaint   Patient presents with    Sleep Apnea         HISTORY OF PRESENT ILLNESS:  Patient is a 59 y.o. male seen today for follow up of MELISSA.  Diagnostic sleep study on 08/02/2016 with an AHI of 8.5 and lowest oxygen saturation of 87%. He is prescribed cpap therapy with a humidifier set at 7-12 cm with a full face mask. Most recent download reveals AHI on PAP therapy is 1.1, leak is median 2.2 and 24.0 at 95th percentile and the hourly usage is 7 hours 46 minutes nightly. The overall use is 2793 hours with days greater than four hours at 360/365. The patient is compliant with the Pap therapy and is feeling better as a result.    His compliance with CPAP the last year has been very good.  He does report that he has had continued problems with awakening every night around 2-3 AM and then has difficulty going back to sleep.  States that this has been going on for few years now and he continually feels exhausted due to not getting a full night of sleep.  Ranier score 2/24.  He did try melatonin since his last visit but reports this did not help at all.  We did discuss other sleep aids and he would like to try trazodone to see if this will help.  He denies any major medical changes over the last year.  Reports that his weight has consistently been around 255 pounds.  His blood pressure is slightly elevated today at 154/88.  He reports that they did recently adjust his blood pressure medications.    Ranier Sleepiness Scale      2/10/2025     8:09 AM 1/26/2024     7:58 AM 6/15/2022     7:50 AM 3/7/2022     8:32 AM   Sleep Medicine   Sitting and reading 0 0 0 1   Watching TV 0 0 0 1   Sitting, inactive in a public place (e.g. a theatre or a meeting) 0 0 0 1   As a passenger in a car for an hour without a break 1

## 2025-03-13 DIAGNOSIS — K21.9 GASTROESOPHAGEAL REFLUX DISEASE WITHOUT ESOPHAGITIS: ICD-10-CM

## 2025-03-13 RX ORDER — RABEPRAZOLE SODIUM 20 MG/1
20 TABLET, DELAYED RELEASE ORAL DAILY
Qty: 90 TABLET | Refills: 0 | Status: SHIPPED | OUTPATIENT
Start: 2025-03-13

## 2025-03-13 NOTE — TELEPHONE ENCOUNTER
Refill requested:   RABEprazole (ACIPHEX) 20 MG tablet      [facility] [address] in [place]    Patient will be out of this med on Saturday.

## 2025-03-25 ENCOUNTER — APPOINTMENT (OUTPATIENT)
Dept: URBAN - METROPOLITAN AREA CLINIC 330 | Facility: CLINIC | Age: 60
Setting detail: DERMATOLOGY
End: 2025-03-25

## 2025-03-25 DIAGNOSIS — D485 NEOPLASM OF UNCERTAIN BEHAVIOR OF SKIN: ICD-10-CM

## 2025-03-25 DIAGNOSIS — L57.0 ACTINIC KERATOSIS: ICD-10-CM

## 2025-03-25 PROBLEM — D48.5 NEOPLASM OF UNCERTAIN BEHAVIOR OF SKIN: Status: ACTIVE | Noted: 2025-03-25

## 2025-03-25 PROCEDURE — ? COUNSELING

## 2025-03-25 PROCEDURE — 11102 TANGNTL BX SKIN SINGLE LES: CPT

## 2025-03-25 PROCEDURE — ? LIQUID NITROGEN

## 2025-03-25 PROCEDURE — 17003 DESTRUCT PREMALG LES 2-14: CPT

## 2025-03-25 PROCEDURE — ? BIOPSY BY SHAVE METHOD

## 2025-03-25 PROCEDURE — 17000 DESTRUCT PREMALG LESION: CPT | Mod: 59

## 2025-03-25 ASSESSMENT — LOCATION SIMPLE DESCRIPTION DERM
LOCATION SIMPLE: LEFT CHEEK
LOCATION SIMPLE: LEFT FOREHEAD
LOCATION SIMPLE: RIGHT FOREHEAD

## 2025-03-25 ASSESSMENT — LOCATION DETAILED DESCRIPTION DERM
LOCATION DETAILED: LEFT SUPERIOR FOREHEAD
LOCATION DETAILED: LEFT CENTRAL MALAR CHEEK
LOCATION DETAILED: RIGHT SUPERIOR MEDIAL FOREHEAD

## 2025-03-25 ASSESSMENT — LOCATION ZONE DERM: LOCATION ZONE: FACE

## 2025-03-25 ASSESSMENT — PAIN INTENSITY VAS: HOW INTENSE IS YOUR PAIN 0 BEING NO PAIN, 10 BEING THE MOST SEVERE PAIN POSSIBLE?: 1/10 PAIN

## 2025-03-25 NOTE — PROCEDURE: BIOPSY BY SHAVE METHOD
Detail Level: Detailed
Depth Of Biopsy: dermis
Was A Bandage Applied: Yes
Size Of Lesion In Cm: 0
Accession #: Skin Pathology
Biopsy Type: H and E
Biopsy Method: Dermablade
Anesthesia Type: 1% lidocaine with epinephrine and a 1:10 solution of 8.4% sodium bicarbonate
Anesthesia Volume In Cc: 0.5
Hemostasis: Aluminum Chloride
Wound Care: Petrolatum
Dressing: bandage
Destruction After The Procedure: No
Type Of Destruction Used: Curettage
Curettage Text: The wound bed was treated with curettage after the biopsy was performed.
Cryotherapy Text: The wound bed was treated with cryotherapy after the biopsy was performed.
Electrodesiccation Text: The wound bed was treated with electrodesiccation after the biopsy was performed.
Electrodesiccation And Curettage Text: The wound bed was treated with electrodesiccation and curettage after the biopsy was performed.
Silver Nitrate Text: The wound bed was treated with silver nitrate after the biopsy was performed.
Lab: 6
Lab Facility: 3
Medical Necessity Information: It is in your best interest to select a reason for this procedure from the list below. All of these items fulfill various CMS LCD requirements except the new and changing color options.
Consent: Written consent was obtained and risks were reviewed including but not limited to scarring, infection, bleeding, scabbing, incomplete removal, nerve damage and allergy to anesthesia.
Post-Care Instructions: I reviewed with the patient in detail post-care instructions. Patient is to keep the biopsy site dry overnight, and then apply bacitracin twice daily until healed. Patient may apply hydrogen peroxide soaks to remove any crusting.
Notification Instructions: Patient will be notified of biopsy results. However, patient instructed to call the office if not contacted within 2 weeks.
Billing Type: Third-Party Bill
Information: Selecting Yes will display possible errors in your note based on the variables you have selected. This validation is only offered as a suggestion for you. PLEASE NOTE THAT THE VALIDATION TEXT WILL BE REMOVED WHEN YOU FINALIZE YOUR NOTE. IF YOU WANT TO FAX A PRELIMINARY NOTE YOU WILL NEED TO TOGGLE THIS TO 'NO' IF YOU DO NOT WANT IT IN YOUR FAXED NOTE.

## 2025-03-25 NOTE — PROCEDURE: LIQUID NITROGEN
Number Of Freeze-Thaw Cycles: 3 freeze-thaw cycles
Render Note In Bullet Format When Appropriate: No
Duration Of Freeze Thaw-Cycle (Seconds): 2
Detail Level: Detailed
Consent: The patient's consent was obtained including but not limited to risks of crusting, scabbing, blistering, scarring, darker or lighter pigmentary change, recurrence, incomplete removal and infection.
Post-Care Instructions: I reviewed with the patient in detail post-care instructions. Patient is to wear sunprotection, and avoid picking at any of the treated lesions. Pt may apply Vaseline to crusted or scabbing areas.
Show Applicator Variable?: Yes

## 2025-04-01 ENCOUNTER — APPOINTMENT (OUTPATIENT)
Dept: URBAN - METROPOLITAN AREA CLINIC 330 | Facility: CLINIC | Age: 60
Setting detail: DERMATOLOGY
End: 2025-04-01

## 2025-04-01 PROBLEM — C44.92 SQUAMOUS CELL CARCINOMA OF SKIN, UNSPECIFIED: Status: ACTIVE | Noted: 2025-04-01

## 2025-04-01 PROCEDURE — ? REFERRAL

## 2025-04-14 ENCOUNTER — APPOINTMENT (OUTPATIENT)
Dept: URBAN - METROPOLITAN AREA CLINIC 330 | Facility: CLINIC | Age: 60
Setting detail: DERMATOLOGY
End: 2025-04-14

## 2025-04-14 VITALS — HEART RATE: 60 BPM | SYSTOLIC BLOOD PRESSURE: 162 MMHG | DIASTOLIC BLOOD PRESSURE: 93 MMHG | OXYGEN SATURATION: 96 %

## 2025-04-14 PROBLEM — C44.329 SQUAMOUS CELL CARCINOMA OF SKIN OF OTHER PARTS OF FACE: Status: ACTIVE | Noted: 2025-04-14

## 2025-04-14 PROCEDURE — 17311 MOHS 1 STAGE H/N/HF/G: CPT

## 2025-04-14 PROCEDURE — 17312 MOHS ADDL STAGE: CPT

## 2025-04-14 PROCEDURE — ? PRESCRIPTION

## 2025-04-14 PROCEDURE — ? MOHS SURGERY

## 2025-04-14 PROCEDURE — 12052 INTMD RPR FACE/MM 2.6-5.0 CM: CPT

## 2025-04-14 RX ORDER — MUPIROCIN 20 MG/G
OINTMENT TOPICAL BID
Qty: 22 | Refills: 0 | Status: ERX | COMMUNITY
Start: 2025-04-14

## 2025-04-14 RX ADMIN — MUPIROCIN: 20 OINTMENT TOPICAL at 00:00

## 2025-04-14 NOTE — PROCEDURE: MOHS SURGERY
Mohs Case Number: [id number]
Date Of Previous Biopsy (Optional): 03.25.25
Previous Accession (Optional): [id number]
Biopsy Photograph Reviewed: Yes
Consent Type: Consent 1 (Standard)
Eye Shield Used: No
Initial Size Of Lesion: 0.9
X Size Of Lesion In Cm (Optional): 0.7
Number Of Stages: 2
Primary Defect Length In Cm (Final Defect Size - Required For Flaps/Grafts): 1.5
Primary Defect Width In Cm (Final Defect Size - Required For Flaps/Grafts): 1.4
Primary Defect Depth In Cm (Optional But Required For Some Insurers): 0
Repair Type: Intermediate Layered Repair
Which Instrument Did You Use For Dermabrasion?: Wire Brush
Which Eyelid Repair Cpt Are You Using?: 40336
Oculoplastic Surgeon Procedure Text (A): After obtaining clear surgical margins the patient was sent to oculoplastics for surgical repair.  The patient understands they will receive post-surgical care and follow-up from the referring physician's office.
Otolaryngologist Procedure Text (A): After obtaining clear surgical margins the patient was sent to otolaryngology for surgical repair.  The patient understands they will receive post-surgical care and follow-up from the referring physician's office.
Plastic Surgeon Procedure Text (A): After obtaining clear surgical margins the patient was sent to plastics for surgical repair.  The patient understands they will receive post-surgical care and follow-up from the referring physician's office.
Mid-Level Procedure Text (A): After obtaining clear surgical margins the patient was sent to a mid-level provider for surgical repair.  The patient understands they will receive post-surgical care and follow-up from the mid-level provider.
Provider Procedure Text (A): After obtaining clear surgical margins the defect was repaired by another provider.
Asc Procedure Text (A): After obtaining clear surgical margins the patient was sent to an ASC for surgical repair.  The patient understands they will receive post-surgical care and follow-up from the ASC physician.
Simple / Intermediate / Complex Repair - Final Wound Length In Cm: 4.5
Deep Sutures: 4-0 Monocryl
Epidermal Sutures: 5-0 Fast Absorbing Gut
Suturegard Retention Suture: 2-0 Nylon
Retention Suture Bite Size: 3 mm
Length To Time In Minutes Device Was In Place: 10
Number Of Hemigard Strips Per Side: 1
Undermining Type: Entire Wound
Debridement Text: The wound edges were debrided prior to proceeding with the closure to facilitate wound healing.
Helical Rim Text: The closure involved the helical rim.
Vermilion Border Text: The closure involved the vermilion border.
Nostril Rim Text: The closure involved the nostril rim.
Retention Suture Text: Retention sutures were placed to support the closure and prevent dehiscence.
Location Indication Override (Is Already Calculated Based On Selected Body Location): Area M
Area H Indication Text: Tumors in this location are included in Area H (eyelids, eyebrows, nose, lips, chin, ear, pre-auricular, post-auricular, temple, genitalia, hands, feet, ankles and areola).  Tissue conservation is critical in these anatomic locations.
Area M Indication Text: Tumors in this location are included in Area M (cheek, forehead, scalp, neck, jawline and pretibial skin).  Mohs surgery is indicated for tumors in these anatomic locations.
Area L Indication Text: Tumors in this location are included in Area L (trunk and extremities).  Mohs surgery is indicated for larger tumors, or tumors with aggressive histologic features, in these anatomic locations.
Tumor Debulked?: dermablade
Depth Of Tumor Invasion (For Histology): dermis
Perineural Invasion (For Histology - Be Specific If Possible): absent
Special Stains Stage 1 - Results: Base On Clearance Noted Above
Stage 2: Additional Anesthesia Type: 1% lidocaine with epinephrine
Staging Info: By selecting yes to the question above you will include information on AJCC 8 tumor staging in your Mohs note. Information on tumor staging will be automatically added for SCCs on the head and neck. AJCC 8 includes tumor size, tumor depth, perineural involvement and bone invasion.
Tumor Depth: Less than 6mm from granular layer and no invasion beyond the subcutaneous fat
Was The Patient On Physician Recommended Anticoagulation Therapy?: Please Select the Appropriate Response
Medical Necessity Statement: Based on my medical judgement, Mohs surgery is the most appropriate treatment for this cancer compared to other treatments.
Alternatives Discussed Intro (Do Not Add Period): I discussed alternative treatments to Mohs surgery and specifically discussed the risks and benefits of
Consent 1/Introductory Paragraph: The rationale for Mohs was explained to the patient and consent was obtained. The risks, benefits and alternatives to therapy were discussed in detail. Specifically, the risks of infection, scarring, bleeding, prolonged wound healing, incomplete removal, allergy to anesthesia, nerve injury and recurrence were addressed. Prior to the procedure, the treatment site was clearly identified and confirmed by the patient. All components of Universal Protocol/PAUSE Rule completed.
Consent 2/Introductory Paragraph: Mohs surgery was explained to the patient and consent was obtained. The risks, benefits and alternatives to therapy were discussed in detail. Specifically, the risks of infection, scarring, bleeding, prolonged wound healing, incomplete removal, allergy to anesthesia, nerve injury and recurrence were addressed. Prior to the procedure, the treatment site was clearly identified and confirmed by the patient. All components of Universal Protocol/PAUSE Rule completed.
Consent 3/Introductory Paragraph: I gave the patient a chance to ask questions they had about the procedure.  Following this I explained the Mohs procedure and consent was obtained. The risks, benefits and alternatives to therapy were discussed in detail. Specifically, the risks of infection, scarring, bleeding, prolonged wound healing, incomplete removal, allergy to anesthesia, nerve injury and recurrence were addressed. Prior to the procedure, the treatment site was clearly identified and confirmed by the patient. All components of Universal Protocol/PAUSE Rule completed.
Consent (Temporal Branch)/Introductory Paragraph: The rationale for Mohs was explained to the patient and consent was obtained. The risks, benefits and alternatives to therapy were discussed in detail. Specifically, the risks of damage to the temporal branch of the facial nerve, infection, scarring, bleeding, prolonged wound healing, incomplete removal, allergy to anesthesia, and recurrence were addressed. Prior to the procedure, the treatment site was clearly identified and confirmed by the patient. All components of Universal Protocol/PAUSE Rule completed.
Consent (Marginal Mandibular)/Introductory Paragraph: The rationale for Mohs was explained to the patient and consent was obtained. The risks, benefits and alternatives to therapy were discussed in detail. Specifically, the risks of damage to the marginal mandibular branch of the facial nerve, infection, scarring, bleeding, prolonged wound healing, incomplete removal, allergy to anesthesia, and recurrence were addressed. Prior to the procedure, the treatment site was clearly identified and confirmed by the patient. All components of Universal Protocol/PAUSE Rule completed.
Consent (Spinal Accessory)/Introductory Paragraph: The rationale for Mohs was explained to the patient and consent was obtained. The risks, benefits and alternatives to therapy were discussed in detail. Specifically, the risks of damage to the spinal accessory nerve, infection, scarring, bleeding, prolonged wound healing, incomplete removal, allergy to anesthesia, and recurrence were addressed. Prior to the procedure, the treatment site was clearly identified and confirmed by the patient. All components of Universal Protocol/PAUSE Rule completed.
Consent (Near Eyelid Margin)/Introductory Paragraph: The rationale for Mohs was explained to the patient and consent was obtained. The risks, benefits and alternatives to therapy were discussed in detail. Specifically, the risks of ectropion or eyelid deformity, infection, scarring, bleeding, prolonged wound healing, incomplete removal, allergy to anesthesia, nerve injury and recurrence were addressed. Prior to the procedure, the treatment site was clearly identified and confirmed by the patient. All components of Universal Protocol/PAUSE Rule completed.
Consent (Ear)/Introductory Paragraph: The rationale for Mohs was explained to the patient and consent was obtained. The risks, benefits and alternatives to therapy were discussed in detail. Specifically, the risks of ear deformity, infection, scarring, bleeding, prolonged wound healing, incomplete removal, allergy to anesthesia, nerve injury and recurrence were addressed. Prior to the procedure, the treatment site was clearly identified and confirmed by the patient. All components of Universal Protocol/PAUSE Rule completed.
Consent (Nose)/Introductory Paragraph: The rationale for Mohs was explained to the patient and consent was obtained. The risks, benefits and alternatives to therapy were discussed in detail. Specifically, the risks of nasal deformity, changes in the flow of air through the nose, infection, scarring, bleeding, prolonged wound healing, incomplete removal, allergy to anesthesia, nerve injury and recurrence were addressed. Prior to the procedure, the treatment site was clearly identified and confirmed by the patient. All components of Universal Protocol/PAUSE Rule completed.
Consent (Lip)/Introductory Paragraph: The rationale for Mohs was explained to the patient and consent was obtained. The risks, benefits and alternatives to therapy were discussed in detail. Specifically, the risks of lip deformity, changes in the oral aperture, infection, scarring, bleeding, prolonged wound healing, incomplete removal, allergy to anesthesia, nerve injury and recurrence were addressed. Prior to the procedure, the treatment site was clearly identified and confirmed by the patient. All components of Universal Protocol/PAUSE Rule completed.
Consent (Scalp)/Introductory Paragraph: The rationale for Mohs was explained to the patient and consent was obtained. The risks, benefits and alternatives to therapy were discussed in detail. Specifically, the risks of changes in hair growth pattern secondary to repair, infection, scarring, bleeding, prolonged wound healing, incomplete removal, allergy to anesthesia, nerve injury and recurrence were addressed. Prior to the procedure, the treatment site was clearly identified and confirmed by the patient. All components of Universal Protocol/PAUSE Rule completed.
Detail Level: Detailed
Postop Diagnosis: same
Surgeon: Natalie Colón MD
Anesthesia Volume In Cc: 6
Hemostasis: Electrocautery
Estimated Blood Loss (Cc): minimal
Brow Lift Text: A midfrontal incision was made medially to the defect to allow access to the tissues just superior to the left eyebrow. Following careful dissection inferiorly in a supraperiosteal plane to the level of the left eyebrow, several 3-0 monocryl sutures were used to resuspend the eyebrow orbicularis oculi muscular unit to the superior frontal bone periosteum. This resulted in an appropriate reapproximation of static eyebrow symmetry and correction of the left brow ptosis.
Epidermal Closure: running
Suturegard Intro: Intraoperative tissue expansion was performed, utilizing the SUTUREGARD device, in order to reduce wound tension.
Suturegard Body: The suture ends were repeatedly re-tightened and re-clamped to achieve the desired tissue expansion.
Hemigard Intro: Due to skin fragility and wound tension, it was decided to use HEMIGARD adhesive retention suture devices to permit a linear closure. The skin was cleaned and dried for a 6cm distance away from the wound. Excessive hair, if present, was removed to allow for adhesion.
Hemigard Postcare Instructions: The HEMIGARD strips are to remain completely dry for at least 5-7 days.
Donor Site Anesthesia Type: same as repair anesthesia
Epidermal Closure Graft Donor Site (Optional): simple interrupted
Graft Donor Site Bandage (Optional-Leave Blank If You Don't Want In Note): Steri-strips and a pressure bandage were applied to the donor site.
Closure 2 Information: This tab is for additional flaps and grafts, including complex repair and grafts and complex repair and flaps. You can also specify a different location for the additional defect, if the location is the same you do not need to select a new one. We will insert the automated text for the repair you select below just as we do for solitary flaps and grafts. Please note that at this time if you select a location with a different insurance zone you will need to override the ICD10 and CPT if appropriate.
Closure 3 Information: This tab is for additional flaps and grafts above and beyond our usual structured repairs.  Please note if you enter information here it will not currently bill and you will need to add the billing information manually.
Wound Care: Petrolatum
Dressing: pressure dressing
Dressing (No Sutures): dry sterile dressing
Unna Boot Text: An Unna boot was placed to help immobilize the limb and facilitate more rapid healing.
Home Suture Removal Text: Patient was provided instructions on removing sutures and will remove their sutures at home.  If they have any questions or difficulties they will call the office.
Post-Care Instructions: I reviewed with the patient in detail post-care instructions. Patient is not to engage in any heavy lifting, exercise, or swimming for the next 14 days. Should the patient develop any fevers, chills, bleeding, severe pain patient will contact the office immediately.
Pain Refusal Text: I offered to prescribe pain medication but the patient refused to take this medication.
Mauc Instructions: By selecting yes to the question below the MAUC number will be added into the note.  This will be calculated automatically based on the diagnosis chosen, the size entered, the body zone selected (H,M,L) and the specific indications you chose. You will also have the option to override the Mohs AUC if you disagree with the automatically calculated number and this option is found in the Case Summary tab.
Where Do You Want The Question To Include Opioid Counseling Located?: Case Summary Tab
Eye Protection Verbiage: Before proceeding with the stage, a plastic scleral shield was inserted. The globe was anesthetized with a few drops of proparacaine hydrochloride ophthalmic solution, USP 0.5%. Then, an appropriate sized scleral shield was chosen and coated with lacrilube ointment. The shield was gently inserted and left in place for the duration of each stage. After the stage was completed, the shield was gently removed.
Mohs Method Verbiage: An incision at a 90 degree angle following the standard Mohs approach was done and the specimen was harvested as a microscopic controlled layer.
Surgeon/Pathologist Verbiage (Will Incorporate Name Of Surgeon From Intro If Not Blank): operated in two distinct and integrated capacities as the surgeon and pathologist.
Mohs Histo Method Verbiage: Each section was then chromacoded and processed in the Mohs lab using the Mohs protocol and submitted for tissue processing
Subsequent Stages Histo Method Verbiage: Using a similar technique to that described above, a thin layer of tissue was removed from all areas where tumor was visible on the previous stage.  The tissue was again oriented, mapped, dyed, and processed as above.
Mohs Rapid Report Verbiage: The area of clinically evident tumor was marked with skin marking ink and appropriately hatched.  The initial incision was made following the Mohs approach through the skin.  The specimen was taken to the lab, divided into the necessary number of pieces, chromacoded and processed according to the Mohs protocol.  This was repeated in successive stages until a tumor free defect was achieved.
Complex Repair Preamble Text (Leave Blank If You Do Not Want): Extensive wide undermining was performed.
Intermediate Repair Preamble Text (Leave Blank If You Do Not Want): Undermining was performed with blunt dissection.
Graft Cartilage Fenestration Text: The cartilage was fenestrated with a 2mm punch biopsy to help facilitate graft survival and healing.
Non-Graft Cartilage Fenestration Text: The cartilage was fenestrated with a 2mm punch biopsy to help facilitate healing.
Secondary Intention Text (Leave Blank If You Do Not Want): The defect will heal with secondary intention.
No Repair - Repaired With Adjacent Surgical Defect Text (Leave Blank If You Do Not Want): After obtaining clear surgical margins the defect was repaired concurrently with another surgical defect which was in close approximation.
Unique Flap 1 Name:  Pedicled Propellar Flap
Unique Flap 1 Text: A decision was made to reconstruct the defect utilizing an  pedicled propellar flap.  The donor pedicle which included the  was injected with anesthesia.  The flap was excised through the skin and subcutaneous tissue down to the layer of the underlying musculature.  The flap was carefully excised within this deep plane to maintain its blood supply.  The edges of the donor site were undermined.   The donor site was closed in a primary fashion.  The flap was then rotated into position and sutured and the pedicle was tucked underneath the skin surface.  Once the flap was sutured into place, adequate blood supply was confirmed with blanching and refill.
Adjacent Tissue Transfer Text: The defect edges were debeveled with a #15 scalpel blade. Given the location of the defect and the proximity to free margins an adjacent tissue transfer was deemed most appropriate. Using a sterile surgical marker, an appropriate flap was drawn incorporating the defect and placing the expected incisions within the relaxed skin tension lines where possible. The area thus outlined was incised deep to adipose tissue with a #15 scalpel blade. The skin margins were undermined to an appropriate distance in all directions utilizing iris scissors.
Advancement Flap (Single) Text: The defect edges were debeveled with a #15 scalpel blade. Given the location of the defect and the proximity to free margins a single advancement flap was deemed most appropriate. Using a sterile surgical marker, an appropriate advancement flap was drawn incorporating the defect and placing the expected incisions within the relaxed skin tension lines where possible. The area thus outlined was incised deep to adipose tissue with a #15 scalpel blade. The skin margins were undermined to an appropriate distance in all directions utilizing iris scissors. Following this, the designed flap was advanced into the primary defect and sutured into place.
Advancement Flap (Double) Text: The defect edges were debeveled with a #15 scalpel blade. Given the location of the defect and the proximity to free margins a double advancement flap was deemed most appropriate. Using a sterile surgical marker, the appropriate advancement flaps were drawn incorporating the defect and placing the expected incisions within the relaxed skin tension lines where possible. The area thus outlined was incised deep to adipose tissue with a #15 scalpel blade. The skin margins were undermined to an appropriate distance in all directions utilizing iris scissors. Following this, the designed flaps were advanced into the primary defect and sutured into place.
Advancement-Rotation Flap Text: The defect edges were debeveled with a #15 scalpel blade. Given the location of the defect, shape of the defect and the proximity to free margins an advancement-rotation flap was deemed most appropriate. Using a sterile surgical marker, an appropriate flap was drawn incorporating the defect and placing the expected incisions within the relaxed skin tension lines where possible. The area thus outlined was incised deep to adipose tissue with a #15 scalpel blade. The skin margins were undermined to an appropriate distance in all directions utilizing iris scissors. Following this, the designed flap was placed into the primary defect and sutured into place.
Alar Island Pedicle Flap Text: The defect edges were debeveled with a #15 scalpel blade. Given the location of the defect, shape of the defect and the proximity to the alar rim an island pedicle advancement flap was deemed most appropriate. Using a sterile surgical marker, an appropriate advancement flap was drawn incorporating the defect, outlining the appropriate donor tissue and placing the expected incisions within the nasal ala running parallel to the alar rim. The area thus outlined was incised with a #15 scalpel blade. The skin margins were undermined minimally to an appropriate distance in all directions around the primary defect and laterally outward around the island pedicle utilizing iris scissors.  There was minimal undermining beneath the pedicle flap. Following this, the designed flap was placed in the primary defect and sutured into place.
A-T Advancement Flap Text: The defect edges were debeveled with a #15 scalpel blade. Given the location of the defect, shape of the defect and the proximity to free margins an A-T advancement flap was deemed most appropriate. Using a sterile surgical marker, an appropriate advancement flap was drawn incorporating the defect and placing the expected incisions within the relaxed skin tension lines where possible. The area thus outlined was incised deep to adipose tissue with a #15 scalpel blade. The skin margins were undermined to an appropriate distance in all directions utilizing iris scissors. Following this, the designed flap was advanced into the primary defect and sutured into place.
Banner Transposition Flap Text: The defect edges were debeveled with a #15 scalpel blade. Given the location of the defect and the proximity to free margins a Banner transposition flap was deemed most appropriate. Using a sterile surgical marker, an appropriate flap was drawn around the defect. The area thus outlined was incised deep to adipose tissue with a #15 scalpel blade. The skin margins were undermined to an appropriate distance in all directions utilizing iris scissors. Following this, the designed flap was placed in the primary defect and sutured into place.
Bilateral Helical Rim Advancement Flap Text: The defect edges were debeveled with a #15 blade scalpel.  Given the location of the defect and the proximity to free margins (helical rim) a bilateral helical rim advancement flap was deemed most appropriate. Using a sterile surgical marker, the appropriate advancement flaps were drawn incorporating the defect and placing the expected incisions between the helical rim and antihelix where possible.  The area thus outlined was incised through and through with a #15 scalpel blade.  With a skin hook and iris scissors, the flaps were gently and sharply undermined and freed up. Following this, the designed flaps were placed into the primary defect and sutured into place.
Bilateral Rotation Flap Text: The defect edges were debeveled with a #15 scalpel blade. Given the location of the defect, shape of the defect and the proximity to free margins a bilateral rotation flap was deemed most appropriate. Using a sterile surgical marker, an appropriate rotation flap was drawn incorporating the defect and placing the expected incisions within the relaxed skin tension lines where possible. The area thus outlined was incised deep to adipose tissue with a #15 scalpel blade. The skin margins were undermined to an appropriate distance in all directions utilizing iris scissors. Following this, the designed flap was carried over into the primary defect and sutured into place.
Bilobed Flap Text: The defect edges were debeveled with a #15 scalpel blade. Given the location of the defect and the proximity to free margins a bilobe flap was deemed most appropriate. Using a sterile surgical marker, an appropriate bilobe flap drawn around the defect. The area thus outlined was incised deep to adipose tissue with a #15 scalpel blade. The skin margins were undermined to an appropriate distance in all directions utilizing iris scissors.  Following this, the designed flap was placed into the primary defect and sutured into place.
Bilobed Transposition Flap Text: The defect edges were debeveled with a #15 scalpel blade. Given the location of the defect and the proximity to free margins a bilobed transposition flap was deemed most appropriate. Using a sterile surgical marker, an appropriate bilobe flap drawn around the defect. The area thus outlined was incised deep to adipose tissue with a #15 scalpel blade. The skin margins were undermined to an appropriate distance in all directions utilizing iris scissors.  Following this, the designed flap was placed into the primary defect and sutured into place.
Bi-Rhombic Flap Text: The defect edges were debeveled with a #15 scalpel blade. Given the location of the defect and the proximity to free margins a bi-rhombic flap was deemed most appropriate. Using a sterile surgical marker, an appropriate rhombic flap was drawn incorporating the defect. The area thus outlined was incised deep to adipose tissue with a #15 scalpel blade. The skin margins were undermined to an appropriate distance in all directions utilizing iris scissors. Following this, the designed flap was placed into the primary defect and sutured into place.
Burow's Advancement Flap Text: The defect edges were debeveled with a #15 scalpel blade. Given the location of the defect and the proximity to free margins a Burow's advancement flap was deemed most appropriate. Using a sterile surgical marker, the appropriate advancement flap was drawn incorporating the defect and placing the expected incisions within the relaxed skin tension lines where possible. The area thus outlined was incised deep to adipose tissue with a #15 scalpel blade. The skin margins were undermined to an appropriate distance in all directions utilizing iris scissors. Following this, the designed flap was advanced into the primary defect and sutured into place.
Chonodrocutaneous Helical Advancement Flap Text: The defect edges were debeveled with a #15 scalpel blade. Given the location of the defect and the proximity to free margins a chondrocutaneous helical advancement flap was deemed most appropriate. Using a sterile surgical marker, the appropriate advancement flap was drawn incorporating the defect and placing the expected incisions within the relaxed skin tension lines where possible. The area thus outlined was incised deep to adipose tissue with a #15 scalpel blade. The skin margins were undermined to an appropriate distance in all directions utilizing iris scissors. Following this, the designed flap was advanced into the primary defect and sutured into place.
Crescentic Advancement Flap Text: The defect edges were debeveled with a #15 scalpel blade. Given the location of the defect and the proximity to free margins a crescentic advancement flap was deemed most appropriate. Using a sterile surgical marker, the appropriate advancement flap was drawn incorporating the defect and placing the expected incisions within the relaxed skin tension lines where possible. The area thus outlined was incised deep to adipose tissue with a #15 scalpel blade. The skin margins were undermined to an appropriate distance in all directions utilizing iris scissors. Following this, the designed flap was advanced into the primary defect and sutured into place.
Dorsal Nasal Flap Text: The defect edges were debeveled with a #15 scalpel blade. Given the location of the defect and the proximity to free margins a dorsal nasal flap was deemed most appropriate. Using a sterile surgical marker, an appropriate dorsal nasal flap was drawn around the defect. The area thus outlined was incised deep to adipose tissue with a #15 scalpel blade. The skin margins were undermined to an appropriate distance in all directions utilizing iris scissors. Following this, the designed flap was placed in the primary defect and sutured into place.
Double Island Pedicle Flap Text: The defect edges were debeveled with a #15 scalpel blade. Given the location of the defect, shape of the defect and the proximity to free margins a double island pedicle advancement flap was deemed most appropriate. Using a sterile surgical marker, an appropriate advancement flap was drawn incorporating the defect, outlining the appropriate donor tissue and placing the expected incisions within the relaxed skin tension lines where possible. The area thus outlined was incised deep to adipose tissue with a #15 scalpel blade. The skin margins were undermined to an appropriate distance in all directions around the primary defect and laterally outward around the island pedicle utilizing iris scissors.  There was minimal undermining beneath the pedicle flap. Following this, the flap was placed into the primary defect and sutured into place.
Double O-Z Flap Text: The defect edges were debeveled with a #15 scalpel blade. Given the location of the defect, shape of the defect and the proximity to free margins a Double O-Z flap was deemed most appropriate. Using a sterile surgical marker, an appropriate transposition flap was drawn incorporating the defect and placing the expected incisions within the relaxed skin tension lines where possible. The area thus outlined was incised deep to adipose tissue with a #15 scalpel blade. The skin margins were undermined to an appropriate distance in all directions utilizing iris scissors. Following this, the designed flap was placed into the primary defect and sutured into place.
Double O-Z Plasty Text: The defect edges were debeveled with a #15 scalpel blade. Given the location of the defect, shape of the defect and the proximity to free margins a Double O-Z plasty (double transposition flap) was deemed most appropriate. Using a sterile surgical marker, the appropriate transposition flaps were drawn incorporating the defect and placing the expected incisions within the relaxed skin tension lines where possible. The area thus outlined was incised deep to adipose tissue with a #15 scalpel blade. The skin margins were undermined to an appropriate distance in all directions utilizing iris scissors.  Hemostasis was achieved with electrocautery.  The flaps were then transposed into place, one clockwise and the other counterclockwise, and anchored with interrupted buried subcutaneous sutures.
Double Z Plasty Text: The lesion was extirpated to the level of the fat with a #15 scalpel blade. Given the location of the defect, shape of the defect and the proximity to free margins a double Z-plasty was deemed most appropriate for repair. Using a sterile surgical marker, the appropriate transposition arms of the double Z-plasty were drawn incorporating the defect and placing the expected incisions within the relaxed skin tension lines where possible. The area thus outlined was incised deep to adipose tissue with a #15 scalpel blade. The skin margins were undermined to an appropriate distance in all directions utilizing iris scissors. The opposing transposition arms were then transposed and carried over into place in opposite direction and anchored with interrupted buried subcutaneous sutures.
Ear Star Wedge Flap Text: The defect edges were debeveled with a #15 blade scalpel.  Given the location of the defect and the proximity to free margins (helical rim) an ear star wedge flap was deemed most appropriate. Using a sterile surgical marker, the appropriate flap was drawn incorporating the defect and placing the expected incisions between the helical rim and antihelix where possible.  The area thus outlined was incised through and through with a #15 scalpel blade. Following this, the designed flap was placed in the primary defect and sutured into place.
Flip-Flop Flap Text: The defect edges were debeveled with a #15 blade scalpel.  Given the location of the defect and the proximity to free margins a flip-flop flap was deemed most appropriate. Using a sterile surgical marker, the appropriate flap was drawn incorporating the defect and placing the expected incisions between the helical rim and antihelix where possible.  The area thus outlined was incised through and through with a #15 scalpel blade. Following this, the designed flap was carried over into the primary defect and sutured into place.
Hatchet Flap Text: The defect edges were debeveled with a #15 scalpel blade. Given the location of the defect, shape of the defect and the proximity to free margins a hatchet flap was deemed most appropriate. Using a sterile surgical marker, an appropriate hatchet flap was drawn incorporating the defect and placing the expected incisions within the relaxed skin tension lines where possible. The area thus outlined was incised deep to adipose tissue with a #15 scalpel blade. The skin margins were undermined to an appropriate distance in all directions utilizing iris scissors. Following this, the designed flap was placed into the primary defect and sutured into place.
Helical Rim Advancement Flap Text: The defect edges were debeveled with a #15 blade scalpel.  Given the location of the defect and the proximity to free margins (helical rim) a double helical rim advancement flap was deemed most appropriate. Using a sterile surgical marker, the appropriate advancement flaps were drawn incorporating the defect and placing the expected incisions between the helical rim and antihelix where possible.  The area thus outlined was incised through and through with a #15 scalpel blade.  With a skin hook and iris scissors, the flaps were gently and sharply undermined and freed up. Folllowing this, the designed flaps were placed into the primary defect and sutured into place.
H Plasty Text: Given the location of the defect, shape of the defect and the proximity to free margins a H-plasty was deemed most appropriate for repair. Using a sterile surgical marker, the appropriate advancement arms of the H-plasty were drawn incorporating the defect and placing the expected incisions within the relaxed skin tension lines where possible. The area thus outlined was incised deep to adipose tissue with a #15 scalpel blade. The skin margins were undermined to an appropriate distance in all directions utilizing iris scissors.  The opposing advancement arms were then advanced into place in opposite direction and anchored with interrupted buried subcutaneous sutures.
Island Pedicle Flap Text: The defect edges were debeveled with a #15 scalpel blade. Given the location of the defect, shape of the defect and the proximity to free margins an island pedicle advancement flap was deemed most appropriate. Using a sterile surgical marker, an appropriate advancement flap was drawn incorporating the defect, outlining the appropriate donor tissue and placing the expected incisions within the relaxed skin tension lines where possible. The area thus outlined was incised deep to adipose tissue with a #15 scalpel blade. The skin margins were undermined to an appropriate distance in all directions around the primary defect and laterally outward around the island pedicle utilizing iris scissors.  There was minimal undermining beneath the pedicle flap. Following this, the flap was placed into the primary defect and sutured into place.
Island Pedicle Flap With Canthal Suspension Text: The defect edges were debeveled with a #15 scalpel blade. Given the location of the defect, shape of the defect and the proximity to free margins an island pedicle advancement flap was deemed most appropriate. Using a sterile surgical marker, an appropriate advancement flap was drawn incorporating the defect, outlining the appropriate donor tissue and placing the expected incisions within the relaxed skin tension lines where possible. The area thus outlined was incised deep to adipose tissue with a #15 scalpel blade. The skin margins were undermined to an appropriate distance in all directions around the primary defect and laterally outward around the island pedicle utilizing iris scissors.  There was minimal undermining beneath the pedicle flap. A suspension suture was placed in the canthal tendon to prevent tension and prevent ectropion. Following this, the designed flap was placed into the primary defect and sutured into place.
Island Pedicle Flap-Requiring Vessel Identification Text: The defect edges were debeveled with a #15 scalpel blade. Given the location of the defect, shape of the defect and the proximity to free margins an island pedicle advancement flap was deemed most appropriate. Using a sterile surgical marker, an appropriate advancement flap was drawn, based on the axial vessel mentioned above, incorporating the defect, outlining the appropriate donor tissue and placing the expected incisions within the relaxed skin tension lines where possible. The area thus outlined was incised deep to adipose tissue with a #15 scalpel blade. The skin margins were undermined to an appropriate distance in all directions around the primary defect and laterally outward around the island pedicle utilizing iris scissors.  There was minimal undermining beneath the pedicle flap. Following this, the designed flap was placed in the primary defect and sutured into place.
Keystone Flap Text: The defect edges were debeveled with a #15 scalpel blade. Given the location of the defect, shape of the defect a keystone flap was deemed most appropriate. Using a sterile surgical marker, an appropriate keystone flap was drawn incorporating the defect, outlining the appropriate donor tissue and placing the expected incisions within the relaxed skin tension lines where possible. The area thus outlined was incised deep to adipose tissue with a #15 scalpel blade. The skin margins were undermined to an appropriate distance in all directions around the primary defect and laterally outward around the flap utilizing iris scissors. Following this, the designed flap was placed in the primary defect and sutured into place.
Melolabial Transposition Flap Text: The defect edges were debeveled with a #15 scalpel blade. Given the location of the defect and the proximity to free margins a melolabial flap was deemed most appropriate. Using a sterile surgical marker, an appropriate melolabial transposition flap was drawn incorporating the defect. The area thus outlined was incised deep to adipose tissue with a #15 scalpel blade. The skin margins were undermined to an appropriate distance in all directions utilizing iris scissors. Following this, the designed flap was placed into the primary defect and sutured into place.
Mercedes Flap Text: The defect edges were debeveled with a #15 scalpel blade. Given the location of the defect, shape of the defect and the proximity to free margins a Mercedes flap was deemed most appropriate. Using a sterile surgical marker, an appropriate advancement flap was drawn incorporating the defect and placing the expected incisions within the relaxed skin tension lines where possible. The area thus outlined was incised deep to adipose tissue with a #15 scalpel blade. The skin margins were undermined to an appropriate distance in all directions utilizing iris scissors. Following this, the designed flap was advanced into the primary defect and sutured into place.
Modified Advancement Flap Text: The defect edges were debeveled with a #15 scalpel blade. Given the location of the defect, shape of the defect and the proximity to free margins a modified advancement flap was deemed most appropriate. Using a sterile surgical marker, an appropriate advancement flap was drawn incorporating the defect and placing the expected incisions within the relaxed skin tension lines where possible. The area thus outlined was incised deep to adipose tissue with a #15 scalpel blade. The skin margins were undermined to an appropriate distance in all directions utilizing iris scissors. Following this, the designed flap was advanced into the primary defect and sutured into place.
Mucosal Advancement Flap Text: Given the location of the defect, shape of the defect and the proximity to free margins a mucosal advancement flap was deemed most appropriate. Incisions were made with a 15 blade scalpel in the appropriate fashion along the cutaneous vermilion border and the mucosal lip. The remaining actinically damaged mucosal tissue was excised.  The mucosal advancement flap was then elevated to the gingival sulcus with care taken to preserve the neurovascular structures and advanced into the primary defect. Care was taken to ensure that precise realignment of the vermilion border was achieved.
Muscle Hinge Flap Text: The defect edges were debeveled with a #15 scalpel blade.  Given the size, depth and location of the defect and the proximity to free margins a muscle hinge flap was deemed most appropriate. Using a sterile surgical marker, an appropriate hinge flap was drawn incorporating the defect. The area thus outlined was incised with a #15 scalpel blade. The skin margins were undermined to an appropriate distance in all directions utilizing iris scissors. Following this, the designed flap was placed in the primary defect and sutured into place.
Mustarde Flap Text: The defect edges were debeveled with a #15 scalpel blade.  Given the size, depth and location of the defect and the proximity to free margins a Mustarde flap was deemed most appropriate. Using a sterile surgical marker, an appropriate flap was drawn incorporating the defect. The area thus outlined was incised with a #15 scalpel blade. The skin margins were undermined to an appropriate distance in all directions utilizing iris scissors. Following this, the designed flap was placed in the primary defect and sutured into place.
Nasal Turnover Hinge Flap Text: The defect edges were debeveled with a #15 scalpel blade.  Given the size, depth, location of the defect and the defect being full thickness a nasal turnover hinge flap was deemed most appropriate. Using a sterile surgical marker, an appropriate hinge flap was drawn incorporating the defect. The area thus outlined was incised with a #15 scalpel blade. The flap was designed to recreate the nasal mucosal lining and the alar rim. The skin margins were undermined to an appropriate distance in all directions utilizing iris scissors. Following this, the designed flap was placed into the primary defect and sutured into place
Nasalis-Muscle-Based Myocutaneous Island Pedicle Flap Text: Using a #15 blade, an incision was made around the donor flap to the level of the nasalis muscle. Wide lateral undermining was then performed in both the subcutaneous plane above the nasalis muscle, and in a submuscular plane just above periosteum. This allowed the formation of a free nasalis muscle axial pedicle (based on the angular artery) which was still attached to the actual cutaneous flap, increasing its mobility and vascular viability. Hemostasis was obtained with pinpoint electrocoagulation. The flap was mobilized into position and the pivotal anchor points positioned and stabilized with buried interrupted sutures. Subcutaneous and dermal tissues were closed in a multilayered fashion with sutures. Tissue redundancies were excised, and the epidermal edges were apposed without significant tension and sutured with sutures.
Nasalis Myocutaneous Flap Text: Using a #15 blade, an incision was made around the donor flap to the level of the nasalis muscle. Wide lateral undermining was then performed in both the subcutaneous plane above the nasalis muscle, and in a submuscular plane just above periosteum. This allowed the formation of a free nasalis muscle axial pedicle which was still attached to the actual cutaneous flap, increasing its mobility and vascular viability. Hemostasis was obtained with pinpoint electrocoagulation. The flap was mobilized into position and the pivotal anchor points positioned and stabilized with buried interrupted sutures. Subcutaneous and dermal tissues were closed in a multilayered fashion with sutures. Tissue redundancies were excised, and the epidermal edges were apposed without significant tension and sutured with sutures.
Nasolabial Transposition Flap Text: The defect edges were debeveled with a #15 scalpel blade.  Given the size, depth and location of the defect and the proximity to free margins a nasolabial transposition flap was deemed most appropriate. Using a sterile surgical marker, an appropriate flap was drawn incorporating the defect. The area thus outlined was incised with a #15 scalpel blade. The skin margins were undermined to an appropriate distance in all directions utilizing iris scissors. Following this, the designed flap was carried into the primary defect and sutured into place.
Orbicularis Oris Muscle Flap Text: The defect edges were debeveled with a #15 scalpel blade.  Given that the defect affected the competency of the oral sphincter an orbicularis oris muscle flap was deemed most appropriate to restore this competency and normal muscle function.  Using a sterile surgical marker, an appropriate flap was drawn incorporating the defect. The area thus outlined was incised with a #15 scalpel blade. Following this, the designed flap was placed into the primary defect and sutured into place.
O-T Advancement Flap Text: The defect edges were debeveled with a #15 scalpel blade. Given the location of the defect, shape of the defect and the proximity to free margins an O-T advancement flap was deemed most appropriate. Using a sterile surgical marker, an appropriate advancement flap was drawn incorporating the defect and placing the expected incisions within the relaxed skin tension lines where possible. The area thus outlined was incised deep to adipose tissue with a #15 scalpel blade. The skin margins were undermined to an appropriate distance in all directions utilizing iris scissors. Following this, the designed flap was advanced into the primary defect and sutured into place.
O-T Plasty Text: The defect edges were debeveled with a #15 scalpel blade. Given the location of the defect, shape of the defect and the proximity to free margins an O-T plasty was deemed most appropriate. Using a sterile surgical marker, an appropriate O-T plasty was drawn incorporating the defect and placing the expected incisions within the relaxed skin tension lines where possible. The area thus outlined was incised deep to adipose tissue with a #15 scalpel blade. The skin margins were undermined to an appropriate distance in all directions utilizing iris scissors. Following this, the designed flap was placed into the primary defect and sutured into place.
O-L Flap Text: The defect edges were debeveled with a #15 scalpel blade. Given the location of the defect, shape of the defect and the proximity to free margins an O-L flap was deemed most appropriate. Using a sterile surgical marker, an appropriate advancement flap was drawn incorporating the defect and placing the expected incisions within the relaxed skin tension lines where possible. The area thus outlined was incised deep to adipose tissue with a #15 scalpel blade. The skin margins were undermined to an appropriate distance in all directions utilizing iris scissors. Following this, the designed flap was advanced into the primary defect and sutured into place.
O-Z Flap Text: The defect edges were debeveled with a #15 scalpel blade. Given the location of the defect, shape of the defect and the proximity to free margins an O-Z flap was deemed most appropriate. Using a sterile surgical marker, an appropriate transposition flap was drawn incorporating the defect and placing the expected incisions within the relaxed skin tension lines where possible. The area thus outlined was incised deep to adipose tissue with a #15 scalpel blade. The skin margins were undermined to an appropriate distance in all directions utilizing iris scissors. Following this, the designed flap was placed into the primary defect and sutured into place.
O-Z Plasty Text: The defect edges were debeveled with a #15 scalpel blade. Given the location of the defect, shape of the defect and the proximity to free margins an O-Z plasty (double transposition flap) was deemed most appropriate. Using a sterile surgical marker, the appropriate transposition flaps were drawn incorporating the defect and placing the expected incisions within the relaxed skin tension lines where possible. The area thus outlined was incised deep to adipose tissue with a #15 scalpel blade. The skin margins were undermined to an appropriate distance in all directions utilizing iris scissors.  Hemostasis was achieved with electrocautery.  The flaps were then transposed into place, one clockwise and the other counterclockwise, and anchored with interrupted buried subcutaneous sutures.
Peng Advancement Flap Text: The defect edges were debeveled with a #15 scalpel blade. Given the location of the defect, shape of the defect and the proximity to free margins a Peng advancement flap was deemed most appropriate. Using a sterile surgical marker, an appropriate advancement flap was drawn incorporating the defect and placing the expected incisions within the relaxed skin tension lines where possible. The area thus outlined was incised deep to adipose tissue with a #15 scalpel blade. The skin margins were undermined to an appropriate distance in all directions utilizing iris scissors. Following this, the designed flap was advanced into the primary defect and sutured into place.
Rectangular Flap Text: The defect edges were debeveled with a #15 scalpel blade. Given the location of the defect and the proximity to free margins a rectangular flap was deemed most appropriate. Using a sterile surgical marker, an appropriate rectangular flap was drawn incorporating the defect. The area thus outlined was incised deep to adipose tissue with a #15 scalpel blade. The skin margins were undermined to an appropriate distance in all directions utilizing iris scissors. Following this, the designed flap was carried over into the primary defect and sutured into place.
Rhombic Flap Text: The defect edges were debeveled with a #15 scalpel blade. Given the location of the defect and the proximity to free margins a rhombic flap was deemed most appropriate. Using a sterile surgical marker, an appropriate rhombic flap was drawn incorporating the defect. The area thus outlined was incised deep to adipose tissue with a #15 scalpel blade. The skin margins were undermined to an appropriate distance in all directions utilizing iris scissors. Following this, the designed flap was placed into the primary defect and sutured into place.
Rhomboid Transposition Flap Text: The defect edges were debeveled with a #15 scalpel blade. Given the location of the defect and the proximity to free margins a rhomboid transposition flap was deemed most appropriate. Using a sterile surgical marker, an appropriate rhomboid flap was drawn incorporating the defect. The area thus outlined was incised deep to adipose tissue with a #15 scalpel blade. The skin margins were undermined to an appropriate distance in all directions utilizing iris scissors. Following this, the designed flap was placed into the primary defect and sutured into place.
Rotation Flap Text: The defect edges were debeveled with a #15 scalpel blade. Given the location of the defect, shape of the defect and the proximity to free margins a rotation flap was deemed most appropriate. Using a sterile surgical marker, an appropriate rotation flap was drawn incorporating the defect and placing the expected incisions within the relaxed skin tension lines where possible. The area thus outlined was incised deep to adipose tissue with a #15 scalpel blade. The skin margins were undermined to an appropriate distance in all directions utilizing iris scissors. Following this, the designed flap was placed into the primary defect and sutured into place.
Spiral Flap Text: The defect edges were debeveled with a #15 scalpel blade. Given the location of the defect, shape of the defect and the proximity to free margins a spiral flap was deemed most appropriate. Using a sterile surgical marker, an appropriate rotation flap was drawn incorporating the defect and placing the expected incisions within the relaxed skin tension lines where possible. The area thus outlined was incised deep to adipose tissue with a #15 scalpel blade. The skin margins were undermined to an appropriate distance in all directions utilizing iris scissors. Following this, the designed flap was placed into the primary defect and sutured into place.
Staged Advancement Flap Text: The defect edges were debeveled with a #15 scalpel blade. Given the location of the defect, shape of the defect and the proximity to free margins a staged advancement flap was deemed most appropriate. Using a sterile surgical marker, an appropriate advancement flap was drawn incorporating the defect and placing the expected incisions within the relaxed skin tension lines where possible. The area thus outlined was incised deep to adipose tissue with a #15 scalpel blade. The skin margins were undermined to an appropriate distance in all directions utilizing iris scissors. Following this, the designed flap was placed into the primary defect and sutured into place.
Star Wedge Flap Text: The defect edges were debeveled with a #15 scalpel blade. Given the location of the defect, shape of the defect and the proximity to free margins a star wedge flap was deemed most appropriate. Using a sterile surgical marker, an appropriate rotation flap was drawn incorporating the defect and placing the expected incisions within the relaxed skin tension lines where possible. The area thus outlined was incised deep to adipose tissue with a #15 scalpel blade. The skin margins were undermined to an appropriate distance in all directions utilizing iris scissors. Following this, the designed flap was placed into the primary defect and sutured into place.
Transposition Flap Text: The defect edges were debeveled with a #15 scalpel blade. Given the location of the defect and the proximity to free margins a transposition flap was deemed most appropriate. Using a sterile surgical marker, an appropriate transposition flap was drawn incorporating the defect. The area thus outlined was incised deep to adipose tissue with a #15 scalpel blade. The skin margins were undermined to an appropriate distance in all directions utilizing iris scissors. Following this, the designed flap was placed into the primary defect and sutured into place.
Trilobed Flap Text: The defect edges were debeveled with a #15 scalpel blade. Given the location of the defect and the proximity to free margins a trilobed flap was deemed most appropriate. Using a sterile surgical marker, an appropriate trilobed flap was drawn around the defect. The area thus outlined was incised deep to adipose tissue with a #15 scalpel blade. The skin margins were undermined to an appropriate distance in all directions utilizing iris scissors. Following this, the designed flap was placed in the primary defect and sutured into place.
V-Y Flap Text: The defect edges were debeveled with a #15 scalpel blade. Given the location of the defect, shape of the defect and the proximity to free margins a V-Y flap was deemed most appropriate. Using a sterile surgical marker, an appropriate advancement flap was drawn incorporating the defect and placing the expected incisions within the relaxed skin tension lines where possible. The area thus outlined was incised deep to adipose tissue with a #15 scalpel blade. The skin margins were undermined to an appropriate distance in all directions utilizing iris scissors. Following this, the designed flap was advanced into the primary defect and sutured into place.
V-Y Plasty Text: The defect edges were debeveled with a #15 scalpel blade. Given the location of the defect, shape of the defect and the proximity to free margins an V-Y advancement flap was deemed most appropriate. Using a sterile surgical marker, an appropriate advancement flap was drawn incorporating the defect and placing the expected incisions within the relaxed skin tension lines where possible. The area thus outlined was incised deep to adipose tissue with a #15 scalpel blade. The skin margins were undermined to an appropriate distance in all directions utilizing iris scissors. Following this, the designed flap was advanced into the primary defect and sutured into place.
W Plasty Text: The lesion was extirpated to the level of the fat with a #15 scalpel blade. Given the location of the defect, shape of the defect and the proximity to free margins a W-plasty was deemed most appropriate for repair. Using a sterile surgical marker, the appropriate transposition arms of the W-plasty were drawn incorporating the defect and placing the expected incisions within the relaxed skin tension lines where possible. The area thus outlined was incised deep to adipose tissue with a #15 scalpel blade. The skin margins were undermined to an appropriate distance in all directions utilizing iris scissors.  The opposing transposition arms were then transposed into place in opposite direction and anchored with interrupted buried subcutaneous sutures.
Z Plasty Text: The lesion was extirpated to the level of the fat with a #15 scalpel blade. Given the location of the defect, shape of the defect and the proximity to free margins a Z-plasty was deemed most appropriate for repair. Using a sterile surgical marker, the appropriate transposition arms of the Z-plasty were drawn incorporating the defect and placing the expected incisions within the relaxed skin tension lines where possible. The area thus outlined was incised deep to adipose tissue with a #15 scalpel blade. The skin margins were undermined to an appropriate distance in all directions utilizing iris scissors.  The opposing transposition arms were then transposed into place in opposite direction and anchored with interrupted buried subcutaneous sutures.
Zygomaticofacial Flap Text: Given the location of the defect, shape of the defect and the proximity to free margins a zygomaticofacial flap was deemed most appropriate for repair. Using a sterile surgical marker, the appropriate flap was drawn incorporating the defect and placing the expected incisions within the relaxed skin tension lines where possible. The area thus outlined was incised deep to adipose tissue with a #15 scalpel blade with preservation of a vascular pedicle.  The skin margins were undermined to an appropriate distance in all directions utilizing iris scissors.  The flap was then placed into the defect and anchored with interrupted buried subcutaneous sutures.
Abbe Flap (Lower To Upper Lip) Text: The defect of the upper lip was assessed and measured.  Given the location and size of the defect, an Abbe flap was deemed most appropriate. Using a sterile surgical marker, an appropriate Abbe flap was measured and drawn on the lower lip. Local anesthesia was then infiltrated. A scalpel was then used to incise the upper lip through and through the skin, vermilion, muscle and mucosa, leaving the flap pedicled on the opposite side.  The flap was then rotated and transferred to the lower lip defect.  The flap was then sutured into place with a three layer technique, closing the orbicularis oris muscle layer with subcutaneous buried sutures, followed by a mucosal layer and an epidermal layer.
Abbe Flap (Upper To Lower Lip) Text: The defect of the lower lip was assessed and measured.  Given the location and size of the defect, an Abbe flap was deemed most appropriate. Using a sterile surgical marker, an appropriate Abbe flap was measured and drawn on the upper lip. Local anesthesia was then infiltrated.  A scalpel was then used to incise the upper lip through and through the skin, vermilion, muscle and mucosa, leaving the flap pedicled on the opposite side.  The flap was then rotated and transferred to the lower lip defect.  The flap was then sutured into place with a three layer technique, closing the orbicularis oris muscle layer with subcutaneous buried sutures, followed by a mucosal layer and an epidermal layer.
Cheek Interpolation Flap Text: A decision was made to reconstruct the defect utilizing an interpolation axial flap and a staged reconstruction.  A telfa template was made of the defect.  This telfa template was then used to outline the Cheek Interpolation flap.  The donor area for the pedicle flap was then injected with anesthesia.  The flap was excised through the skin and subcutaneous tissue down to the layer of the underlying musculature.  The interpolation flap was carefully excised within this deep plane to maintain its blood supply.  The edges of the donor site were undermined.   The donor site was closed in a primary fashion.  The pedicle was then rotated into position and sutured.  Once the tube was sutured into place, adequate blood supply was confirmed with blanching and refill.  The pedicle was then wrapped with xeroform gauze and dressed appropriately with a telfa and gauze bandage to ensure continued blood supply and protect the attached pedicle.
Cheek-To-Nose Interpolation Flap Text: A decision was made to reconstruct the defect utilizing an interpolation axial flap and a staged reconstruction.  A telfa template was made of the defect.  This telfa template was then used to outline the Cheek-To-Nose Interpolation flap.  The donor area for the pedicle flap was then injected with anesthesia.  The flap was excised through the skin and subcutaneous tissue down to the layer of the underlying musculature.  The interpolation flap was carefully excised within this deep plane to maintain its blood supply.  The edges of the donor site were undermined.   The donor site was closed in a primary fashion.  The pedicle was then rotated into position and sutured.  Once the tube was sutured into place, adequate blood supply was confirmed with blanching and refill.  The pedicle was then wrapped with xeroform gauze and dressed appropriately with a telfa and gauze bandage to ensure continued blood supply and protect the attached pedicle.
Estlander Flap (Lower To Upper Lip) Text: The defect of the lower lip was assessed and measured.  Given the location and size of the defect, an Estlander flap was deemed most appropriate. Using a sterile surgical marker, an appropriate Estlander flap was measured and drawn on the upper lip. Local anesthesia was then infiltrated. A scalpel was then used to incise the lateral aspect of the flap, through skin, muscle and mucosa, leaving the flap pedicled medially.  The flap was then rotated and positioned to fill the lower lip defect.  The flap was then sutured into place with a three layer technique, closing the orbicularis oris muscle layer with subcutaneous buried sutures, followed by a mucosal layer and an epidermal layer.
Interpolation Flap Text: A decision was made to reconstruct the defect utilizing an interpolation axial flap and a staged reconstruction.  A telfa template was made of the defect.  This telfa template was then used to outline the interpolation flap.  The donor area for the pedicle flap was then injected with anesthesia.  The flap was excised through the skin and subcutaneous tissue down to the layer of the underlying musculature.  The interpolation flap was carefully excised within this deep plane to maintain its blood supply.  The edges of the donor site were undermined.   The donor site was closed in a primary fashion.  The pedicle was then rotated into position and sutured.  Once the tube was sutured into place, adequate blood supply was confirmed with blanching and refill.  The pedicle was then wrapped with xeroform gauze and dressed appropriately with a telfa and gauze bandage to ensure continued blood supply and protect the attached pedicle.
Melolabial Interpolation Flap Text: A decision was made to reconstruct the defect utilizing an interpolation axial flap and a staged reconstruction.  A telfa template was made of the defect.  This telfa template was then used to outline the melolabial interpolation flap.  The donor area for the pedicle flap was then injected with anesthesia.  The flap was excised through the skin and subcutaneous tissue down to the layer of the underlying musculature.  The pedicle flap was carefully excised within this deep plane to maintain its blood supply.  The edges of the donor site were undermined.   The donor site was closed in a primary fashion.  The pedicle was then rotated into position and sutured.  Once the tube was sutured into place, adequate blood supply was confirmed with blanching and refill.  The pedicle was then wrapped with xeroform gauze and dressed appropriately with a telfa and gauze bandage to ensure continued blood supply and protect the attached pedicle.
Mastoid Interpolation Flap Text: A decision was made to reconstruct the defect utilizing an interpolation axial flap and a staged reconstruction.  A telfa template was made of the defect.  This telfa template was then used to outline the mastoid interpolation flap.  The donor area for the pedicle flap was then injected with anesthesia.  The flap was excised through the skin and subcutaneous tissue down to the layer of the underlying musculature.  The pedicle flap was carefully excised within this deep plane to maintain its blood supply.  The edges of the donor site were undermined.   The donor site was closed in a primary fashion.  The pedicle was then rotated into position and sutured.  Once the tube was sutured into place, adequate blood supply was confirmed with blanching and refill.  The pedicle was then wrapped with xeroform gauze and dressed appropriately with a telfa and gauze bandage to ensure continued blood supply and protect the attached pedicle.
Paramedian Forehead Flap Text: A decision was made to reconstruct the defect utilizing an interpolation axial flap and a staged reconstruction.  A telfa template was made of the defect.  This telfa template was then used to outline the paramedian forehead pedicle flap.  The donor area for the pedicle flap was then injected with anesthesia.  The flap was excised through the skin and subcutaneous tissue down to the layer of the underlying musculature.  The pedicle flap was carefully excised within this deep plane to maintain its blood supply.  The edges of the donor site were undermined.   The donor site was closed in a primary fashion.  The pedicle was then rotated into position and sutured.  Once the tube was sutured into place, adequate blood supply was confirmed with blanching and refill.  The pedicle was then wrapped with xeroform gauze and dressed appropriately with a telfa and gauze bandage to ensure continued blood supply and protect the attached pedicle.
Posterior Auricular Interpolation Flap Text: A decision was made to reconstruct the defect utilizing an interpolation axial flap and a staged reconstruction.  A telfa template was made of the defect.  This telfa template was then used to outline the posterior auricular interpolation flap.  The donor area for the pedicle flap was then injected with anesthesia.  The flap was excised through the skin and subcutaneous tissue down to the layer of the underlying musculature.  The pedicle flap was carefully excised within this deep plane to maintain its blood supply.  The edges of the donor site were undermined.   The donor site was closed in a primary fashion.  The pedicle was then rotated into position and sutured.  Once the tube was sutured into place, adequate blood supply was confirmed with blanching and refill.  The pedicle was then wrapped with xeroform gauze and dressed appropriately with a telfa and gauze bandage to ensure continued blood supply and protect the attached pedicle.
Cheiloplasty (Complex) Text: A decision was made to reconstruct the defect with a  cheiloplasty.  The defect was undermined extensively.  Additional orbicularis oris muscle was excised with a 15 blade scalpel.  The defect was converted into a full thickness wedge to facilite a better cosmetic result.  Small vessels were then tied off with 5-0 monocyrl. The orbicularis oris, superficial fascia, adipose and dermis were then reapproximated.  After the deeper layers were approximated the epidermis was reapproximated with particular care given to realign the vermilion border.
Cheiloplasty (Less Than 50%) Text: A decision was made to reconstruct the defect with a  cheiloplasty.  The defect was undermined extensively.  Additional orbicularis oris muscle was excised with a 15 blade scalpel.  The defect was converted into a full thickness wedge, of less than 50% of the vertical height of the lip, to facilite a better cosmetic result.  Small vessels were then tied off with 5-0 monocyrl. The orbicularis oris, superficial fascia, adipose and dermis were then reapproximated.  After the deeper layers were approximated the epidermis was reapproximated with particular care given to realign the vermilion border.
Ear Wedge Repair Text: A wedge excision was completed by carrying down an excision through the full thickness of the ear and cartilage with an inward facing Burow's triangle. The wound was then closed in a layered fashion.
Full Thickness Lip Wedge Repair (Flap) Text: Given the location of the defect and the proximity to free margins a full thickness wedge repair was deemed most appropriate. Using a sterile surgical marker, the appropriate repair was drawn incorporating the defect and placing the expected incisions perpendicular to the vermilion border.  The vermilion border was also meticulously outlined to ensure appropriate reapproximation during the repair.  The area thus outlined was incised through and through with a #15 scalpel blade.  The muscularis and dermis were reaproximated with deep sutures following hemostasis. Care was taken to realign the vermilion border before proceeding with the superficial closure.  Once the vermilion was realigned the superfical and mucosal closure was finished.
Burow's Graft Text: The defect edges were debeveled with a #15 scalpel blade. Given the location of the defect, shape of the defect, the proximity to free margins and the presence of a standing cone deformity a Burow's skin graft was deemed most appropriate. The standing cone was removed and this tissue was then trimmed to the shape of the primary defect. The adipose tissue was also removed until only dermis and epidermis were left.  The skin margins of the secondary defect were undermined to an appropriate distance in all directions utilizing iris scissors.  The secondary defect was closed with interrupted buried subcutaneous sutures.  The skin edges were then re-apposed with running  sutures.  The skin graft was then placed in the primary defect and oriented appropriately.
Cartilage Graft Text: The defect edges were debeveled with a #15 scalpel blade. Given the location of the defect, shape of the defect, the fact the defect involved a full thickness cartilage defect a cartilage graft was deemed most appropriate.  An appropriate donor site was identified, cleansed, and anesthetized. The cartilage graft was then harvested and transferred to the recipient site, oriented appropriately and then sutured into place.  The secondary defect was then repaired using a primary closure.
Composite Graft Text: The defect edges were debeveled with a #15 scalpel blade. Given the location of the defect, shape of the defect, the proximity to free margins and the fact the defect was full thickness a composite graft was deemed most appropriate.  The defect was outline and then transferred to the donor site.  A full thickness graft was then excised from the donor site. The graft was then placed in the primary defect, oriented appropriately and then sutured into place.  The secondary defect was then repaired using a primary closure.
Epidermal Autograft Text: The defect edges were debeveled with a #15 scalpel blade. Given the location of the defect, shape of the defect and the proximity to free margins an epidermal autograft was deemed most appropriate. Using a sterile surgical marker, the primary defect shape was transferred to the donor site. The epidermal graft was then harvested.  The skin graft was then placed in the primary defect and oriented appropriately.
Dermal Autograft Text: The defect edges were debeveled with a #15 scalpel blade. Given the location of the defect, shape of the defect and the proximity to free margins a dermal autograft was deemed most appropriate. Using a sterile surgical marker, the primary defect shape was transferred to the donor site. The area thus outlined was incised deep to adipose tissue with a #15 scalpel blade.  The harvested graft was then trimmed of adipose and epidermal tissue until only dermis was left.  The skin graft was then placed in the primary defect and oriented appropriately.
Ftsg Text: The defect edges were debeveled with a #15 scalpel blade. Given the location of the defect, shape of the defect and the proximity to free margins a full thickness skin graft was deemed most appropriate. Using a sterile surgical marker, the primary defect shape was transferred to the donor site. The area thus outlined was incised deep to adipose tissue with a #15 scalpel blade.  The harvested graft was then trimmed of adipose tissue until only dermis and epidermis was left.  The skin margins of the secondary defect were undermined to an appropriate distance in all directions utilizing iris scissors.  The secondary defect was closed with interrupted buried subcutaneous sutures.  The skin edges were then re-apposed with running  sutures.  The skin graft was then placed in the primary defect and oriented appropriately.
Pinch Graft Text: The defect edges were debeveled with a #15 scalpel blade. Given the location of the defect, shape of the defect and the proximity to free margins a pinch graft was deemed most appropriate. Using a sterile surgical marker, the primary defect shape was transferred to the donor site. The area thus outlined was incised deep to adipose tissue with a #15 scalpel blade.  The harvested graft was then trimmed of adipose tissue until only dermis and epidermis was left. The skin margins of the secondary defect were undermined to an appropriate distance in all directions utilizing iris scissors.  The secondary defect was closed with interrupted buried subcutaneous sutures.  The skin edges were then re-apposed with running  sutures.  The skin graft was then placed in the primary defect and oriented appropriately.
Skin Substitute Text: The defect edges were debeveled with a #15 scalpel blade. Given the location of the defect, shape of the defect and the proximity to free margins a skin substitute graft was deemed most appropriate.  The graft material was trimmed to fit the size of the defect. The graft was then placed in the primary defect and oriented appropriately.
Split-Thickness Skin Graft Text: The defect edges were debeveled with a #15 scalpel blade. Given the location of the defect, shape of the defect and the proximity to free margins a split thickness skin graft was deemed most appropriate. Using a sterile surgical marker, the primary defect shape was transferred to the donor site. The split thickness graft was then harvested.  The skin graft was then placed in the primary defect and oriented appropriately.
Tissue Cultured Epidermal Autograft Text: The defect edges were debeveled with a #15 scalpel blade. Given the location of the defect, shape of the defect and the proximity to free margins a tissue cultured epidermal autograft was deemed most appropriate.  The graft was then trimmed to fit the size of the defect.  The graft was then placed in the primary defect and oriented appropriately.
Xenograft Text: The defect edges were debeveled with a #15 scalpel blade. Given the location of the defect, shape of the defect and the proximity to free margins a xenograft was deemed most appropriate.  The graft was then trimmed to fit the size of the defect.  The graft was then placed in the primary defect and oriented appropriately.
Complex Repair And Flap Additional Text (Will Appearing After The Standard Complex Repair Text): The complex repair was not sufficient to completely close the primary defect. The remaining additional defect was repaired with the flap mentioned below.
Complex Repair And Graft Additional Text (Will Appearing After The Standard Complex Repair Text): The complex repair was not sufficient to completely close the primary defect. The remaining additional defect was repaired with the graft mentioned below.
Eyelid Full Thickness Repair - 02046: The eyelid defect was full thickness which required a wedge repair of the eyelid. Special care was taken to ensure that the eyelid margin was realligned when placing sutures.
Eyelid Partial Thickness Repair - 19318: The eyelid defect was partial thickness which required a wedge repair of the eyelid. Special care was taken to ensure that the eyelid margin was realligned when placing sutures.
Intermediate Repair And Flap Additional Text (Will Appearing After The Standard Complex Repair Text): The intermediate repair was not sufficient to completely close the primary defect. The remaining additional defect was repaired with the flap mentioned below.
Intermediate Repair And Graft Additional Text (Will Appearing After The Standard Complex Repair Text): The intermediate repair was not sufficient to completely close the primary defect. The remaining additional defect was repaired with the graft mentioned below.
Localized Dermabrasion With 15 Blade Text: The patient was draped in routine manner.  Localized dermabrasion using a 15 blade was performed in routine manner to papillary dermis. This spot dermabrasion is being performed to complete skin cancer reconstruction. It also will eliminate the other sun damaged precancerous cells that are known to be part of the regional effect of a lifetime's worth of sun exposure. This localized dermabrasion is therapeutic and should not be considered cosmetic in any regard.
Localized Dermabrasion With Sand Papertext: The patient was draped in routine manner.  Localized dermabrasion using sterile sand paper was performed in routine manner to papillary dermis. This spot dermabrasion is being performed to complete skin cancer reconstruction. It also will eliminate the other sun damaged precancerous cells that are known to be part of the regional effect of a lifetime's worth of sun exposure. This localized dermabrasion is therapeutic and should not be considered cosmetic in any regard.
Localized Dermabrasion With Wire Brush Text: The patient was draped in routine manner.  Localized dermabrasion using 3 x 17 mm wire brush was performed in routine manner to papillary dermis. This spot dermabrasion is being performed to complete skin cancer reconstruction. It also will eliminate the other sun damaged precancerous cells that are known to be part of the regional effect of a lifetime's worth of sun exposure. This localized dermabrasion is therapeutic and should not be considered cosmetic in any regard.
Purse String (Simple) Text: Given the location of the defect and the characteristics of the surrounding skin a purse string closure was deemed most appropriate.  Undermining was performed circumferentially around the surgical defect.  A purse string suture was then placed and tightened.
Purse String (Intermediate) Text: Given the location of the defect and the characteristics of the surrounding skin a purse string intermediate closure was deemed most appropriate.  Undermining was performed circumferentially around the surgical defect.  A purse string suture was then placed and tightened.
Partial Purse String (Simple) Text: Given the location of the defect and the characteristics of the surrounding skin a simple purse string closure was deemed most appropriate.  Undermining was performed circumferentially around the surgical defect.  A purse string suture was then placed and tightened. Wound tension only allowed a partial closure of the circular defect.
Partial Purse String (Intermediate) Text: Given the location of the defect and the characteristics of the surrounding skin an intermediate purse string closure was deemed most appropriate.  Undermining was performed circumferentially around the surgical defect.  A purse string suture was then placed and tightened. Wound tension only allowed a partial closure of the circular defect.
Tarsorrhaphy Text: A tarsorrhaphy was performed using Frost sutures.
Manual Repair Warning Statement: We plan on removing the manually selected variable below in favor of our much easier automatic structured text blocks found in the previous tab. We decided to do this to help make the flow better and give you the full power of structured data. Manual selection is never going to be ideal in our platform and I would encourage you to avoid using manual selection from this point on, especially since I will be sunsetting this feature. It is important that you do one of two things with the customized text below. First, you can save all of the text in a word file so you can have it for future reference. Second, transfer the text to the appropriate area in the Library tab. Lastly, if there is a flap or graft type which we do not have you need to let us know right away so I can add it in before the variable is hidden. No need to panic, we plan to give you roughly 6 months to make the change.
Same Histology In Subsequent Stages Text: The pattern and morphology of the tumor is as described in the first stage.
No Residual Tumor Seen Histology Text: There were no malignant cells seen in the sections examined.
Inflammation Suggestive Of Cancer Camouflage Histology Text: There was a dense lymphocytic infiltrate which prevented adequate histologic evaluation of adjacent structures.
Bcc Histology Text: There were numerous aggregates of basaloid cells.
Bcc Infiltrative Histology Text: There were numerous aggregates of basaloid cells demonstrating an infiltrative pattern.
Mart-1 - Positive Histology Text: MART-1 staining demonstrates areas of higher density and clustering of melanocytes with Pagetoid spread upwards within the epidermis. The surgical margins are positive for tumor cells.
Mart-1 - Negative Histology Text: MART-1 staining demonstrates a normal density and pattern of melanocytes along the dermal-epidermal junction. The surgical margins are negative for tumor cells.
Information: Selecting Yes will display possible errors in your note based on the variables you have selected. This validation is only offered as a suggestion for you. PLEASE NOTE THAT THE VALIDATION TEXT WILL BE REMOVED WHEN YOU FINALIZE YOUR NOTE. IF YOU WANT TO FAX A PRELIMINARY NOTE YOU WILL NEED TO TOGGLE THIS TO 'NO' IF YOU DO NOT WANT IT IN YOUR FAXED NOTE.
Bill 59 Modifier?: No - Continue to Bill 79 Modifier

## 2025-04-21 NOTE — TELEPHONE ENCOUNTER
Pt is calling regarding his doxazosin 4 mg medication. Pt states he takes this twice a day. Pt states the pharmacy is giving him Cardura and that is too expensive for the patient. He is requesting the doxazosin to be called in rather than the Cardura.

## 2025-04-21 NOTE — TELEPHONE ENCOUNTER
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    Procedure(s) (LRB):  ARTHROSCOPY, KNEE (Right)  ARTHROSCOPY, KNEE, WITH DEBRIDEMENT (Right)  ARTHROSCOPY, KNEE, WITH CHONDROPLASTY MEDIAL CONDYLE (Right)  ARTHROSCOPY, KNEE, WITH PARTIAL MEDIAL MENISCECTOMY (Right)    Anesthesia: General    Description of the findings of the procedure(s): Synovitis diffuse and medial plica ( soft) -Chondromalacia grade # of the patella with flap ; Chondromalacia medial compartment grade 3 Condyle and tibial plateau > posterior 1/3; ACL avulsion from tibial insertion     Estimated Blood Loss: 10 cc         Specimens: none  Specimen (12h ago, onward)    None            Discharge Note    OUTCOME: Patient tolerated treatment/procedure well without complication and is now ready for discharge.    DISPOSITION: home    FINAL DIAGNOSIS:  <principal problem not specified>    FOLLOWUP: in one week in Dr Matos office    DISCHARGE INSTRUCTIONS:    Discharge Procedure Orders   Diet diabetic   Order Comments: 1800 risa     Notify your health care provider if you experience any of the following:  redness, tenderness, or signs of infection (pain, swelling, redness, odor or green/yellow discharge around incision site)     Leave dressing on - Keep it clean, dry, and intact until clinic visit     Activity as tolerated      Requested Prescriptions     Pending Prescriptions Disp Refills    doxazosin (CARDURA XL) 4 MG extended release tablet 180 tablet 3     Sig: Take 1 tablet by mouth in the morning and at bedtime         Verified rx. Last OV 12/16/24. Erx to pharm on file.

## 2025-04-22 RX ORDER — DOXAZOSIN MESYLATE 4 MG/1
4 TABLET, FILM COATED, EXTENDED RELEASE ORAL 2 TIMES DAILY
Qty: 180 TABLET | Refills: 3 | Status: CANCELLED | OUTPATIENT
Start: 2025-04-22

## 2025-04-24 RX ORDER — DOXAZOSIN 4 MG/1
4 TABLET ORAL 2 TIMES DAILY
Qty: 180 TABLET | Refills: 3 | Status: SHIPPED | OUTPATIENT
Start: 2025-04-24

## 2025-04-24 RX ORDER — DOXAZOSIN 4 MG/1
4 TABLET ORAL 2 TIMES DAILY
COMMUNITY
End: 2025-04-24 | Stop reason: SDUPTHER

## 2025-05-11 DIAGNOSIS — F32.1 MODERATE SINGLE CURRENT EPISODE OF MAJOR DEPRESSIVE DISORDER (HCC): ICD-10-CM

## 2025-05-12 RX ORDER — BUPROPION HYDROCHLORIDE 150 MG/1
150 TABLET ORAL EVERY MORNING
Qty: 30 TABLET | Refills: 3 | Status: SHIPPED | OUTPATIENT
Start: 2025-05-12

## 2025-05-14 DIAGNOSIS — K21.9 GASTROESOPHAGEAL REFLUX DISEASE WITHOUT ESOPHAGITIS: ICD-10-CM

## 2025-05-14 RX ORDER — RABEPRAZOLE SODIUM 20 MG/1
20 TABLET, DELAYED RELEASE ORAL DAILY
Qty: 90 TABLET | Refills: 0 | Status: SHIPPED | OUTPATIENT
Start: 2025-05-14

## 2025-05-27 ENCOUNTER — OFFICE VISIT (OUTPATIENT)
Dept: FAMILY MEDICINE CLINIC | Facility: CLINIC | Age: 60
End: 2025-05-27
Payer: COMMERCIAL

## 2025-05-27 VITALS
HEIGHT: 75 IN | HEART RATE: 61 BPM | BODY MASS INDEX: 31.58 KG/M2 | WEIGHT: 254 LBS | RESPIRATION RATE: 16 BRPM | OXYGEN SATURATION: 97 % | DIASTOLIC BLOOD PRESSURE: 80 MMHG | TEMPERATURE: 97.9 F | SYSTOLIC BLOOD PRESSURE: 144 MMHG

## 2025-05-27 DIAGNOSIS — N18.31 CHRONIC RENAL FAILURE, STAGE 3A (HCC): ICD-10-CM

## 2025-05-27 DIAGNOSIS — I10 ESSENTIAL HYPERTENSION: ICD-10-CM

## 2025-05-27 DIAGNOSIS — Z00.00 ROUTINE GENERAL MEDICAL EXAMINATION AT A HEALTH CARE FACILITY: Primary | ICD-10-CM

## 2025-05-27 DIAGNOSIS — Z12.11 SCREENING FOR MALIGNANT NEOPLASM OF COLON: ICD-10-CM

## 2025-05-27 DIAGNOSIS — F32.1 MODERATE SINGLE CURRENT EPISODE OF MAJOR DEPRESSIVE DISORDER (HCC): ICD-10-CM

## 2025-05-27 DIAGNOSIS — E11.9 TYPE 2 DIABETES MELLITUS WITHOUT COMPLICATION, WITHOUT LONG-TERM CURRENT USE OF INSULIN (HCC): ICD-10-CM

## 2025-05-27 DIAGNOSIS — K21.9 GASTROESOPHAGEAL REFLUX DISEASE WITHOUT ESOPHAGITIS: ICD-10-CM

## 2025-05-27 PROCEDURE — 3077F SYST BP >= 140 MM HG: CPT | Performed by: FAMILY MEDICINE

## 2025-05-27 PROCEDURE — 99396 PREV VISIT EST AGE 40-64: CPT | Performed by: FAMILY MEDICINE

## 2025-05-27 PROCEDURE — 99214 OFFICE O/P EST MOD 30 MIN: CPT | Performed by: FAMILY MEDICINE

## 2025-05-27 PROCEDURE — 3079F DIAST BP 80-89 MM HG: CPT | Performed by: FAMILY MEDICINE

## 2025-05-27 RX ORDER — RABEPRAZOLE SODIUM 20 MG/1
20 TABLET, DELAYED RELEASE ORAL DAILY
Qty: 90 TABLET | Refills: 3 | Status: SHIPPED | OUTPATIENT
Start: 2025-05-27

## 2025-05-27 RX ORDER — BUPROPION HYDROCHLORIDE 150 MG/1
150 TABLET ORAL EVERY MORNING
Qty: 90 TABLET | Refills: 3 | Status: SHIPPED | OUTPATIENT
Start: 2025-05-27

## 2025-05-27 SDOH — ECONOMIC STABILITY: FOOD INSECURITY: WITHIN THE PAST 12 MONTHS, THE FOOD YOU BOUGHT JUST DIDN'T LAST AND YOU DIDN'T HAVE MONEY TO GET MORE.: NEVER TRUE

## 2025-05-27 SDOH — ECONOMIC STABILITY: FOOD INSECURITY: WITHIN THE PAST 12 MONTHS, YOU WORRIED THAT YOUR FOOD WOULD RUN OUT BEFORE YOU GOT MONEY TO BUY MORE.: NEVER TRUE

## 2025-05-27 NOTE — PROGRESS NOTES
[facility] [place] [place] [address]  Phone: [phone] Fax [phone]  Tash Villa MD             Mr. Tyler is a 59 y.o.  y.o.  year old  male patient who comes in for complete physical.    Last colonoscopy was 3 years ago.  History of Present Illness  The patient presents for a physical exam.  Patient has recently been diagnosed with diabetes.  He also has had resistant hypertension and sees cardiology regularly for that.  He also has sleep apnea and needs to go back to a sleep doctor because he has an error message on his CPAP machine.  He also has reflux and takes Aciphex.  He also has depression and takes Wellbutrin.  It is well-controlled on that.    Blood work performed on 05/16/2025 revealed elevated creatinine levels and potential kidney function issues. He is seeking a referral to Dr. Sylvester Yeh, a nephrologist at [facility], for further evaluation. He has been taking Aciphex and is considering discontinuing it temporarily to assess its impact on kidney function. Additionally, he is contemplating a dietary change under the guidance of a physician in [place].    Blood pressure typically ranges from 140s to 150s systolic and over 90 diastolic. Despite medication adjustments by Dr. Schaefer, blood pressure remains unresponsive to treatment. He was informed about a new procedure involving nerve freezing in the kidney to regulate blood pressure but missed the opportunity to participate in the trial. He expresses concern about the potential damage uncontrolled blood pressure could cause to his organs. Current medications for blood pressure management include bumetanide, labetalol 200 mg twice daily, Cardura 4 mg twice daily, and spironolactone twice daily. Significant fluid retention persists despite bumetanide use. Compression socks are worn for fluid retention, which is attributed to missing lymph nodes in his groin. Swelling occurs daily around 3 or 4

## 2025-05-30 LAB
CHOLEST SERPL-MCNC: 217 MG/DL (ref 100–199)
HBA1C MFR BLD: 6.2 % (ref 4.8–5.6)
HDLC SERPL-MCNC: 43 MG/DL
LDLC SERPL CALC-MCNC: 150 MG/DL (ref 0–99)
TRIGL SERPL-MCNC: 134 MG/DL (ref 0–149)
VLDLC SERPL CALC-MCNC: 24 MG/DL (ref 5–40)

## 2025-05-31 LAB
ALBUMIN/CREAT UR: <2 MG/G CREAT (ref 0–29)
CREAT UR-MCNC: 190.1 MG/DL
IMP & REVIEW OF LAB RESULTS: NORMAL
MICROALBUMIN UR-MCNC: <3 UG/ML

## 2025-06-02 ENCOUNTER — PATIENT MESSAGE (OUTPATIENT)
Dept: FAMILY MEDICINE CLINIC | Facility: CLINIC | Age: 60
End: 2025-06-02

## 2025-06-02 ENCOUNTER — RESULTS FOLLOW-UP (OUTPATIENT)
Dept: FAMILY MEDICINE CLINIC | Facility: CLINIC | Age: 60
End: 2025-06-02

## 2025-06-02 NOTE — RESULT ENCOUNTER NOTE
Let patient know labs show a good A1c of 6.2.  Cholesterol is slightly up at 217 so work on his diet in that regard.  Urine protein is normal.

## 2025-06-03 ENCOUNTER — PATIENT MESSAGE (OUTPATIENT)
Dept: SLEEP MEDICINE | Age: 60
End: 2025-06-03

## 2025-06-09 ENCOUNTER — PATIENT MESSAGE (OUTPATIENT)
Dept: SLEEP MEDICINE | Age: 60
End: 2025-06-09

## 2025-06-09 ENCOUNTER — PATIENT MESSAGE (OUTPATIENT)
Dept: FAMILY MEDICINE CLINIC | Facility: CLINIC | Age: 60
End: 2025-06-09

## 2025-06-09 DIAGNOSIS — N18.31 CHRONIC RENAL FAILURE, STAGE 3A (HCC): Primary | ICD-10-CM

## 2025-06-09 DIAGNOSIS — H81.03 MENIERE'S DISEASE OF BOTH EARS: ICD-10-CM

## 2025-06-09 RX ORDER — ONDANSETRON 4 MG/1
4 TABLET, ORALLY DISINTEGRATING ORAL 3 TIMES DAILY PRN
Qty: 21 TABLET | Refills: 5 | Status: SHIPPED | OUTPATIENT
Start: 2025-06-09

## 2025-06-09 RX ORDER — TRAZODONE HYDROCHLORIDE 50 MG/1
50 TABLET ORAL NIGHTLY PRN
Qty: 30 TABLET | Refills: 5 | Status: SHIPPED | OUTPATIENT
Start: 2025-06-09

## 2025-06-09 NOTE — TELEPHONE ENCOUNTER
Patient was last seen on 02/10/2025 with MAITE Noriega. He is asking for refills on his trazodone.

## 2025-08-14 ENCOUNTER — PATIENT MESSAGE (OUTPATIENT)
Dept: FAMILY MEDICINE CLINIC | Facility: CLINIC | Age: 60
End: 2025-08-14

## 2025-08-14 ENCOUNTER — TELEPHONE (OUTPATIENT)
Dept: FAMILY MEDICINE CLINIC | Facility: CLINIC | Age: 60
End: 2025-08-14

## 2025-08-20 ENCOUNTER — OFFICE VISIT (OUTPATIENT)
Dept: FAMILY MEDICINE CLINIC | Facility: CLINIC | Age: 60
End: 2025-08-20
Payer: COMMERCIAL

## 2025-08-20 VITALS
DIASTOLIC BLOOD PRESSURE: 75 MMHG | TEMPERATURE: 97.6 F | OXYGEN SATURATION: 96 % | HEIGHT: 75 IN | RESPIRATION RATE: 16 BRPM | BODY MASS INDEX: 30.72 KG/M2 | WEIGHT: 247.1 LBS | HEART RATE: 70 BPM | SYSTOLIC BLOOD PRESSURE: 121 MMHG

## 2025-08-20 DIAGNOSIS — R42 EPISODIC LIGHTHEADEDNESS: Primary | ICD-10-CM

## 2025-08-20 DIAGNOSIS — F51.01 PRIMARY INSOMNIA: ICD-10-CM

## 2025-08-20 DIAGNOSIS — I10 ESSENTIAL HYPERTENSION: ICD-10-CM

## 2025-08-20 PROCEDURE — 3074F SYST BP LT 130 MM HG: CPT | Performed by: PHYSICIAN ASSISTANT

## 2025-08-20 PROCEDURE — 93000 ELECTROCARDIOGRAM COMPLETE: CPT | Performed by: PHYSICIAN ASSISTANT

## 2025-08-20 PROCEDURE — 99214 OFFICE O/P EST MOD 30 MIN: CPT | Performed by: PHYSICIAN ASSISTANT

## 2025-08-20 PROCEDURE — 3078F DIAST BP <80 MM HG: CPT | Performed by: PHYSICIAN ASSISTANT

## 2025-08-20 RX ORDER — LISINOPRIL 20 MG/1
20 TABLET ORAL DAILY
COMMUNITY

## 2025-08-20 ASSESSMENT — PATIENT HEALTH QUESTIONNAIRE - PHQ9
1. LITTLE INTEREST OR PLEASURE IN DOING THINGS: NOT AT ALL
SUM OF ALL RESPONSES TO PHQ QUESTIONS 1-9: 6
3. TROUBLE FALLING OR STAYING ASLEEP: NEARLY EVERY DAY
9. THOUGHTS THAT YOU WOULD BE BETTER OFF DEAD, OR OF HURTING YOURSELF: NOT AT ALL
8. MOVING OR SPEAKING SO SLOWLY THAT OTHER PEOPLE COULD HAVE NOTICED. OR THE OPPOSITE, BEING SO FIGETY OR RESTLESS THAT YOU HAVE BEEN MOVING AROUND A LOT MORE THAN USUAL: NOT AT ALL
4. FEELING TIRED OR HAVING LITTLE ENERGY: NEARLY EVERY DAY
6. FEELING BAD ABOUT YOURSELF - OR THAT YOU ARE A FAILURE OR HAVE LET YOURSELF OR YOUR FAMILY DOWN: NOT AT ALL
7. TROUBLE CONCENTRATING ON THINGS, SUCH AS READING THE NEWSPAPER OR WATCHING TELEVISION: NOT AT ALL
10. IF YOU CHECKED OFF ANY PROBLEMS, HOW DIFFICULT HAVE THESE PROBLEMS MADE IT FOR YOU TO DO YOUR WORK, TAKE CARE OF THINGS AT HOME, OR GET ALONG WITH OTHER PEOPLE: SOMEWHAT DIFFICULT
2. FEELING DOWN, DEPRESSED OR HOPELESS: NOT AT ALL
5. POOR APPETITE OR OVEREATING: NOT AT ALL
SUM OF ALL RESPONSES TO PHQ QUESTIONS 1-9: 6

## 2025-08-20 ASSESSMENT — ENCOUNTER SYMPTOMS
WHEEZING: 0
CHEST TIGHTNESS: 0
SHORTNESS OF BREATH: 1

## 2025-09-05 ENCOUNTER — PATIENT MESSAGE (OUTPATIENT)
Dept: FAMILY MEDICINE CLINIC | Facility: CLINIC | Age: 60
End: 2025-09-05

## (undated) DEVICE — T4 HOOD

## (undated) DEVICE — TRAY CATH 16F DRN BG LTX -- CONVERT TO ITEM 363158

## (undated) DEVICE — SUTURE MCRYL SZ 2-0 L27IN ABSRB UD CP-1 1 L36MM 1/2 CIR REV Y266H

## (undated) DEVICE — STERILE PRESSURE PROTECTOR PAD® FOR DE MAYO UNIVERSAL DISTRACTOR® (10/CASE): Brand: DE MAYO UNIVERSAL DISTRACTOR®

## (undated) DEVICE — NEEDLE HYPO 21GA L1.5IN INTRAMUSCULAR S STL LATCH BVL UP

## (undated) DEVICE — 3000CC GUARDIAN II: Brand: GUARDIAN

## (undated) DEVICE — BUTTON SWITCH PENCIL BLADE ELECTRODE, HOLSTER: Brand: EDGE

## (undated) DEVICE — TRAY PREP DRY W/ PREM GLV 2 APPL 6 SPNG 2 UNDPD 1 OVERWRAP

## (undated) DEVICE — 2000CC GUARDIAN II: Brand: GUARDIAN

## (undated) DEVICE — REM POLYHESIVE ADULT PATIENT RETURN ELECTRODE: Brand: VALLEYLAB

## (undated) DEVICE — PACK PROCEDURE SURG TOT KNEE

## (undated) DEVICE — SYR LR LCK 1ML GRAD NSAF 30ML --

## (undated) DEVICE — SUTURE FIBERWIRE SZ 2 W/ TAPERED NEEDLE BLUE L38IN NONABSORB BLU L26.5MM 1/2 CIRCLE AR7200

## (undated) DEVICE — SUTURE VCRL SZ 2-0 L27IN ABSRB UD L36MM CP-1 1/2 CIR REV J266H

## (undated) DEVICE — DRAPE,TOP,102X53,STERILE: Brand: MEDLINE

## (undated) DEVICE — NEEDLE HYPO 18GA L1.5IN PNK S STL HUB POLYPR SHLD REG BVL

## (undated) DEVICE — CONTAINER,SPECIMEN,O.R.STRL,4.5OZ: Brand: MEDLINE

## (undated) DEVICE — SUTURE MCRYL SZ 3-0 L27IN ABSRB UD L19MM PS-2 3/8 CIR PRIM Y427H

## (undated) DEVICE — MEDI-VAC YANKAUER SUCTION HANDLE W/BULBOUS TIP: Brand: CARDINAL HEALTH

## (undated) DEVICE — SYR 10ML LUER LOK 1/5ML GRAD --

## (undated) DEVICE — SUTURE STRATAFIX SPRL SZ 1 L5IN ABSRB VLT CT-1 L36MM 1/2 SXPD2B401

## (undated) DEVICE — FAN SPRAY KIT: Brand: PULSAVAC®

## (undated) DEVICE — SOLUTION IRRIG 3000ML 0.9% SOD CHL FLX CONT 0797208] ICU MEDICAL INC]

## (undated) DEVICE — SOLUTION IV 1000ML 0.9% SOD CHL

## (undated) DEVICE — (D)SYR 10ML 1/5ML GRAD NSAF -- PKGING CHANGE USE ITEM 338027

## (undated) DEVICE — (D)PREP SKN CHLRAPRP APPL 26ML -- CONVERT TO ITEM 371833

## (undated) DEVICE — NEEDLE SPNL 22GA L3.5IN BLK HUB S STL REG WALL FIT STYL W/

## (undated) DEVICE — Z DISCONTINUED USE 2744636  DRESSING AQUACEL 14 IN ALG W3.5XL14IN POLYUR FLM CVR W/ HYDRCOLL